# Patient Record
Sex: FEMALE | Race: WHITE | Employment: OTHER | URBAN - METROPOLITAN AREA
[De-identification: names, ages, dates, MRNs, and addresses within clinical notes are randomized per-mention and may not be internally consistent; named-entity substitution may affect disease eponyms.]

---

## 2017-02-20 RX ORDER — FLUOXETINE 10 MG/1
10 CAPSULE ORAL DAILY
Qty: 30 CAP | Refills: 1 | Status: SHIPPED | OUTPATIENT
Start: 2017-02-20 | End: 2017-05-15 | Stop reason: SDUPTHER

## 2017-03-01 ENCOUNTER — OFFICE VISIT (OUTPATIENT)
Dept: INTERNAL MEDICINE CLINIC | Age: 62
End: 2017-03-01

## 2017-03-01 VITALS
WEIGHT: 142.2 LBS | RESPIRATION RATE: 16 BRPM | DIASTOLIC BLOOD PRESSURE: 53 MMHG | OXYGEN SATURATION: 97 % | SYSTOLIC BLOOD PRESSURE: 105 MMHG | HEART RATE: 64 BPM | HEIGHT: 63 IN | BODY MASS INDEX: 25.2 KG/M2 | TEMPERATURE: 98.7 F

## 2017-03-01 DIAGNOSIS — E78.5 HYPERLIPIDEMIA LDL GOAL <130: ICD-10-CM

## 2017-03-01 DIAGNOSIS — K51.918 ULCERATIVE COLITIS WITH OTHER COMPLICATION: Chronic | ICD-10-CM

## 2017-03-01 DIAGNOSIS — F34.1 DYSTHYMIA: ICD-10-CM

## 2017-03-01 DIAGNOSIS — E03.4 HYPOTHYROIDISM DUE TO ACQUIRED ATROPHY OF THYROID: ICD-10-CM

## 2017-03-01 DIAGNOSIS — Z00.00 ROUTINE PHYSICAL EXAMINATION: Primary | ICD-10-CM

## 2017-03-01 NOTE — PROGRESS NOTES
HISTORY OF PRESENT ILLNESS  Emilia Gutierrez is a 59 y.o. female. HPI  SUBJECTIVE: Emilia Gutierrez is a 59 y.o. female here for follow up of hypothyroidism. Lab Results   Component Value Date/Time    TSH 0.511 10/26/2016 10:30 AM     Thyroid ROS: denies fatigue, weight changes, heat/cold intolerance, bowel/skin changes or CVS symptoms. Subjective:   Emilia Gutierrez is a 59 y.o. female with hyperlipidemia. Cardiovascular risk analysis - 59 y.o. female LDL goal is under 130. ROS: taking medications as instructed, no medication side effects noted, no TIA's, no chest pain on exertion, no dyspnea on exertion, no swelling of ankles. Tolerating meds, no myalgias or other side effects noted  New concerns: stable. Dysthymia- cont with prozac stable     UC- stable no current issues with her bowels    Review of Systems   Constitutional: Negative for chills, diaphoresis, fever, malaise/fatigue and weight loss. HENT: Negative for congestion, ear pain, hearing loss, sore throat and tinnitus. Eyes: Negative for blurred vision and double vision. Respiratory: Negative for cough, hemoptysis, sputum production, shortness of breath and wheezing. Cardiovascular: Negative for chest pain, palpitations, orthopnea, claudication, leg swelling and PND. Gastrointestinal: Negative for abdominal pain, blood in stool, constipation, diarrhea, heartburn, nausea and vomiting. Genitourinary: Negative for dysuria, flank pain, frequency, hematuria and urgency. Musculoskeletal: Negative for back pain, joint pain, myalgias and neck pain. Skin: Negative. Neurological: Negative for dizziness, tingling, sensory change, speech change, focal weakness, seizures, loss of consciousness, weakness and headaches. Endo/Heme/Allergies: Negative for environmental allergies and polydipsia. Does not bruise/bleed easily. Psychiatric/Behavioral: Negative for depression, memory loss, substance abuse and suicidal ideas.  The patient is not nervous/anxious and does not have insomnia. Physical Exam   Constitutional: She is oriented to person, place, and time. She appears well-developed and well-nourished. HENT:   Head: Normocephalic and atraumatic. Right Ear: External ear normal.   Left Ear: External ear normal.   Nose: Nose normal.   Mouth/Throat: Oropharynx is clear and moist.   Eyes: Conjunctivae and EOM are normal. Pupils are equal, round, and reactive to light. Neck: Normal range of motion. Neck supple. Carotid bruit is not present. No thyromegaly present. Cardiovascular: Normal rate, regular rhythm, S1 normal, S2 normal, normal heart sounds and intact distal pulses. Pulmonary/Chest: Effort normal and breath sounds normal.   Abdominal: Soft. Normal appearance and bowel sounds are normal. There is no hepatosplenomegaly. There is no tenderness. Musculoskeletal: Normal range of motion. Neurological: She is alert and oriented to person, place, and time. Skin: Skin is warm, dry and intact. Psychiatric: She has a normal mood and affect. Her behavior is normal. Judgment and thought content normal.   Nursing note and vitals reviewed.       ASSESSMENT and Jovany Malone was seen today for annual exam.    Diagnoses and all orders for this visit:    Ulcerative colitis with other complication ([facility] [address]  -     CBC W/O DIFF    Hypothyroidism due to acquired atrophy of thyroid  -     TSH 3RD GENERATION  -     T4, FREE    Hyperlipidemia LDL goal <004  -     METABOLIC PANEL, COMPREHENSIVE  -     LIPID PANEL    Dysthymia-cont current dose of medicine       lab results and schedule of future lab studies reviewed with patient  reviewed diet, exercise and weight control  cardiovascular risk and specific lipid/LDL goals reviewed  reviewed medications and side effects in detail

## 2017-03-02 LAB
ALBUMIN SERPL-MCNC: 4.7 G/DL (ref 3.6–4.8)
ALBUMIN/GLOB SERPL: 2.6 {RATIO} (ref 1.1–2.5)
ALP SERPL-CCNC: 73 IU/L (ref 39–117)
ALT SERPL-CCNC: 23 IU/L (ref 0–32)
AST SERPL-CCNC: 27 IU/L (ref 0–40)
BILIRUB SERPL-MCNC: 1 MG/DL (ref 0–1.2)
BUN SERPL-MCNC: 18 MG/DL (ref 8–27)
BUN/CREAT SERPL: 30 (ref 11–26)
CALCIUM SERPL-MCNC: 9.2 MG/DL (ref 8.7–10.3)
CHLORIDE SERPL-SCNC: 100 MMOL/L (ref 96–106)
CHOLEST SERPL-MCNC: 159 MG/DL (ref 100–199)
CO2 SERPL-SCNC: 28 MMOL/L (ref 18–29)
CREAT SERPL-MCNC: 0.6 MG/DL (ref 0.57–1)
ERYTHROCYTE [DISTWIDTH] IN BLOOD BY AUTOMATED COUNT: 13.9 % (ref 12.3–15.4)
GLOBULIN SER CALC-MCNC: 1.8 G/DL (ref 1.5–4.5)
GLUCOSE SERPL-MCNC: 72 MG/DL (ref 65–99)
HCT VFR BLD AUTO: 40.9 % (ref 34–46.6)
HDLC SERPL-MCNC: 73 MG/DL
HGB BLD-MCNC: 13.7 G/DL (ref 11.1–15.9)
INTERPRETATION, 910389: NORMAL
LDLC SERPL CALC-MCNC: 73 MG/DL (ref 0–99)
MCH RBC QN AUTO: 30.2 PG (ref 26.6–33)
MCHC RBC AUTO-ENTMCNC: 33.5 G/DL (ref 31.5–35.7)
MCV RBC AUTO: 90 FL (ref 79–97)
PLATELET # BLD AUTO: 214 X10E3/UL (ref 150–379)
POTASSIUM SERPL-SCNC: 4.3 MMOL/L (ref 3.5–5.2)
PROT SERPL-MCNC: 6.5 G/DL (ref 6–8.5)
RBC # BLD AUTO: 4.54 X10E6/UL (ref 3.77–5.28)
SODIUM SERPL-SCNC: 143 MMOL/L (ref 134–144)
T4 FREE SERPL-MCNC: 1.64 NG/DL (ref 0.82–1.77)
TRIGL SERPL-MCNC: 65 MG/DL (ref 0–149)
TSH SERPL DL<=0.005 MIU/L-ACNC: 0.51 UIU/ML (ref 0.45–4.5)
VLDLC SERPL CALC-MCNC: 13 MG/DL (ref 5–40)
WBC # BLD AUTO: 5.7 X10E3/UL (ref 3.4–10.8)

## 2017-05-15 RX ORDER — FLUOXETINE 10 MG/1
10 CAPSULE ORAL DAILY
Qty: 30 CAP | Refills: 3 | Status: SHIPPED | OUTPATIENT
Start: 2017-05-15 | End: 2017-08-30 | Stop reason: SDUPTHER

## 2017-05-17 RX ORDER — CALCITONIN SALMON 200 [IU]/.09ML
SPRAY, METERED NASAL
Qty: 11.1 ML | Refills: 2 | Status: SHIPPED | OUTPATIENT
Start: 2017-05-17 | End: 2018-03-15 | Stop reason: SDUPTHER

## 2017-06-13 ENCOUNTER — OFFICE VISIT (OUTPATIENT)
Dept: INTERNAL MEDICINE CLINIC | Age: 62
End: 2017-06-13

## 2017-06-13 VITALS
WEIGHT: 147 LBS | RESPIRATION RATE: 14 BRPM | DIASTOLIC BLOOD PRESSURE: 50 MMHG | HEART RATE: 66 BPM | OXYGEN SATURATION: 99 % | HEIGHT: 63 IN | SYSTOLIC BLOOD PRESSURE: 114 MMHG | TEMPERATURE: 98.5 F | BODY MASS INDEX: 26.05 KG/M2

## 2017-06-13 DIAGNOSIS — N89.8 VAGINAL ITCHING: Primary | ICD-10-CM

## 2017-06-13 DIAGNOSIS — F34.1 DYSTHYMIA: ICD-10-CM

## 2017-06-13 DIAGNOSIS — E78.5 HYPERLIPIDEMIA LDL GOAL <130: ICD-10-CM

## 2017-06-13 DIAGNOSIS — E03.4 HYPOTHYROIDISM DUE TO ACQUIRED ATROPHY OF THYROID: ICD-10-CM

## 2017-06-13 RX ORDER — ESTRADIOL 0.1 MG/G
2 CREAM VAGINAL DAILY
Qty: 42.5 G | Refills: 1 | Status: SHIPPED | OUTPATIENT
Start: 2017-06-13 | End: 2017-09-06

## 2017-06-13 NOTE — PROGRESS NOTES
HISTORY OF PRESENT ILLNESS  Denise Guo is a 59 y.o. female. HPI  Patient presents today for vaginal itching. Patient reports that her vaginal itching started a week ago. She states that it burns and itches. She reports that she has a little discharge. She states that she has burning and tingling when sitting. Patient reports that she used a Hydrocortisone medication on the outside of vagina. She has a history of yeast infections and does not think it is a yeast infection. Patient states that her mood is doing well and stable. Hypothyroidism. Lab Results   Component Value Date/Time    TSH 0.511 03/01/2017 10:12 AM     Thyroid ROS: denies fatigue, weight changes, heat/cold intolerance, bowel/skin changes or CVS symptoms. Patient reports that she is doing well on her thyroid medication. Hyperlipidemia:  Cardiovascular risk analysis - 59 y.o. female LDL goal is under 130. ROS: taking medications as instructed, no medication side effects noted, no TIA's, no chest pain on exertion, no dyspnea on exertion, no swelling of ankles. Tolerating meds, no myalgias or other side effects noted  New concerns: LDL was 73 on 3/1/2017. Review of Systems   All other systems reviewed and are negative. Physical Exam   Constitutional: She is oriented to person, place, and time. She appears well-developed and well-nourished. HENT:   Head: Normocephalic and atraumatic. Right Ear: External ear normal.   Left Ear: External ear normal.   Nose: Nose normal.   Mouth/Throat: Oropharynx is clear and moist.   Eyes: Conjunctivae and EOM are normal.   Neck: Normal range of motion. Neck supple. Carotid bruit is not present. No thyroid mass and no thyromegaly present. Cardiovascular: Normal rate, regular rhythm, S1 normal, S2 normal, normal heart sounds and intact distal pulses. Pulmonary/Chest: Effort normal and breath sounds normal.   Abdominal: Soft.  Normal appearance and bowel sounds are normal. There is no hepatosplenomegaly. There is no tenderness. Genitourinary:   Genitourinary Comments: Atrophic vaginal wall. No discharge. Musculoskeletal: Normal range of motion. Neurological: She is alert and oriented to person, place, and time. She has normal strength. No cranial nerve deficit or sensory deficit. Coordination normal.   Skin: Skin is warm, dry and intact. No abrasion and no rash noted. Psychiatric: She has a normal mood and affect. Her behavior is normal. Judgment and thought content normal.   Nursing note and vitals reviewed. ASSESSMENT and PLAN  Shobha Smith was seen today for vaginal itching. Diagnoses and all orders for this visit:    Vaginal itching  Not yeast infection. I think this is due to vaginal dryness with the burning and dryness in vaginal folds will try estrogen cream and see if that helps    Hypothyroidism due to acquired atrophy of thyroid  Continue on current meds. Hyperlipidemia LDL goal <130  Follow up in Fall. LDL was 73 on 3/1/2017    Dysthymia  Mood is stable. Cont current dose of medicine    Other orders  -     estradiol (ESTRACE) 0.01 % (0.1 mg/gram) vaginal cream; Insert 2 g into vagina daily. Daily for two weeks then 2-3 times a week    lab results and schedule of future lab studies reviewed with patient  reviewed diet, exercise and weight control    Written by Alejandro Tubbs, as dictated by Uriel Viera MD.     Current diagnosis and concerns discussed with pt at length. Understands risks and benefits or current treatment plan and medications and accepts the treatment and medication with any possible risks. Pt asks appropriate questions which were answered. Pt instructed to call with any concerns or problems.

## 2017-08-05 RX ORDER — ESTRADIOL 10 UG/1
10 INSERT VAGINAL DAILY
Qty: 30 TAB | Refills: 3 | Status: SHIPPED | OUTPATIENT
Start: 2017-08-05 | End: 2018-06-05 | Stop reason: ALTCHOICE

## 2017-08-30 RX ORDER — FLUOXETINE 10 MG/1
10 CAPSULE ORAL DAILY
Qty: 30 CAP | Refills: 5 | Status: SHIPPED | OUTPATIENT
Start: 2017-08-30 | End: 2018-02-11 | Stop reason: SDUPTHER

## 2017-09-06 ENCOUNTER — OFFICE VISIT (OUTPATIENT)
Dept: INTERNAL MEDICINE CLINIC | Age: 62
End: 2017-09-06

## 2017-09-06 VITALS
HEIGHT: 63 IN | DIASTOLIC BLOOD PRESSURE: 59 MMHG | SYSTOLIC BLOOD PRESSURE: 140 MMHG | BODY MASS INDEX: 25.41 KG/M2 | OXYGEN SATURATION: 98 % | WEIGHT: 143.4 LBS | RESPIRATION RATE: 18 BRPM | HEART RATE: 68 BPM | TEMPERATURE: 98.5 F

## 2017-09-06 DIAGNOSIS — E78.5 HYPERLIPIDEMIA LDL GOAL <130: ICD-10-CM

## 2017-09-06 DIAGNOSIS — F34.1 DYSTHYMIA: ICD-10-CM

## 2017-09-06 DIAGNOSIS — E03.4 HYPOTHYROIDISM DUE TO ACQUIRED ATROPHY OF THYROID: Primary | ICD-10-CM

## 2017-09-06 DIAGNOSIS — K51.918 ULCERATIVE COLITIS WITH OTHER COMPLICATION: Chronic | ICD-10-CM

## 2017-09-06 RX ORDER — LEVOTHYROXINE SODIUM 75 UG/1
75 TABLET ORAL
Qty: 90 TAB | Refills: 3 | Status: SHIPPED | COMMUNITY
Start: 2017-09-06 | End: 2018-11-06 | Stop reason: SDUPTHER

## 2017-09-06 NOTE — PROGRESS NOTES
Chief Complaint   Patient presents with    Cholesterol Problem     6 month f/u w/ labs     1. Have you been to the ER, urgent care clinic since your last visit? Hospitalized since your last visit? No    2. Have you seen or consulted any other health care providers outside of the [address] [place] [address] since your last visit? Include any pap smears or colon screening.  No

## 2017-09-06 NOTE — PROGRESS NOTES
HISTORY OF PRESENT ILLNESS  Anjali Taylor is a 59 y.o. female. HPI   Presents today for follow up. Patient reports she is exercising regularly. She states mood is doing great on Prozac. Mood is stable and she has good balance despite all the pull she has with her mom and trying to manage her health  Patient reports she is managing ulcerative colitis. Hypothyroidism. Lab Results   Component Value Date/Time    TSH 0.511 03/01/2017 10:12 AM     Thyroid ROS: denies fatigue, weight changes, heat/cold intolerance, bowel/skin changes or CVS symptoms. Hyperlipidemia:  Cardiovascular risk analysis - 59 y.o. female LDL goal is under 130. ROS: taking medications as instructed, no medication side effects noted, no TIA's, no chest pain on exertion, no dyspnea on exertion, no swelling of ankles. Tolerating meds, no myalgias or other side effects noted  New concerns: Last LDL was 73 on 3/1/2017. Review of Systems   All other systems reviewed and are negative. Physical Exam   Constitutional: She is oriented to person, place, and time. She appears well-developed and well-nourished. HENT:   Head: Normocephalic and atraumatic. Right Ear: External ear normal.   Left Ear: External ear normal.   Nose: Nose normal.   Mouth/Throat: Oropharynx is clear and moist.   Eyes: Conjunctivae and EOM are normal.   Neck: Normal range of motion. Neck supple. Carotid bruit is not present. No thyroid mass and no thyromegaly present. Cardiovascular: Normal rate, regular rhythm, S1 normal, S2 normal, normal heart sounds and intact distal pulses. Pulmonary/Chest: Effort normal and breath sounds normal.   Abdominal: Soft. Normal appearance and bowel sounds are normal. There is no hepatosplenomegaly. There is no tenderness. Musculoskeletal: Normal range of motion. Neurological: She is alert and oriented to person, place, and time. She has normal strength. No cranial nerve deficit or sensory deficit.  Coordination normal. Skin: Skin is warm, dry and intact. No abrasion and no rash noted. Psychiatric: She has a normal mood and affect. Her behavior is normal. Judgment and thought content normal.   Nursing note and vitals reviewed. ASSESSMENT and PLAN  Diagnoses and all orders for this visit:    1. Hypothyroidism due to acquired atrophy of thyroid   Patient tolerating meds. I do not recommend a change in medications.  -     TSH 3RD GENERATION  -     T4, FREE  -     levothyroxine (SYNTHROID) 75 mcg tablet; Take 1 Tab by mouth Daily (before breakfast). 2. Hyperlipidemia LDL goal <130  Cholesterol stable. I do not recommend a change in medication.  -     LIPID PANEL  -     METABOLIC PANEL, COMPREHENSIVE    3. Ulcerative colitis with other complication ([facility] [address]  Stable. I do not recommend any change in medication. 4. Dysthymia  Stable. I do not recommend any change in medication. lab results and schedule of future lab studies reviewed with patient  reviewed diet, exercise and weight control    Written by Desmond Joseph, as dictated by Derrick Koo MD.     Current diagnosis and concerns discussed with pt at length. Understands risks and benefits or current treatment plan and medications and accepts the treatment and medication with any possible risks. Pt asks appropriate questions which were answered. Pt instructed to call with any concerns or problems.

## 2017-09-07 LAB
ALBUMIN SERPL-MCNC: 4.7 G/DL (ref 3.6–4.8)
ALBUMIN/GLOB SERPL: 2 {RATIO} (ref 1.2–2.2)
ALP SERPL-CCNC: 71 IU/L (ref 39–117)
ALT SERPL-CCNC: 29 IU/L (ref 0–32)
AST SERPL-CCNC: 35 IU/L (ref 0–40)
BILIRUB SERPL-MCNC: 1.2 MG/DL (ref 0–1.2)
BUN SERPL-MCNC: 14 MG/DL (ref 8–27)
BUN/CREAT SERPL: 22 (ref 12–28)
CALCIUM SERPL-MCNC: 9.5 MG/DL (ref 8.7–10.3)
CHLORIDE SERPL-SCNC: 99 MMOL/L (ref 96–106)
CHOLEST SERPL-MCNC: 163 MG/DL (ref 100–199)
CO2 SERPL-SCNC: 28 MMOL/L (ref 18–29)
CREAT SERPL-MCNC: 0.63 MG/DL (ref 0.57–1)
GLOBULIN SER CALC-MCNC: 2.3 G/DL (ref 1.5–4.5)
GLUCOSE SERPL-MCNC: 73 MG/DL (ref 65–99)
HDLC SERPL-MCNC: 77 MG/DL
INTERPRETATION, 910389: NORMAL
LDLC SERPL CALC-MCNC: 74 MG/DL (ref 0–99)
POTASSIUM SERPL-SCNC: 4.7 MMOL/L (ref 3.5–5.2)
PROT SERPL-MCNC: 7 G/DL (ref 6–8.5)
SODIUM SERPL-SCNC: 141 MMOL/L (ref 134–144)
T4 FREE SERPL-MCNC: 1.75 NG/DL (ref 0.82–1.77)
TRIGL SERPL-MCNC: 61 MG/DL (ref 0–149)
TSH SERPL DL<=0.005 MIU/L-ACNC: 0.58 UIU/ML (ref 0.45–4.5)
VLDLC SERPL CALC-MCNC: 12 MG/DL (ref 5–40)

## 2017-09-25 ENCOUNTER — HOSPITAL ENCOUNTER (OUTPATIENT)
Dept: MAMMOGRAPHY | Age: 62
Discharge: HOME | End: 2017-09-25
Attending: INTERNAL MEDICINE
Payer: COMMERCIAL

## 2017-09-25 DIAGNOSIS — Z12.31 VISIT FOR SCREENING MAMMOGRAM: ICD-10-CM

## 2017-09-25 PROCEDURE — 77067 SCR MAMMO BI INCL CAD: CPT

## 2017-12-03 RX ORDER — ROSUVASTATIN CALCIUM 10 MG/1
TABLET, COATED ORAL
Qty: 90 TAB | Refills: 1 | Status: SHIPPED | OUTPATIENT
Start: 2017-12-03 | End: 2018-05-16 | Stop reason: SDUPTHER

## 2018-01-26 ENCOUNTER — OFFICE VISIT (OUTPATIENT)
Dept: OBGYN CLINIC | Age: 63
End: 2018-01-26

## 2018-01-26 VITALS
DIASTOLIC BLOOD PRESSURE: 60 MMHG | BODY MASS INDEX: 25.52 KG/M2 | SYSTOLIC BLOOD PRESSURE: 114 MMHG | HEIGHT: 63 IN | WEIGHT: 144 LBS

## 2018-01-26 DIAGNOSIS — N90.4 LICHEN SCLEROSUS ET ATROPHICUS OF THE VULVA: Primary | ICD-10-CM

## 2018-01-26 DIAGNOSIS — N76.0 VAGINITIS AND VULVOVAGINITIS: ICD-10-CM

## 2018-01-26 RX ORDER — BETAMETHASONE VALERATE 1.2 MG/G
CREAM TOPICAL 2 TIMES DAILY
Qty: 45 G | Refills: 3 | Status: SHIPPED | OUTPATIENT
Start: 2018-01-26 | End: 2019-05-22 | Stop reason: SDUPTHER

## 2018-01-26 NOTE — PROGRESS NOTES
Vaginitis evaluation    Chief Complaint   Vaginitis      HPI  57 y.o. female complains of vulvar itching and irritation with some vaginal discharge for the past few months  Patient's last menstrual period was 10/01/2009. She denies additional symptoms at this time. Saw PCP her referred her to us. She has not been tested for yeast or bacteria. Was tried on estrogen cream, which gave her tender breasts and vaginal burning. Vagifem next, which just gave her discharge and didn't help. The patient denies aggravating factors. She is not concerned about possible STI exposure at this time. She denies exposure to new chemicals ot hygenic agents  Previous treatment included: Vagifem and another cream.     Past Medical History:   Diagnosis Date    Hypercholesteremia     [facility]) [facility]     Parkinson disease ([facility]     Seasonal allergies     Skin benign neoplasm 2005    lipoma removed from upper right thigh    Thyroid disease     Ulcerative colitis ([facility] [facility] 1973     Past Surgical History:   Procedure Laterality Date    ENDOSCOPY, COLON, DIAGNOSTIC      1/13  normal- h/o UC     HX APPENDECTOMY      HX HEENT  02/2014    periodontal graft    HX OTHER SURGICAL Right 2005    lipoma    HX PACEMAKER PLACEMENT Left      Social History     Occupational History    Not on file.      Social History Main Topics    Smoking status: Former Smoker     Quit date: 12/12/1980    Smokeless tobacco: Never Used      Comment: in her twenties   Nydia Jones Alcohol use 0.5 oz/week     1 Glasses of wine per week      Comment: social, maybe once a week    Drug use: No    Sexual activity: Not Currently     Partners: Male     Birth control/ protection: None     Family History   Problem Relation Age of Onset    Elevated Lipids Mother     Lung Disease Father      pulmonary fibrosis    Elevated Lipids Maternal Aunt     Elevated Lipids Maternal Uncle     Diabetes Maternal Grandmother     Heart Disease Maternal Grandfather Allergies   Allergen Reactions    Keflex [Cephalexin] Other (comments)     Severe pain all over    Penicillins Hives    Sulfa (Sulfonamide Antibiotics) Rash     Prior to Admission medications    Medication Sig Start Date End Date Taking? Authorizing Provider   rosuvastatin (CRESTOR) 10 mg tablet TAKE 1 TABLET DAILY 12/3/17  Yes Kayleigh Burnham MD   levothyroxine (SYNTHROID) 75 mcg tablet Take 1 Tab by mouth Daily (before breakfast). 9/6/17  Yes Kayleigh Burnham MD   mesalamine ER (APRISO) 0.375 gram capsule Take 1.5 g by mouth daily. Yes Historical Provider   FLUoxetine (PROZAC) 10 mg capsule Take 1 Cap by mouth daily for 30 days. 8/30/17 9/29/17  Kayleigh Burnham MD   estradiol (VAGIFEM) 10 mcg tab vaginal tablet Insert 1 Tab into vagina daily. Daily for two weeks then 2-3 times a week 8/5/17   Kayleigh Burnham MD   calcitonin, salmon, (MIACALCIN) nasal USE ONE SPRAY INTRANASALLY DAILY 5/17/17   Kayleigh Burnham MD   ERGOCALCIFEROL (VITAMIN D PO) Take 1,000 Int'l Units by mouth daily.  7/1/10   Historical Provider                      Review of Systems - History obtained from the patient  Constitutional: negative for weight loss, fever, night sweats  Breast: negative for breast lumps, nipple discharge, galactorrhea  GI: negative for change in bowel habits, abdominal pain, black or bloody stools  : negative for frequency, dysuria, hematuria  MSK: negative for back pain, joint pain, muscle pain  Skin: negative for itching, rash, hives  Neuro: negative for dizziness, headache, confusion, weakness  Psych: negative for anxiety, depression, change in mood  Heme/lymph: negative for bleeding, bruising, pallor       Objective:    Visit Vitals    /60    Ht 5' 3\" (1.6 m)    Wt 144 lb (65.3 kg)    LMP 10/01/2009    BMI 25.51 kg/m2       Physical Exam:   PHYSICAL EXAMINATION    Constitutional  · Appearance: well-nourished, well developed, alert, in no acute distress    HENT  · Head and Face: appears normal    Genitourinary  · External Genitalia: atrophy and LSA changes with white area anterior and resorption of labia minora--especially posterior, no discharge present, no tenderness present, no inflammatory lesions present, no masses present  · Vagina:  normal discharge present, otherwise atrophic vaginal vault without central or paravaginal defects, no inflammatory lesions present, no masses present  · Bladder: non-tender to palpation  · Urethra: appears normal  · Cervix: normal   · Uterus: normal size, shape and consistency  · Adnexa: no adnexal tenderness present, no adnexal masses present  · Perineum: perineum within normal limits, no evidence of trauma, no rashes or skin lesions present  · Anus: anus within normal limits, no hemorrhoids present  · Inguinal Lymph Nodes: no lymphadenopathy present    Skin  · General Inspection: no rash, no lesions identified    Neurologic/Psychiatric  · Mental Status:  · Orientation: grossly oriented to person, place and time  · Mood and Affect: mood normal, affect appropriate      No results found for any visits on 01/26/18. Assessment:   LSA is primary cause of symptoms  Doubt monilia vaginitis    Plan:   Treatment: Valisone for LSA. NS sent--call pt if positive for monilia rx prn.    ROV prn if symptoms persist or worsen.

## 2018-01-31 LAB
A VAGINAE DNA VAG QL NAA+PROBE: NORMAL SCORE
BVAB2 DNA VAG QL NAA+PROBE: NORMAL SCORE
C ALBICANS DNA VAG QL NAA+PROBE: NEGATIVE
C GLABRATA DNA VAG QL NAA+PROBE: NEGATIVE
MEGA1 DNA VAG QL NAA+PROBE: NORMAL SCORE

## 2018-02-11 RX ORDER — FLUOXETINE 10 MG/1
CAPSULE ORAL
Qty: 30 CAP | Refills: 5 | Status: SHIPPED | OUTPATIENT
Start: 2018-02-11 | End: 2018-07-29 | Stop reason: SDUPTHER

## 2018-03-15 RX ORDER — CALCITONIN SALMON 200 [IU]/.09ML
SPRAY, METERED NASAL
Qty: 11.1 ML | Refills: 2 | Status: SHIPPED | OUTPATIENT
Start: 2018-03-15 | End: 2018-12-10 | Stop reason: SDUPTHER

## 2018-04-05 ENCOUNTER — HOSPITAL ENCOUNTER (OUTPATIENT)
Dept: LAB | Age: 63
Discharge: HOME | End: 2018-04-05
Payer: COMMERCIAL

## 2018-04-05 ENCOUNTER — OFFICE VISIT (OUTPATIENT)
Dept: INTERNAL MEDICINE CLINIC | Age: 63
End: 2018-04-05

## 2018-04-05 VITALS
TEMPERATURE: 98.6 F | WEIGHT: 147.4 LBS | HEIGHT: 63 IN | BODY MASS INDEX: 26.12 KG/M2 | HEART RATE: 67 BPM | RESPIRATION RATE: 14 BRPM | SYSTOLIC BLOOD PRESSURE: 120 MMHG | DIASTOLIC BLOOD PRESSURE: 56 MMHG | OXYGEN SATURATION: 97 %

## 2018-04-05 DIAGNOSIS — E78.5 HYPERLIPIDEMIA LDL GOAL <130: ICD-10-CM

## 2018-04-05 DIAGNOSIS — F32.A MILD DEPRESSION: ICD-10-CM

## 2018-04-05 DIAGNOSIS — Z11.59 ENCOUNTER FOR HEPATITIS C SCREENING TEST FOR LOW RISK PATIENT: ICD-10-CM

## 2018-04-05 DIAGNOSIS — Z12.4 ROUTINE PAPANICOLAOU SMEAR: Primary | ICD-10-CM

## 2018-04-05 DIAGNOSIS — E03.4 HYPOTHYROIDISM DUE TO ACQUIRED ATROPHY OF THYROID: ICD-10-CM

## 2018-04-05 PROCEDURE — 87624 HPV HI-RISK TYP POOLED RSLT: CPT | Performed by: INTERNAL MEDICINE

## 2018-04-05 PROCEDURE — 88175 CYTOPATH C/V AUTO FLUID REDO: CPT | Performed by: INTERNAL MEDICINE

## 2018-04-05 NOTE — PROGRESS NOTES
HISTORY OF PRESENT ILLNESS  Sean Bright is a 57 y.o. female. HPI  SUBJECTIVE: Sean Bright is a 57 y.o. female here for follow up of hypothyroidism. Lab Results   Component Value Date/Time    TSH 0.577 09/06/2017 11:16 AM     Thyroid ROS: denies fatigue, weight changes, heat/cold intolerance, bowel/skin changes or CVS symptoms. Subjective:   Sean Bright is a 57 y.o. female with hyperlipidemia. Cardiovascular risk analysis - 57 y.o. female LDL goal is under 130. ROS: taking medications as instructed, no medication side effects noted, no TIA's, no chest pain on exertion, no dyspnea on exertion, no swelling of ankles. Tolerating meds, no myalgias or other side effects noted  New concerns: stable on medicine    Dysthymia- cont with prozac and sleeping well- she was walking when in [place] as was at spring [employer] games for [employer]  . Review of Systems   Constitutional: Negative for chills, diaphoresis, fever, malaise/fatigue and weight loss. HENT: Negative for congestion, ear pain, hearing loss, sore throat and tinnitus. Eyes: Negative for blurred vision and double vision. Respiratory: Negative for cough, hemoptysis, sputum production, shortness of breath and wheezing. Cardiovascular: Negative for chest pain, palpitations, orthopnea, claudication, leg swelling and PND. Gastrointestinal: Negative for abdominal pain, blood in stool, constipation, diarrhea, heartburn, nausea and vomiting. Genitourinary: Negative for dysuria, flank pain, frequency, hematuria and urgency. Musculoskeletal: Negative for back pain, joint pain, myalgias and neck pain. Skin: Negative. Neurological: Negative for dizziness, tingling, sensory change, speech change, focal weakness, seizures, loss of consciousness, weakness and headaches. Endo/Heme/Allergies: Negative for environmental allergies and polydipsia. Does not bruise/bleed easily.    Psychiatric/Behavioral: Negative for depression, memory loss, substance abuse and suicidal ideas. The patient is not nervous/anxious and does not have insomnia. Physical Exam   Constitutional: She is oriented to person, place, and time. She appears well-developed and well-nourished. HENT:   Head: Normocephalic and atraumatic. Right Ear: External ear normal.   Left Ear: External ear normal.   Nose: Nose normal.   Mouth/Throat: Oropharynx is clear and moist.   Eyes: Conjunctivae and EOM are normal. Pupils are equal, round, and reactive to light. Neck: Normal range of motion. Neck supple. Carotid bruit is not present. No thyromegaly present. Cardiovascular: Normal rate, regular rhythm, S1 normal, S2 normal, normal heart sounds and intact distal pulses. Pulmonary/Chest: Effort normal and breath sounds normal.   Abdominal: Soft. Normal appearance and bowel sounds are normal. There is no hepatosplenomegaly. There is no tenderness. Musculoskeletal: Normal range of motion. Neurological: She is alert and oriented to person, place, and time. Skin: Skin is warm, dry and intact. Psychiatric: She has a normal mood and affect. Her behavior is normal. Judgment and thought content normal.   Nursing note and vitals reviewed. ASSESSMENT and PLAN  Diagnoses and all orders for this visit:    1. Routine Papanicolaou smear    2. Hypothyroidism due to acquired atrophy of thyroid-cont current dose of medicine   -     CBC W/O DIFF  -     T4, FREE  -     TSH 3RD GENERATION    3. Hyperlipidemia LDL goal <130- tolerating medicine no muscle aches  -     METABOLIC PANEL, COMPREHENSIVE  -     LIPID PANEL    4. Mild depression (HCC)- cont current dose of medicine and doing well     5.  Encounter for hepatitis C screening test for low risk patient  -     HEPATITIS C AB      lab results and schedule of future lab studies reviewed with patient  reviewed diet, exercise and weight control  cardiovascular risk and specific lipid/LDL goals reviewed  reviewed medications and side effects in detail

## 2018-04-06 LAB
ALBUMIN SERPL-MCNC: 4.2 G/DL (ref 3.6–4.8)
ALBUMIN/GLOB SERPL: 2.1 {RATIO} (ref 1.2–2.2)
ALP SERPL-CCNC: 64 IU/L (ref 39–117)
ALT SERPL-CCNC: 27 IU/L (ref 0–32)
AST SERPL-CCNC: 33 IU/L (ref 0–40)
BILIRUB SERPL-MCNC: 0.8 MG/DL (ref 0–1.2)
BUN SERPL-MCNC: 16 MG/DL (ref 8–27)
BUN/CREAT SERPL: 26 (ref 12–28)
CALCIUM SERPL-MCNC: 9.2 MG/DL (ref 8.7–10.3)
CHLORIDE SERPL-SCNC: 101 MMOL/L (ref 96–106)
CHOLEST SERPL-MCNC: 178 MG/DL (ref 100–199)
CO2 SERPL-SCNC: 26 MMOL/L (ref 18–29)
CREAT SERPL-MCNC: 0.62 MG/DL (ref 0.57–1)
ERYTHROCYTE [DISTWIDTH] IN BLOOD BY AUTOMATED COUNT: 14.4 % (ref 12.3–15.4)
GFR SERPLBLD CREATININE-BSD FMLA CKD-EPI: 110 ML/MIN/1.73
GFR SERPLBLD CREATININE-BSD FMLA CKD-EPI: 96 ML/MIN/1.73
GLOBULIN SER CALC-MCNC: 2 G/DL (ref 1.5–4.5)
GLUCOSE SERPL-MCNC: 70 MG/DL (ref 65–99)
HCT VFR BLD AUTO: 38.7 % (ref 34–46.6)
HCV AB S/CO SERPL IA: <0.1 S/CO RATIO (ref 0–0.9)
HDLC SERPL-MCNC: 85 MG/DL
HGB BLD-MCNC: 12.8 G/DL (ref 11.1–15.9)
INTERPRETATION, 910389: NORMAL
LDLC SERPL CALC-MCNC: 82 MG/DL (ref 0–99)
MCH RBC QN AUTO: 30.8 PG (ref 26.6–33)
MCHC RBC AUTO-ENTMCNC: 33.1 G/DL (ref 31.5–35.7)
MCV RBC AUTO: 93 FL (ref 79–97)
PLATELET # BLD AUTO: 228 X10E3/UL (ref 150–379)
POTASSIUM SERPL-SCNC: 4.1 MMOL/L (ref 3.5–5.2)
PROT SERPL-MCNC: 6.2 G/DL (ref 6–8.5)
RBC # BLD AUTO: 4.15 X10E6/UL (ref 3.77–5.28)
SODIUM SERPL-SCNC: 142 MMOL/L (ref 134–144)
T4 FREE SERPL-MCNC: 1.6 NG/DL (ref 0.82–1.77)
TRIGL SERPL-MCNC: 53 MG/DL (ref 0–149)
TSH SERPL DL<=0.005 MIU/L-ACNC: 0.7 UIU/ML (ref 0.45–4.5)
VLDLC SERPL CALC-MCNC: 11 MG/DL (ref 5–40)
WBC # BLD AUTO: 5.5 X10E3/UL (ref 3.4–10.8)

## 2018-05-16 RX ORDER — ROSUVASTATIN CALCIUM 10 MG/1
TABLET, COATED ORAL
Qty: 90 TAB | Refills: 1 | Status: SHIPPED | OUTPATIENT
Start: 2018-05-16 | End: 2019-01-23 | Stop reason: SDUPTHER

## 2018-06-05 ENCOUNTER — OFFICE VISIT (OUTPATIENT)
Dept: INTERNAL MEDICINE CLINIC | Age: 63
End: 2018-06-05

## 2018-06-05 VITALS
DIASTOLIC BLOOD PRESSURE: 80 MMHG | BODY MASS INDEX: 25.8 KG/M2 | RESPIRATION RATE: 12 BRPM | OXYGEN SATURATION: 97 % | WEIGHT: 145.6 LBS | SYSTOLIC BLOOD PRESSURE: 110 MMHG | HEIGHT: 63 IN | HEART RATE: 71 BPM | TEMPERATURE: 98.7 F

## 2018-06-05 DIAGNOSIS — J01.41 ACUTE RECURRENT PANSINUSITIS: Primary | ICD-10-CM

## 2018-06-05 DIAGNOSIS — R05.9 COUGH: ICD-10-CM

## 2018-06-05 RX ORDER — AMOXICILLIN AND CLAVULANATE POTASSIUM 875; 125 MG/1; MG/1
1 TABLET, FILM COATED ORAL 2 TIMES DAILY
Qty: 20 TAB | Refills: 0 | Status: SHIPPED | OUTPATIENT
Start: 2018-06-05 | End: 2018-10-11 | Stop reason: ALTCHOICE

## 2018-06-05 RX ORDER — CODEINE PHOSPHATE AND GUAIFENESIN 10; 100 MG/5ML; MG/5ML
5 SOLUTION ORAL
Qty: 118 ML | Refills: 0 | Status: SHIPPED | OUTPATIENT
Start: 2018-06-05 | End: 2018-10-11 | Stop reason: ALTCHOICE

## 2018-06-05 NOTE — PATIENT INSTRUCTIONS
Cough: Care Instructions  Your Care Instructions    A cough is your body's response to something that bothers your throat or airways. Many things can cause a cough. You might cough because of a cold or the flu, bronchitis, or asthma. Smoking, postnasal drip, allergies, and stomach acid that backs up into your throat also can cause coughs. A cough is a symptom, not a disease. Most coughs stop when the cause, such as a cold, goes away. You can take a few steps at home to cough less and feel better. Follow-up care is a key part of your treatment and safety. Be sure to make and go to all appointments, and call your doctor if you are having problems. It's also a good idea to know your test results and keep a list of the medicines you take. How can you care for yourself at home? · Drink lots of water and other fluids. This helps thin the mucus and soothes a dry or sore throat. Honey or lemon juice in hot water or tea may ease a dry cough. · Take cough medicine as directed by your doctor. · Prop up your head on pillows to help you breathe and ease a dry cough. · Try cough drops to soothe a dry or sore throat. Cough drops don't stop a cough. Medicine-flavored cough drops are no better than candy-flavored drops or hard candy. · Do not smoke. Avoid secondhand smoke. If you need help quitting, talk to your doctor about stop-smoking programs and medicines. These can increase your chances of quitting for good. When should you call for help? Call 911 anytime you think you may need emergency care. For example, call if:  ? · You have severe trouble breathing. ?Call your doctor now or seek immediate medical care if:  ? · You cough up blood. ? · You have new or worse trouble breathing. ? · You have a new or higher fever. ? · You have a new rash. ? Watch closely for changes in your health, and be sure to contact your doctor if:  ? · You cough more deeply or more often, especially if you notice more mucus or a change in the color of your mucus. ? · You have new symptoms, such as a sore throat, an earache, or sinus pain. ? · You do not get better as expected. Where can you learn more? Go to http://bro-kiana.info/. Enter D279 in the search box to learn more about \"Cough: Care Instructions. \"  Current as of: May 12, 2017  Content Version: 11.4  © 6089-0613 [employer]. Care instructions adapted under license by [employer] (which disclaims liability or warranty for this information). If you have questions about a medical condition or this instruction, always ask your healthcare professional. [address] any warranty or liability for your use of this information. Sinusitis: Care Instructions  Your Care Instructions    Sinusitis is an infection of the lining of the sinus cavities in your head. Sinusitis often follows a cold. It causes pain and pressure in your head and face. In most cases, sinusitis gets better on its own in 1 to 2 weeks. But some mild symptoms may last for several weeks. Sometimes antibiotics are needed. Follow-up care is a key part of your treatment and safety. Be sure to make and go to all appointments, and call your doctor if you are having problems. It's also a good idea to know your test results and keep a list of the medicines you take. How can you care for yourself at home? · Take an over-the-counter pain medicine, such as acetaminophen (Tylenol), ibuprofen (Advil, Motrin), or naproxen (Aleve). Read and follow all instructions on the label. · If the doctor prescribed antibiotics, take them as directed. Do not stop taking them just because you feel better. You need to take the full course of antibiotics. · Be careful when taking over-the-counter cold or flu medicines and Tylenol at the same time. Many of these medicines have acetaminophen, which is Tylenol.  Read the labels to make sure that you are not taking more than the recommended dose. Too much acetaminophen (Tylenol) can be harmful. · Breathe warm, moist air from a steamy shower, a hot bath, or a sink filled with hot water. Avoid cold, dry air. Using a humidifier in your home may help. Follow the directions for cleaning the machine. · Use saline (saltwater) nasal washes to help keep your nasal passages open and wash out mucus and bacteria. You can buy saline nose drops at a grocery store or drugstore. Or you can make your own at home by adding 1 teaspoon of salt and 1 teaspoon of baking soda to 2 cups of distilled water. If you make your own, fill a bulb syringe with the solution, insert the tip into your nostril, and squeeze gently. Ambar Polebridge your nose. · Put a hot, wet towel or a warm gel pack on your face 3 or 4 times a day for 5 to 10 minutes each time. · Try a decongestant nasal spray like oxymetazoline (Afrin). Do not use it for more than 3 days in a row. Using it for more than 3 days can make your congestion worse. When should you call for help? Call your doctor now or seek immediate medical care if:  ? · You have new or worse swelling or redness in your face or around your eyes. ? · You have a new or higher fever. ? Watch closely for changes in your health, and be sure to contact your doctor if:  ? · You have new or worse facial pain. ? · The mucus from your nose becomes thicker (like pus) or has new blood in it. ? · You are not getting better as expected. Where can you learn more? Go to http://bro-kiana.info/. Enter C242 in the search box to learn more about \"Sinusitis: Care Instructions. \"  Current as of: May 12, 2017  Content Version: 11.4  © 3974-7035 [employer]. Care instructions adapted under license by [employer] (which disclaims liability or warranty for this information).  If you have questions about a medical condition or this instruction, always ask your healthcare professional. Laura [employer]gs[employer] disclaims any warranty or liability for your use of this information.

## 2018-06-05 NOTE — MR AVS SNAPSHOT
[facility] 
 
 
 [address] [facility] Enter [address] [place] [address] 
[phone] Patient: Reema Mike MRN:  [id number] Visit Information Date & Time Provider Department Dept. Phone Encounter #  
 2018 11:00 AM Adama Venegas NP [facility] [id number] Your Appointments 10/11/2018 10:00 AM  
ROUTINE CARE with Radha Edwards MD  
[facility] [address]) Appt Note: 6 mo  
 [address] [id number]  
[phone]  
  
   
 [address] [facility] [address] Upcoming Health Maintenance Date Due Influenza Age 5 to Adult 2018 BREAST CANCER SCRN MAMMOGRAM 2019 PAP AKA CERVICAL CYTOLOGY 2021 DTaP/Tdap/Td series (2 - Td) 2025 COLONOSCOPY 3/15/2026 Allergies as of 2018  Review Complete On: 2018 By: Adama Venegas NP Severity Noted Reaction Type Reactions Sulfa (Sulfonamide Antibiotics)  2016    Rash Current Immunizations  Never Reviewed Name Date Influenza Vaccine 10/17/2017, 10/16/2014 Influenza Vaccine Split 10/12/2010, 10/20/2009 Tdap 2015 Zoster Vaccine, Live 2014 Not reviewed this visit You Were Diagnosed With   
  
 Codes Comments Acute recurrent pansinusitis    -  Primary ICD-10-CM: J01.41 
ICD-9-CM: 461.8 Cough     ICD-10-CM: R05 ICD-9-CM: 098. 2 Vitals BP Pulse Temp Resp Height(growth percentile) Weight(growth percentile) 110/80 (BP 1 Location: Left arm, BP Patient Position: Sitting) 71 98.7 °F (37.1 °C) (Oral) 12 5' 3\" (1.6 m) 145 lb 9.6 oz (66 kg) LMP SpO2 BMI OB Status Smoking Status 10/01/2009 97% 25.79 kg/m2 Postmenopausal Former Smoker BMI and BSA Data Body Mass Index Body Surface Area 25.79 kg/m 2 1.71 m 2 Preferred Pharmacy Pharmacy Name Phone [id number] [address] ([address]) [phone] Your Updated Medication List  
  
   
This list is accurate as of 6/5/18 11:26 AM.  Always use your most recent med list.  
  
  
  
  
 amoxicillin-clavulanate 875-125 mg per tablet Commonly known as:  AUGMENTIN Take 1 Tab by mouth two (2) times a day. APRISO 0.375 gram 24 hour capsule Generic drug:  mesalamine ER Take 1.5 g by mouth daily. betamethasone valerate 0.1 % topical cream  
Commonly known as:  Shabnam Swenson Apply  to affected area two (2) times a day. calcitonin (salmon) nasal  
Commonly known as:  MIACALCIN  
USE ONE SPRAY INTRANASALLY DAILY FLUoxetine 10 mg capsule Commonly known as:  PROzac TAKE 1 CAP BY MOUTH DAILY FOR 30 DAYS. guaiFENesin-codeine 100-10 mg/5 mL solution Commonly known as:  ROBITUSSIN AC Take 5 mL by mouth three (3) times daily as needed for Cough. Max Daily Amount: 15 mL.  
  
 guaiFENesin-dextromethorphan -30 mg per tablet Commonly known as:  Yonny & Yonny DM Take 1 Tab by mouth every twelve (12) hours as needed for Cough. levothyroxine 75 mcg tablet Commonly known as:  SYNTHROID Take 1 Tab by mouth Daily (before breakfast). rosuvastatin 10 mg tablet Commonly known as:  CRESTOR  
TAKE 1 TABLET DAILY  
  
 VITAMIN D2 PO Take 1,000 Int'l Units by mouth daily. Prescriptions Printed Refills  
 guaiFENesin-codeine (ROBITUSSIN AC) 100-10 mg/5 mL solution 0 Sig: Take 5 mL by mouth three (3) times daily as needed for Cough. Max Daily Amount: 15 mL. Class: Print Route: Oral  
  
Prescriptions Sent to Pharmacy Refills  
 amoxicillin-clavulanate (AUGMENTIN) 875-125 mg per tablet 0 Sig: Take 1 Tab by mouth two (2) times a day.   
 Class: Normal  
 Pharmacy: [facility]/pharmacy #[id number]- [address], [place] AT [address] #: [phone] Route: Oral  
  
Patient Instructions Cough: Care Instructions Your Care Instructions A cough is your body's response to something that bothers your throat or airways. Many things can cause a cough. You might cough because of a cold or the flu, bronchitis, or asthma. Smoking, postnasal drip, allergies, and stomach acid that backs up into your throat also can cause coughs. A cough is a symptom, not a disease. Most coughs stop when the cause, such as a cold, goes away. You can take a few steps at home to cough less and feel better. Follow-up care is a key part of your treatment and safety. Be sure to make and go to all appointments, and call your doctor if you are having problems. It's also a good idea to know your test results and keep a list of the medicines you take. How can you care for yourself at home? · Drink lots of water and other fluids. This helps thin the mucus and soothes a dry or sore throat. Honey or lemon juice in hot water or tea may ease a dry cough. · Take cough medicine as directed by your doctor. · Prop up your head on pillows to help you breathe and ease a dry cough. · Try cough drops to soothe a dry or sore throat. Cough drops don't stop a cough. Medicine-flavored cough drops are no better than candy-flavored drops or hard candy. · Do not smoke. Avoid secondhand smoke. If you need help quitting, talk to your doctor about stop-smoking programs and medicines. These can increase your chances of quitting for good. When should you call for help? Call 911 anytime you think you may need emergency care. For example, call if: 
? · You have severe trouble breathing. ?Call your doctor now or seek immediate medical care if: 
? · You cough up blood. ? · You have new or worse trouble breathing. ? · You have a new or higher fever. ? · You have a new rash. ?Watch closely for changes in your health, and be sure to contact your doctor if: 
? · You cough more deeply or more often, especially if you notice more mucus or a change in the color of your mucus. ? · You have new symptoms, such as a sore throat, an earache, or sinus pain. ? · You do not get better as expected. Where can you learn more? Go to http://bro-kiana.info/. Enter D279 in the search box to learn more about \"Cough: Care Instructions. \" Current as of: May 12, 2017 Content Version: 11.4 © 6879-5386 [employer]. Care instructions adapted under license by [employer] (which disclaims liability or warranty for this information). If you have questions about a medical condition or this instruction, always ask your healthcare professional. [address] any warranty or liability for your use of this information. Sinusitis: Care Instructions Your Care Instructions Sinusitis is an infection of the lining of the sinus cavities in your head. Sinusitis often follows a cold. It causes pain and pressure in your head and face. In most cases, sinusitis gets better on its own in 1 to 2 weeks. But some mild symptoms may last for several weeks. Sometimes antibiotics are needed. Follow-up care is a key part of your treatment and safety. Be sure to make and go to all appointments, and call your doctor if you are having problems. It's also a good idea to know your test results and keep a list of the medicines you take. How can you care for yourself at home? · Take an over-the-counter pain medicine, such as acetaminophen (Tylenol), ibuprofen (Advil, Motrin), or naproxen (Aleve). Read and follow all instructions on the label. · If the doctor prescribed antibiotics, take them as directed. Do not stop taking them just because you feel better. You need to take the full course of antibiotics. · Be careful when taking over-the-counter cold or flu medicines and Tylenol at the same time. Many of these medicines have acetaminophen, which is Tylenol. Read the labels to make sure that you are not taking more than the recommended dose. Too much acetaminophen (Tylenol) can be harmful. · Breathe warm, moist air from a steamy shower, a hot bath, or a sink filled with hot water. Avoid cold, dry air. Using a humidifier in your home may help. Follow the directions for cleaning the machine. · Use saline (saltwater) nasal washes to help keep your nasal passages open and wash out mucus and bacteria. You can buy saline nose drops at a grocery store or drugstore. Or you can make your own at home by adding 1 teaspoon of salt and 1 teaspoon of baking soda to 2 cups of distilled water. If you make your own, fill a bulb syringe with the solution, insert the tip into your nostril, and squeeze gently. Theresa Cramp your nose. · Put a hot, wet towel or a warm gel pack on your face 3 or 4 times a day for 5 to 10 minutes each time. · Try a decongestant nasal spray like oxymetazoline (Afrin). Do not use it for more than 3 days in a row. Using it for more than 3 days can make your congestion worse. When should you call for help? Call your doctor now or seek immediate medical care if: 
? · You have new or worse swelling or redness in your face or around your eyes. ? · You have a new or higher fever. ? Watch closely for changes in your health, and be sure to contact your doctor if: 
? · You have new or worse facial pain. ? · The mucus from your nose becomes thicker (like pus) or has new blood in it. ? · You are not getting better as expected. Where can you learn more? Go to http://bro-kiana.info/. Enter V149 in the search box to learn more about \"Sinusitis: Care Instructions. \" Current as of: May 12, 2017 Content Version: 11.4 © 2810-6347 [employer]wise, [employer].  Care instructions adapted under license by [address] (which disclaims liability or warranty for this information). If you have questions about a medical condition or this instruction, always ask your healthcare professional. [address] any warranty or liability for your use of this information. Introducing [facility]! Dear [facility] [place]: Thank you for requesting a Rootdown account. Our records indicate that you already have an active Rootdown account. You can access your account anytime at [email or web address] Did you know that you can access your hospital and ER discharge instructions at any time in Rootdown? You can also review all of your test results from your hospital stay or ER visit. Additional Information If you have questions, please visit the Frequently Asked Questions section of the Rootdown website at [email or web address] Remember, Rootdown is NOT to be used for urgent needs. For medical emergencies, dial 911. Now available from your iPhone and Android! Please provide this summary of care documentation to your next provider. Your primary care clinician is listed as Gerhardt Phenes. If you have any questions after today's visit, please call [phone].

## 2018-06-05 NOTE — PROGRESS NOTES
HISTORY OF PRESENT ILLNESS  Dennis Washington is a 57 y.o. female. HPI  Upper respiratory illness:  Dennis Washington presents with complaints of congestion, sore throat, post nasal drip, cough described as harsh, loose but minimally productive, headache, bilateral, lower sinus pain, hot and cold spells and yellow nasal discharge for 5 days. no nausea and no vomiting . she has not had  myalgias. Symptoms are moderate. Over-the-counter remedies including cough drops   has been used with poor relief of symptoms. Cough has been disturbing her sleep at night  Drinking plenty of fluids: yes  Asthma?:  no  non-smoker  Contacts with similar infections: yes         Review of Systems   Constitutional: Positive for chills and malaise/fatigue. Negative for fever. HENT: Positive for congestion, sinus pain and sore throat. Respiratory: Positive for cough and sputum production. Negative for shortness of breath and wheezing. Cardiovascular: Negative for chest pain and palpitations. Gastrointestinal: Negative for abdominal pain, constipation, diarrhea, nausea and vomiting. Genitourinary: Negative for dysuria and frequency. Musculoskeletal: Negative for myalgias. Neurological: Positive for headaches. Negative for dizziness. /80 (BP 1 Location: Left arm, BP Patient Position: Sitting)  Pulse 71  Temp 98.7 °F (37.1 °C) (Oral)   Resp 12  Ht 5' 3\" (1.6 m)  Wt 145 lb 9.6 oz (66 kg)  LMP 10/01/2009  SpO2 97%  BMI 25.79 kg/m2  Physical Exam   Constitutional: She is oriented to person, place, and time. She appears well-developed and well-nourished. HENT:   Head: Normocephalic and atraumatic. Right Ear: Tympanic membrane and external ear normal.   Left Ear: Tympanic membrane and external ear normal.   Nose: Mucosal edema present. Right sinus exhibits maxillary sinus tenderness and frontal sinus tenderness. Left sinus exhibits maxillary sinus tenderness.    Mouth/Throat: Posterior oropharyngeal erythema present. No posterior oropharyngeal edema. Neck: Normal range of motion. Neck supple. No thyromegaly present. Cardiovascular: Normal rate and regular rhythm. Pulmonary/Chest: Effort normal. She has no wheezes. She has no rales. Harsh cough   Lymphadenopathy:     She has no cervical adenopathy. Neurological: She is alert and oriented to person, place, and time. Skin: Skin is warm and dry. Psychiatric: She has a normal mood and affect. Her behavior is normal.   Nursing note and vitals reviewed. ASSESSMENT and PLAN  Diagnoses and all orders for this visit:    1. Acute recurrent pansinusitis  -     guaiFENesin-dextromethorphan SR (MUCINEX DM) 600-30 mg per tablet; Take 1 Tab by mouth every twelve (12) hours as needed for Cough. -     amoxicillin-clavulanate (AUGMENTIN) 875-125 mg per tablet; Take 1 Tab by mouth two (2) times a day. 2. Cough  -     guaiFENesin-codeine (ROBITUSSIN AC) 100-10 mg/5 mL solution; Take 5 mL by mouth three (3) times daily as needed for Cough. Max Daily Amount: 15 mL.       reviewed diet, exercise and weight control  reviewed medications and side effects in detail

## 2018-07-29 RX ORDER — FLUOXETINE 10 MG/1
CAPSULE ORAL
Qty: 30 CAP | Refills: 5 | Status: SHIPPED | OUTPATIENT
Start: 2018-07-29 | End: 2018-12-06 | Stop reason: SDUPTHER

## 2018-10-11 ENCOUNTER — OFFICE VISIT (OUTPATIENT)
Dept: INTERNAL MEDICINE CLINIC | Age: 63
End: 2018-10-11

## 2018-10-11 VITALS
WEIGHT: 146 LBS | TEMPERATURE: 98.8 F | SYSTOLIC BLOOD PRESSURE: 121 MMHG | BODY MASS INDEX: 25.87 KG/M2 | HEIGHT: 63 IN | RESPIRATION RATE: 18 BRPM | DIASTOLIC BLOOD PRESSURE: 65 MMHG | HEART RATE: 62 BPM | OXYGEN SATURATION: 96 %

## 2018-10-11 DIAGNOSIS — E03.4 HYPOTHYROIDISM DUE TO ACQUIRED ATROPHY OF THYROID: Primary | ICD-10-CM

## 2018-10-11 DIAGNOSIS — K51.818 OTHER ULCERATIVE COLITIS WITH OTHER COMPLICATION (HCC): Chronic | ICD-10-CM

## 2018-10-11 DIAGNOSIS — Z23 ENCOUNTER FOR IMMUNIZATION: ICD-10-CM

## 2018-10-11 DIAGNOSIS — E78.5 HYPERLIPIDEMIA LDL GOAL <130: ICD-10-CM

## 2018-10-11 DIAGNOSIS — M25.512 ACUTE PAIN OF LEFT SHOULDER: ICD-10-CM

## 2018-10-11 DIAGNOSIS — F32.A MILD DEPRESSION: ICD-10-CM

## 2018-10-11 NOTE — PROGRESS NOTES
HISTORY OF PRESENT ILLNESS Melissa Guillermo is a 57 y.o. female. HPI Left Shoulder Pain: Pt reports muscle pain near left shoulder for past several months. hypothyroidism. Lab Results Component Value Date/Time TSH 0.704 2018 11:27 AM  
 
Thyroid ROS: denies fatigue, weight changes, heat/cold intolerance, bowel/skin changes or CVS symptoms. Pt continues on Synthroid. Hyperlipidemia: 
Cardiovascular risk analysis - 57 y.o. female LDL goal is under 130. ROS: taking medications as instructed, no medication side effects noted, no TIA's, no chest pain on exertion, no dyspnea on exertion, no swelling of ankles. Tolerating meds, no myalgias or other side effects noted New concerns: Her last LDL was 82 on 18. Pt continues on Crestor. Mood: Stable, and well-managed with Prozac. Pt reports that aunt recently  and that she needs to sell aunt's estate. Ulcerative Colitis: Stable, and well-managed with Apriso. Review of Systems All other systems reviewed and are negative. Physical Exam  
Constitutional: She is oriented to person, place, and time. She appears well-developed and well-nourished. HENT:  
Head: Normocephalic and atraumatic. Right Ear: External ear normal.  
Left Ear: External ear normal.  
Nose: Nose normal.  
Mouth/Throat: Oropharynx is clear and moist.  
Eyes: Conjunctivae and EOM are normal.  
Neck: Normal range of motion. Neck supple. Carotid bruit is not present. No thyroid mass and no thyromegaly present. Cardiovascular: Normal rate, regular rhythm, S1 normal, S2 normal, normal heart sounds and intact distal pulses. Pulmonary/Chest: Effort normal and breath sounds normal.  
Abdominal: Soft. Normal appearance and bowel sounds are normal. There is no hepatosplenomegaly. There is no tenderness. Musculoskeletal: Normal range of motion. Neurological: She is alert and oriented to person, place, and time.  She has normal strength. No cranial nerve deficit or sensory deficit. Coordination normal.  
Skin: Skin is warm, dry and intact. No abrasion and no rash noted. Psychiatric: She has a normal mood and affect. Her behavior is normal. Judgment and thought content normal.  
Nursing note and vitals reviewed. ASSESSMENT and PLAN Diagnoses and all orders for this visit: 
 
1. Hypothyroidism due to acquired atrophy of thyroid Thyroid stable. I do not recommend a change in medications. 
-     TSH 3RD GENERATION 
-     T4, FREE 2. Hyperlipidemia LDL goal <130 Stable, patient tolerating meds, no myalgias. I do not recommend any change in medications. 
-     LIPID PANEL 
-     METABOLIC PANEL, COMPREHENSIVE 3. Other ulcerative colitis with other complication ([facility] [address] Stable, and well-managed. No change in medications. -     CBC WITH AUTOMATED DIFF 4. Mild depression ([facility] [address] Stable, and doing well. Continue current medications. 5. Encounter for immunization Administered flu shot today in office.  
-     Influenza virus vaccine (QUADRIVALENT PRES FREE SYRINGE) IM ([id number]) 6. Acute pain of left shoulder Stable condition. Pt will f/u for PT. Will monitor for updates. This note will not be viewable in [address] Lab results and schedule of future lab studies reviewed with patient. Reviewed diet, exercise and weight control. Written by Larry Polo, as dictated by Alie Boyle MD.  
 
Current diagnosis and concerns discussed with pt at length. Understands risks and benefits or current treatment plan and medications and accepts the treatment and medication with any possible risks. Pt asks appropriate questions which were answered. Pt instructed to call with any concerns or problems.

## 2018-10-12 LAB
ALBUMIN SERPL-MCNC: 4.4 G/DL (ref 3.6–4.8)
ALBUMIN/GLOB SERPL: 2 {RATIO} (ref 1.2–2.2)
ALP SERPL-CCNC: 72 IU/L (ref 39–117)
ALT SERPL-CCNC: 20 IU/L (ref 0–32)
AST SERPL-CCNC: 29 IU/L (ref 0–40)
BASOPHILS # BLD AUTO: 0 X10E3/UL (ref 0–0.2)
BASOPHILS NFR BLD AUTO: 0 %
BILIRUB SERPL-MCNC: 1.1 MG/DL (ref 0–1.2)
BUN SERPL-MCNC: 13 MG/DL (ref 8–27)
BUN/CREAT SERPL: 19 (ref 12–28)
CALCIUM SERPL-MCNC: 9.3 MG/DL (ref 8.7–10.3)
CHLORIDE SERPL-SCNC: 101 MMOL/L (ref 96–106)
CHOLEST SERPL-MCNC: 163 MG/DL (ref 100–199)
CO2 SERPL-SCNC: 24 MMOL/L (ref 20–29)
CREAT SERPL-MCNC: 0.67 MG/DL (ref 0.57–1)
EOSINOPHIL # BLD AUTO: 0.1 X10E3/UL (ref 0–0.4)
EOSINOPHIL NFR BLD AUTO: 1 %
ERYTHROCYTE [DISTWIDTH] IN BLOOD BY AUTOMATED COUNT: 13.9 % (ref 12.3–15.4)
GLOBULIN SER CALC-MCNC: 2.2 G/DL (ref 1.5–4.5)
GLUCOSE SERPL-MCNC: 79 MG/DL (ref 65–99)
HCT VFR BLD AUTO: 40.4 % (ref 34–46.6)
HDLC SERPL-MCNC: 83 MG/DL
HGB BLD-MCNC: 13.4 G/DL (ref 11.1–15.9)
IMM GRANULOCYTES # BLD: 0 X10E3/UL (ref 0–0.1)
IMM GRANULOCYTES NFR BLD: 0 %
INTERPRETATION, 910389: NORMAL
LDLC SERPL CALC-MCNC: 69 MG/DL (ref 0–99)
LYMPHOCYTES # BLD AUTO: 1.7 X10E3/UL (ref 0.7–3.1)
LYMPHOCYTES NFR BLD AUTO: 34 %
MCH RBC QN AUTO: 30.5 PG (ref 26.6–33)
MCHC RBC AUTO-ENTMCNC: 33.2 G/DL (ref 31.5–35.7)
MCV RBC AUTO: 92 FL (ref 79–97)
MONOCYTES # BLD AUTO: 0.3 X10E3/UL (ref 0.1–0.9)
MONOCYTES NFR BLD AUTO: 7 %
NEUTROPHILS # BLD AUTO: 2.9 X10E3/UL (ref 1.4–7)
NEUTROPHILS NFR BLD AUTO: 58 %
PLATELET # BLD AUTO: 247 X10E3/UL (ref 150–379)
POTASSIUM SERPL-SCNC: 4.1 MMOL/L (ref 3.5–5.2)
PROT SERPL-MCNC: 6.6 G/DL (ref 6–8.5)
RBC # BLD AUTO: 4.39 X10E6/UL (ref 3.77–5.28)
SODIUM SERPL-SCNC: 140 MMOL/L (ref 134–144)
T4 FREE SERPL-MCNC: 1.8 NG/DL (ref 0.82–1.77)
TRIGL SERPL-MCNC: 55 MG/DL (ref 0–149)
TSH SERPL DL<=0.005 MIU/L-ACNC: 0.9 UIU/ML (ref 0.45–4.5)
VLDLC SERPL CALC-MCNC: 11 MG/DL (ref 5–40)
WBC # BLD AUTO: 5 X10E3/UL (ref 3.4–10.8)

## 2018-10-26 ENCOUNTER — HOSPITAL ENCOUNTER (OUTPATIENT)
Dept: PHYSICAL THERAPY | Age: 63
Discharge: HOME | End: 2018-10-26
Payer: COMMERCIAL

## 2018-10-26 PROCEDURE — 97110 THERAPEUTIC EXERCISES: CPT | Performed by: PHYSICAL THERAPIST

## 2018-10-26 PROCEDURE — 97161 PT EVAL LOW COMPLEX 20 MIN: CPT | Performed by: PHYSICAL THERAPIST

## 2018-10-26 NOTE — PROGRESS NOTES
PT INITIAL EVALUATION NOTE 2-15 Patient Name: Roderick Owusu Date:10/26/2018 : 1955 [x]  Patient  Verified Payor: ALICIA Biba / Plan: [address] [place] [address] / Product Type: PPO / In time:11:00 AM  Out time:12:00 PM 
Total Treatment Time (min): 60 Visit #: 1 Treatment Area: Left shoulder pain [M25.512] SUBJECTIVE Pain Level (0-10 scale): 3/10 Any medication changes, allergies to medications, adverse drug reactions, diagnosis change, or new procedure performed?: [] No    [x] Yes (see summary sheet for update) Subjective:    
Left shoulder pain PLOF: No limitations with overhead reaching, lifting Mechanism of Injury: Insidious, began 6 months ago. The pain radiates from the posterior shoulder down into the triceps. Previous Treatment/Compliance: On a recent f/u with her PCP, she was referred to PT. No imaging has been performed. She has had PT for the same problem several years ago and the therapy focused on RTC exercises. PMHx/Surgical Hx: Depression Work Hx: n/a Living Situation: lives at home with family Pt Goals: to reduce pain and improve mobility Barriers: chronicity Motivation: very motivated Substance use: non FABQ Score: n/a Cognition: A & O x 4 OBJECTIVE/EXAMINATION Posture: Forward head and rounded shoulders in seated Palpation: TTP along the posterior deltoid of the right shoulder Joint Mobility: NT Scapulohumoral Control / Rhythm:  
Able to eccentrically lower with good control? Left: [] Yes  [x] No         Right: [x] Yes  [] No 
 
 
Shoulder AROM, PROM:    
    R   L Shoulder Flexion  170   170 Shoulder Abduction   150   100 Shoulder Extension  45   45 Horizontal Abduction  +20   +10 Shoulder ER   80   80 Shoulder IR   45   55 MMT:  
Shoulder flexion  5/5   4/5 Shoulder ER   5/5   4/5 Shoulder IR   5/5   5/5 Neurological: Sensations: Intact Special Tests: 
 Painful Arc: Positive Kruse:Positive Neer's:Negative Empty/Full Can Test: Negative Modality rationale: Patient declined Min Type Additional Details  
 [] Estim: []Att   []Unatt        []TENS instruct []IFC  []Premod   []NMES []Other:  []w/US   []w/ice   []w/heat Position: Location:  
 []  Traction: [] Cervical       []Lumbar 
                     [] Prone          []Supine []Intermittent   []Continuous Lbs: 
[] before manual 
[] after manual 
[]w/heat  
 []  Ultrasound: []Continuous   [] Pulsed at:  
                         []1MHz   []3MHz Location: 
W/cm2:  
 []  Paraffin Location: 
[]w/heat  
 []  Ice     []  Heat 
[]  Ice massage Position: Location:  
 []  Laser 
[]  Other: Position: Location:  
 []  Vasopneumatic Device Pressure:       [] lo [] med [] hi  
Temperature:   
[x] Skin assessment post-treatment:  [x]intact []redness- no adverse reaction 
  []redness  adverse reaction:  
 
40 min Therapeutic Exercise:  [x] See flow sheet :  
Rationale: increase ROM, increase strength and improve coordination to improve the patients ability to reach overhead With 
 [] TE 
 [] TA 
 [] neuro 
 [] other: Patient Education: [x] Review HEP [] Progressed/Changed HEP based on:  
[] positioning   [] body mechanics   [] transfers   [] heat/ice application   
[] other:   
 
 
Other Objective/Functional Measures: 
 
Pain Level (0-10 scale) post treatment: 0 
 
 
ASSESSMENT:  
  
[x]  See Plan of Care Tere Valenzuela PT , DPT, OCS, Cert.  DN 
 10/26/2018  10:59 AM

## 2018-10-26 NOTE — PROGRESS NOTES
[employer] [facility] [address] [facility], [address] Jaiden Jaquez Phone: [phone]  Fax: [phone] Plan of Care/ Statement of Necessity for Physical Therapy Services 2-15 Patient name: Kaelyn Mckeon  : 1955  Provider#: [id number] Referral source: Belen Pizarro MD     
Medical/Treatment Diagnosis: Left shoulder pain [M25.512] Prior Hospitalization: see medical history Comorbidities: None Prior Level of Function: see initial eval 
Medications: Verified on Patient Summary List 
 
Start of Care: 10/26/18      Onset Date: 2018 The Plan of Care and following information is based on the information from the initial evaluation. Assessment/ key information: Patient presents with left shoulder impingement syndrome with an insidious onset 6 months ago. Today she presented with decreased ROM, impaired RTC strength, and poor tolerance to reaching forward. Evaluation Complexity History LOW Complexity : Zero comorbidities / personal factors that will impact the outcome / POC; Examination LOW Complexity : 1-2 Standardized tests and measures addressing body structure, function, activity limitation and / or participation in recreation  ;Presentation MEDIUM Complexity : Evolving with changing characteristics  ; Clinical Decision Making MEDIUM Complexity : FOTO score of 26-74 Overall Complexity Rating: LOW Problem List: pain affecting function, decrease ROM, decrease strength, decrease ADL/ functional abilitiies, decrease activity tolerance, decrease flexibility/ joint mobility and decrease transfer abilities Treatment Plan may include any combination of the following: Therapeutic exercise, Therapeutic activities, Neuromuscular re-education, Physical agent/modality, Gait/balance training, Manual therapy and Patient education Patient / Family readiness to learn indicated by: asking questions, trying to perform skills and interest 
 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  Patient Self Reported Health Status: excellent Rehabilitation Potential: excellent Short Term Goals: To be accomplished in 8 treatments: 1. Patient will be able to demonstrate WNL AROM abduction to allow for improved ability to take off a shirt. 2. Patient will be able to ring a 1# bell for 10 repetitions without pain or limitation. 3. Patient will be able to fasten her seat belt with the L UE with no pain or limitations. Long Term Goals: To be accomplished in 16 treatments: 1. Patient will be able to lift 1# over her head with no pain or limitation. 2. Patient will be able to ring a 3# for 10 repetitions without pain or limitation. 3. Patient will be able to carry a 10# bag with her L UE with no pain or limitation. Frequency / Duration: Patient to be seen 2 times per week for 8 weeks. Patient/ Caregiver education and instruction: self care, activity modification and exercises 
 
[x]  Plan of care has been reviewed with EULALIA Garrett, PT , DPT, OCS, Cert. DN 
 10/26/2018 12:45 PM 
 
________________________________________________________________________ I certify that the above Therapy Services are being furnished while the patient is under my care. I agree with the treatment plan and certify that this therapy is necessary. [phone] Signature:____________________  Date:____________Time: _________

## 2018-10-29 ENCOUNTER — HOSPITAL ENCOUNTER (OUTPATIENT)
Dept: PHYSICAL THERAPY | Age: 63
Discharge: HOME | End: 2018-10-29
Payer: COMMERCIAL

## 2018-10-29 PROCEDURE — 97110 THERAPEUTIC EXERCISES: CPT

## 2018-10-29 NOTE — PROGRESS NOTES
PT DAILY TREATMENT NOTE - [facility] -15 Patient Name: Vincenzo Chacon Date:10/29/2018 : 1955 [x]  Patient  Verified Payor: ALICIA XIMENA / Plan: [address] [place] [address] / Product Type: PPO / In time:3:00p  Out time:3:55p Total Treatment Time (min): 55 Total Timed Codes (min): 55 
1:1 Treatment Time ( only): 55 Visit #: 2 Treatment Area: Pain in left shoulder [M25.512] SUBJECTIVE Pain Level (0-10 scale): 3 Any medication changes, allergies to medications, adverse drug reactions, diagnosis change, or new procedure performed?: [x] No    [] Yes (see summary sheet for update) Subjective functional status/changes:   [] No changes reported Patient reports compliance with HEP. Patient states she continues to be sore and has occasional sharp pain with some movements. OBJECTIVE 55 min Therapeutic Exercise:  [x] See flow sheet :  
Rationale: increase ROM and increase strength to improve the patients ability to lift, carry, reach, and complete ADL's With 
 [] TE 
 [] TA 
 [] neuro 
 [] other: Patient Education: [x] Review HEP [] Progressed/Changed HEP based on:  
[] positioning   [] body mechanics   [] transfers   [] heat/ice application   
[] other:   
 
Other Objective/Functional Measures: min soreness after interventions which decreased after stretching, pt reports catching with cane abduction, no pain Pain Level (0-10 scale) post treatment: 0 
 
ASSESSMENT/Changes in Function:  
 
Patient will continue to benefit from skilled PT services to modify and progress therapeutic interventions, address functional mobility deficits, address ROM deficits, address strength deficits, analyze and address soft tissue restrictions, analyze and cue movement patterns, analyze and modify body mechanics/ergonomics and assess and modify postural abnormalities to attain remaining goals. []  See Plan of Care 
[]  See progress note/recertification 
[]  See Discharge Summary Progress towards goals / Updated goals: 
Patient demonstrates good tolerance for interventions with few verbal cues required for proper mechanics and postural awareness. Patient will do well with continued progression to improve shoulder stabilization and strength. PLAN [x]  Upgrade activities as tolerated     [x]  Continue plan of care 
[]  Update interventions per flow sheet      
[]  Discharge due to:_ 
[]  Other:_ Mckenzie Chacon 10/29/2018  3:00 PM

## 2018-11-01 ENCOUNTER — HOSPITAL ENCOUNTER (OUTPATIENT)
Dept: PHYSICAL THERAPY | Age: 63
Discharge: HOME | End: 2018-11-01
Payer: COMMERCIAL

## 2018-11-01 PROCEDURE — 97110 THERAPEUTIC EXERCISES: CPT

## 2018-11-06 ENCOUNTER — HOSPITAL ENCOUNTER (OUTPATIENT)
Dept: PHYSICAL THERAPY | Age: 63
End: 2018-11-06
Payer: COMMERCIAL

## 2018-11-06 DIAGNOSIS — E03.4 HYPOTHYROIDISM DUE TO ACQUIRED ATROPHY OF THYROID: ICD-10-CM

## 2018-11-06 RX ORDER — LEVOTHYROXINE SODIUM 75 UG/1
TABLET ORAL
Qty: 90 TAB | Refills: 3 | Status: SHIPPED | OUTPATIENT
Start: 2018-11-06 | End: 2018-12-06 | Stop reason: SDUPTHER

## 2018-11-08 ENCOUNTER — HOSPITAL ENCOUNTER (OUTPATIENT)
Dept: PHYSICAL THERAPY | Age: 63
Discharge: HOME | End: 2018-11-08
Payer: COMMERCIAL

## 2018-11-08 PROCEDURE — 97110 THERAPEUTIC EXERCISES: CPT

## 2018-11-08 PROCEDURE — 97140 MANUAL THERAPY 1/> REGIONS: CPT

## 2018-11-08 NOTE — PROGRESS NOTES
PT DAILY TREATMENT NOTE - [facility] 2-15 Patient Name: Teodoro Miller Date:2018 : 1955 [x]  Patient  Verified Payor: BLUE [employer]OSS / Plan: [address] [place] [address] / Product Type: PPO / In time:2:00p  Out time: 3:00p Total Treatment Time (min): 60 Total Timed Codes (min): 60 
1:1 Treatment Time ( [address] only): 60 Visit #: 4 Treatment Area: Pain in left shoulder [M25.512] SUBJECTIVE Pain Level (0-10 scale): 3 Any medication changes, allergies to medications, adverse drug reactions, diagnosis change, or new procedure performed?: [x] No    [] Yes (see summary sheet for update) Subjective functional status/changes:   [x] No changes reported Patient reports she has not noticed any changes and continues to have difficulty with activities. OBJECTIVE 45 min Therapeutic Exercise:  [x] See flow sheet :  
Rationale: increase ROM and increase strength to improve the patients ability to lift, carry, reach, and complete ADL's 
   
10 min Manual Therapy: MFR shoulder stabilizers, posterior deltoid, triceps, pecs; shoulder posterior, inferior mobs grade III Rationale: increase ROM and increase strength to improve the patients ability to lift, carry, reach, and complete ADL's With 
 [] TE 
 [] TA 
 [] neuro 
 [] other: Patient Education: [x] Review HEP [] Progressed/Changed HEP based on:  
[] positioning   [] body mechanics   [] transfers   [] heat/ice application   
[] other:   
 
Other Objective/Functional Measures: Mod fatigue with scaption and flexion noted Pain Level (0-10 scale) post treatment: 0 
 
ASSESSMENT/Changes in Function:  
 
Patient will continue to benefit from skilled PT services to modify and progress therapeutic interventions, address functional mobility deficits, address ROM deficits, address strength deficits, analyze and address soft tissue restrictions, analyze and cue movement patterns, analyze and modify body mechanics/ergonomics and assess and modify postural abnormalities to attain remaining goals. []  See Plan of Care 
[]  See progress note/recertification 
[]  See Discharge Summary Progress towards goals / Updated goals: 
Patient demonstrates good tolerance for interventions with few verbal cues required for proper mechanics and postural awareness. Patient will do well with continued progression to improve shoulder stabilization and strength. PLAN [x]  Upgrade activities as tolerated     [x]  Continue plan of care 
[]  Update interventions per flow sheet      
[]  Discharge due to:_ 
[]  Other:_ Dixon Jules 11/8/2018  2:00 PM

## 2018-11-13 ENCOUNTER — HOSPITAL ENCOUNTER (OUTPATIENT)
Dept: PHYSICAL THERAPY | Age: 63
Discharge: HOME | End: 2018-11-13
Payer: COMMERCIAL

## 2018-11-13 ENCOUNTER — HOSPITAL ENCOUNTER (OUTPATIENT)
Dept: MAMMOGRAPHY | Age: 63
Discharge: HOME | End: 2018-11-13
Attending: INTERNAL MEDICINE
Payer: COMMERCIAL

## 2018-11-13 ENCOUNTER — TELEPHONE (OUTPATIENT)
Dept: INTERNAL MEDICINE CLINIC | Age: 63
End: 2018-11-13

## 2018-11-13 DIAGNOSIS — Z12.39 BREAST SCREENING: ICD-10-CM

## 2018-11-13 PROCEDURE — 97140 MANUAL THERAPY 1/> REGIONS: CPT | Performed by: PHYSICAL MEDICINE & REHABILITATION

## 2018-11-13 PROCEDURE — 77067 SCR MAMMO BI INCL CAD: CPT

## 2018-11-13 PROCEDURE — 97110 THERAPEUTIC EXERCISES: CPT | Performed by: PHYSICAL MEDICINE & REHABILITATION

## 2018-11-13 NOTE — PROGRESS NOTES
PT DAILY TREATMENT NOTE - [facility] 2-15 Patient Name: Martha Osorio Date:2018 : 1955 [x]  Patient  Verified Payor: ALICIA XIMENA / Plan: [address] [place] [address] / Product Type: PPO / In time:305 pm  Out time: 345 pm 
Total Treatment Time (min): 40 Total Timed Codes (min): 40 
1:1 Treatment Time ( only): 40 Visit #: 4 Treatment Area: Pain in left shoulder [M25.512] SUBJECTIVE Pain Level (0-10 scale): 1 Any medication changes, allergies to medications, adverse drug reactions, diagnosis change, or new procedure performed?: [x] No    [] Yes (see summary sheet for update) Subjective functional status/changes:   [x] No changes reported Patient reports she is doing much better since last visit. The soft tissue helped a lot. OBJECTIVE 30 min Therapeutic Exercise:  [x] See flow sheet :  
Rationale: increase ROM and increase strength to improve the patients ability to lift, carry, reach, and complete ADL's 
   
10 min Manual Therapy: MFR shoulder stabilizers, posterior deltoid, triceps, pecs; shoulder posterior, inferior mobs grade III Rationale: increase ROM and increase strength to improve the patients ability to lift, carry, reach, and complete ADL's With 
 [] TE 
 [] TA 
 [] neuro 
 [] other: Patient Education: [x] Review HEP [] Progressed/Changed HEP based on:  
[] positioning   [] body mechanics   [] transfers   [] heat/ice application   
[] other:   
 
Other Objective/Functional Measures:  
Great P/AROM noted. Patient with great tolerance with today's exercises. Instructed patient in using tennis ball for STM/active release.  
Pain Level (0-10 scale) post treatment: 0 
 
ASSESSMENT/Changes in Function:  
 
Patient will continue to benefit from skilled PT services to modify and progress therapeutic interventions, address functional mobility deficits, address ROM deficits, address strength deficits, analyze and address soft tissue restrictions, analyze and cue movement patterns, analyze and modify body mechanics/ergonomics and assess and modify postural abnormalities to attain remaining goals. []  See Plan of Care 
[]  See progress note/recertification 
[]  See Discharge Summary Progress towards goals / Updated goals: 
Patient demonstrates good tolerance for interventions with few verbal cues required for proper mechanics and postural awareness. Patient will do well with continued progression to improve shoulder stabilization and strength. PLAN [x]  Upgrade activities as tolerated     [x]  Continue plan of care [x]  Update interventions per flow sheet      
[]  Discharge due to:_ 
[]  Other:_ Momo Wallace PTA, CPT 11/13/2018  2:00 PM

## 2018-11-15 ENCOUNTER — HOSPITAL ENCOUNTER (OUTPATIENT)
Dept: PHYSICAL THERAPY | Age: 63
Discharge: HOME | End: 2018-11-15
Payer: COMMERCIAL

## 2018-11-15 PROCEDURE — 97140 MANUAL THERAPY 1/> REGIONS: CPT

## 2018-11-15 PROCEDURE — 97110 THERAPEUTIC EXERCISES: CPT

## 2018-11-15 NOTE — PROGRESS NOTES
PT DAILY TREATMENT NOTE - [facility] 2-15 Patient Name: Donna Reason Date:11/15/2018 : 1955 [x]  Patient  Verified Payor: ALICIA XIMENA / Plan: [address] [place] [address] / Product Type: PPO / In time:12:00p  Out time: 12:40p Total Treatment Time (min): 40 Total Timed Codes (min): 40 
1:1 Treatment Time ( only): 25 Visit #: 4 Treatment Area: Pain in left shoulder [M25.512] SUBJECTIVE Pain Level (0-10 scale): 1 Any medication changes, allergies to medications, adverse drug reactions, diagnosis change, or new procedure performed?: [x] No    [] Yes (see summary sheet for update) Subjective functional status/changes:   [x] No changes reported Patient reports she is doing much better since last visit. The soft tissue helped a lot. OBJECTIVE 30 min Therapeutic Exercise:  [x] See flow sheet :  
Rationale: increase ROM and increase strength to improve the patients ability to lift, carry, reach, and complete ADL's 
   
10 min Manual Therapy: MFR shoulder stabilizers, posterior deltoid, triceps, pecs; shoulder posterior, inferior mobs grade III Rationale: increase ROM and increase strength to improve the patients ability to lift, carry, reach, and complete ADL's With 
 [] TE 
 [] TA 
 [] neuro 
 [] other: Patient Education: [x] Review HEP [] Progressed/Changed HEP based on:  
[] positioning   [] body mechanics   [] transfers   [] heat/ice application   
[] other:   
 
Other Objective/Functional Measures:  
Great P/AROM noted. Patient with great tolerance with today's exercises.  
 
 
Pain Level (0-10 scale) post treatment: 0 
 
ASSESSMENT/Changes in Function:  
 
Patient will continue to benefit from skilled PT services to modify and progress therapeutic interventions, address functional mobility deficits, address ROM deficits, address strength deficits, analyze and address soft tissue restrictions, analyze and cue movement patterns, analyze and modify body mechanics/ergonomics and assess and modify postural abnormalities to attain remaining goals. []  See Plan of Care 
[]  See progress note/recertification 
[]  See Discharge Summary Progress towards goals / Updated goals: 
Patient demonstrates good tolerance for interventions with few verbal cues required for proper mechanics and postural awareness. Patient will do well with continued progression to improve shoulder stabilization and strength. PLAN [x]  Upgrade activities as tolerated     [x]  Continue plan of care [x]  Update interventions per flow sheet      
[]  Discharge due to:_ 
[]  Other:_ Randolph Rocha, PTA 11/15/2018  2:00 PM

## 2018-11-19 ENCOUNTER — HOSPITAL ENCOUNTER (OUTPATIENT)
Dept: PHYSICAL THERAPY | Age: 63
Discharge: HOME | End: 2018-11-19
Payer: COMMERCIAL

## 2018-11-19 PROCEDURE — 97110 THERAPEUTIC EXERCISES: CPT

## 2018-11-19 PROCEDURE — 97140 MANUAL THERAPY 1/> REGIONS: CPT

## 2018-11-19 NOTE — PROGRESS NOTES
PT DAILY TREATMENT NOTE - [facility] 2-15 Patient Name: Linda Griggs Date:2018 : 1955 [x]  Patient  Verified Payor: BLUE CROSS / Plan: [address] [place] [address] / Product Type: PPO / In time: 2:45p  Out time: 3:25p Total Treatment Time (min): 40 Total Timed Codes (min): 40 
1:1 Treatment Time ( only): 40 Visit #: 3 Treatment Area: Pain in left shoulder [M25.512] SUBJECTIVE Pain Level (0-10 scale): 0 Any medication changes, allergies to medications, adverse drug reactions, diagnosis change, or new procedure performed?: [x] No    [] Yes (see summary sheet for update) Subjective functional status/changes:   [x] No changes reported Patient reports she still gets fatigued, but noticed some improvements. OBJECTIVE 30 min Therapeutic Exercise:  [x] See flow sheet :  
Rationale: increase ROM and increase strength to improve the patients ability to lift, carry, reach, and complete ADL's 
   
10 min Manual Therapy: MFR shoulder stabilizers, posterior deltoid, pecs; shoulder posterior, inferior mobs grade III Rationale: increase ROM and increase strength to improve the patients ability to lift, carry, reach, and complete ADL's With 
 [] TE 
 [] TA 
 [] neuro 
 [] other: Patient Education: [x] Review HEP [] Progressed/Changed HEP based on:  
[] positioning   [] body mechanics   [] transfers   [] heat/ice application   
[] other:   
 
Other Objective/Functional Measures: Patient with great tolerance with today's exercises.  
 
 
Pain Level (0-10 scale) post treatment: 0 
 
ASSESSMENT/Changes in Function:  
 
Patient will continue to benefit from skilled PT services to modify and progress therapeutic interventions, address functional mobility deficits, address ROM deficits, address strength deficits, analyze and address soft tissue restrictions, analyze and cue movement patterns, analyze and modify body mechanics/ergonomics and assess and modify postural abnormalities to attain remaining goals. []  See Plan of Care 
[]  See progress note/recertification 
[]  See Discharge Summary Progress towards goals / Updated goals: 
Patient demonstrates good tolerance for interventions with few verbal cues required for proper mechanics and postural awareness. Patient will do well with continued progression to improve shoulder stabilization and strength. PLAN [x]  Upgrade activities as tolerated     [x]  Continue plan of care [x]  Update interventions per flow sheet      
[]  Discharge due to:_ 
[]  Other:_ Dalton Viveros, PTA 11/19/2018  2:00 PM

## 2018-11-20 ENCOUNTER — TELEPHONE (OUTPATIENT)
Dept: INTERNAL MEDICINE CLINIC | Age: 63
End: 2018-11-20

## 2018-11-20 NOTE — TELEPHONE ENCOUNTER
Pt left her advanced medical directive and will pick them up Wednesday morning. Any questions please call her mobile. I gave it to the nurse.     Thanks

## 2018-11-21 ENCOUNTER — HOSPITAL ENCOUNTER (OUTPATIENT)
Dept: PHYSICAL THERAPY | Age: 63
Discharge: HOME | End: 2018-11-21
Payer: COMMERCIAL

## 2018-11-21 PROCEDURE — 97110 THERAPEUTIC EXERCISES: CPT | Performed by: PHYSICAL THERAPIST

## 2018-11-21 NOTE — PROGRESS NOTES
[facility] Physical Therapy [address] [facility], [address] Jaiden Jaquez Phone: [phone]  Fax: [phone] Discharge Summary  2-15 Patient name: Annika Jackson  : 1955  Provider#: [id number] Referral source: Jayesh Fierro MD     
Medical/Treatment Diagnosis: Pain in left shoulder [M25.512] Prior Hospitalization: see medical history Comorbidities: See Plan of Care Prior Level of Function:See Plan of Care Medications: Verified on Patient Summary List 
 
Start of Care:10/26/18      Onset Date:2018 Visits from Start of Care: 8     Missed Visits: 0 Reporting Period : 10/26/18 to 18 ASSESSMENT/SUMMARY OF CARE: Mrs. Martinez Lucero did very well with PT with a focus on a progression of therapeutic exercises that led to an achievement of WNL ROM and strength. She has achieved all long term goals and no longer requires PT services. Short Term Goals: To be accomplished in 8 treatments: 1. Patient will be able to demonstrate WNL AROM abduction to allow for improved ability to take off a shirt. Met. 
2. Patient will be able to ring a 1# bell for 10 repetitions without pain or limitation. Met. 
3. Patient will be able to fasten her seat belt with the L UE with no pain or limitations. Met. 
Long Term Goals: To be accomplished in 16 treatments: 1. Patient will be able to lift 1# over her head with no pain or limitation. Met. 
2. Patient will be able to ring a 3# for 10 repetitions without pain or limitation. Met. 
3. Patient will be able to carry a 10# bag with her L UE with no pain or limitation. Met. 
 
RECOMMENDATIONS: 
[x]Discontinue therapy: [x]Patient has reached or is progressing toward set goals []Patient is non-compliant or has abdicated 
    []Due to lack of appreciable progress towards set goals []Other Tere Valenzuela, PT , DPT, OCS, Cert.  DN 
 2018 9:48 AM

## 2018-11-21 NOTE — PROGRESS NOTES
PT DAILY TREATMENT NOTE - [facility] 2-15 Patient Name: Dania Dempsey Date:2018 : 1955 [x]  Patient  Verified Payor: BLUE CROSS / Plan: [address] [place] [address] / Product Type: PPO / In time: 9:00 AM  Out time: 9:40 AM 
Total Treatment Time (min): 40 Total Timed Codes (min): 40 
1:1 Treatment Time ( only): 30 Visit #: 0 Treatment Area: Pain in left shoulder [M25.512] SUBJECTIVE Pain Level (0-10 scale): 0 Any medication changes, allergies to medications, adverse drug reactions, diagnosis change, or new procedure performed?: [x] No    [] Yes (see summary sheet for update) Subjective functional status/changes:   [x] No changes reported Patient reports no significant pain since her last visit and feels comfortable moving forward with her HEP. OBJECTIVE 30 min Therapeutic Exercise:  [x] See flow sheet :  
Rationale: increase ROM and increase strength to improve the patients ability to lift, carry, reach, and complete ADL's 
   
10 min Manual Therapy: MFR shoulder stabilizers, posterior deltoid, pecs; shoulder posterior, inferior mobs grade III Rationale: increase ROM and increase strength to improve the patients ability to lift, carry, reach, and complete ADL's With 
 [] TE 
 [] TA 
 [] neuro 
 [] other: Patient Education: [x] Review HEP [] Progressed/Changed HEP based on:  
[] positioning   [] body mechanics   [] transfers   [] heat/ice application   
[] other:   
 
Other Objective/Functional Measures:  
ROM : WNL in all directions without pain Pain Level (0-10 scale) post treatment: 0 
 
ASSESSMENT/Changes in Function:  
 
Patient will continue to benefit from skilled PT services to modify and progress therapeutic interventions, address functional mobility deficits, address ROM deficits, address strength deficits, analyze and address soft tissue restrictions, analyze and cue movement patterns, analyze and modify body mechanics/ergonomics and assess and modify postural abnormalities to attain remaining goals. []  See Plan of Care 
[]  See progress note/recertification 
[x]  See Discharge Summary PLAN [x]  Upgrade activities as tolerated     [x]  Continue plan of care [x]  Update interventions per flow sheet      
[]  Discharge due to:_ 
[]  Other:_   
 
Jose Dewitt, PT  , DPT, OCS, Cert.  DN 
 11/21/2018  2:00 PM

## 2018-12-06 DIAGNOSIS — E03.4 HYPOTHYROIDISM DUE TO ACQUIRED ATROPHY OF THYROID: ICD-10-CM

## 2018-12-06 RX ORDER — LEVOTHYROXINE SODIUM 75 UG/1
TABLET ORAL
Qty: 90 TAB | Refills: 1 | Status: SHIPPED | OUTPATIENT
Start: 2018-12-06 | End: 2019-04-22 | Stop reason: SDUPTHER

## 2018-12-06 RX ORDER — FLUOXETINE 10 MG/1
CAPSULE ORAL
Qty: 90 CAP | Refills: 1 | Status: SHIPPED | OUTPATIENT
Start: 2018-12-06 | End: 2019-04-22 | Stop reason: SDUPTHER

## 2018-12-10 RX ORDER — CALCITONIN SALMON 200 [IU]/.09ML
SPRAY, METERED NASAL
Qty: 11.1 ML | Refills: 2 | Status: SHIPPED | OUTPATIENT
Start: 2018-12-10 | End: 2018-12-12 | Stop reason: SDUPTHER

## 2018-12-11 RX ORDER — ROSUVASTATIN CALCIUM 10 MG/1
TABLET, COATED ORAL
Qty: 90 TAB | Refills: 1 | Status: SHIPPED | OUTPATIENT
Start: 2018-12-11 | End: 2019-05-02 | Stop reason: SDUPTHER

## 2018-12-12 ENCOUNTER — TELEPHONE (OUTPATIENT)
Dept: INTERNAL MEDICINE CLINIC | Age: 63
End: 2018-12-12

## 2018-12-12 RX ORDER — CALCITONIN SALMON 200 [IU]/.09ML
SPRAY, METERED NASAL
Qty: 11.1 ML | Refills: 2 | Status: SHIPPED | OUTPATIENT
Start: 2018-12-12 | End: 2019-04-08

## 2018-12-12 NOTE — TELEPHONE ENCOUNTER
----- Message from Farrah De Guzman sent at 12/12/2018  9:40 AM EST -----  Regarding: Dr. Nate Andino ([address]) is requesting a response from fax order for Calcitonin.  Best contact: [phone]

## 2019-01-23 ENCOUNTER — OFFICE VISIT (OUTPATIENT)
Dept: INTERNAL MEDICINE CLINIC | Age: 64
End: 2019-01-23

## 2019-01-23 VITALS
OXYGEN SATURATION: 96 % | DIASTOLIC BLOOD PRESSURE: 80 MMHG | RESPIRATION RATE: 14 BRPM | BODY MASS INDEX: 26.93 KG/M2 | TEMPERATURE: 98 F | SYSTOLIC BLOOD PRESSURE: 120 MMHG | HEART RATE: 63 BPM | HEIGHT: 63 IN | WEIGHT: 152 LBS

## 2019-01-23 DIAGNOSIS — J01.41 ACUTE RECURRENT PANSINUSITIS: Primary | ICD-10-CM

## 2019-01-23 DIAGNOSIS — R05.9 COUGH: ICD-10-CM

## 2019-01-23 RX ORDER — AMOXICILLIN AND CLAVULANATE POTASSIUM 875; 125 MG/1; MG/1
1 TABLET, FILM COATED ORAL EVERY 12 HOURS
Qty: 20 TAB | Refills: 0 | Status: SHIPPED | OUTPATIENT
Start: 2019-01-23 | End: 2019-04-08 | Stop reason: ALTCHOICE

## 2019-01-23 RX ORDER — CODEINE PHOSPHATE AND GUAIFENESIN 10; 100 MG/5ML; MG/5ML
5 SOLUTION ORAL
Qty: 105 ML | Refills: 0 | Status: SHIPPED | OUTPATIENT
Start: 2019-01-23 | End: 2019-04-08

## 2019-01-23 NOTE — PATIENT INSTRUCTIONS
Cough: Care Instructions  Your Care Instructions    A cough is your body's response to something that bothers your throat or airways. Many things can cause a cough. You might cough because of a cold or the flu, bronchitis, or asthma. Smoking, postnasal drip, allergies, and stomach acid that backs up into your throat also can cause coughs. A cough is a symptom, not a disease. Most coughs stop when the cause, such as a cold, goes away. You can take a few steps at home to cough less and feel better. Follow-up care is a key part of your treatment and safety. Be sure to make and go to all appointments, and call your doctor if you are having problems. It's also a good idea to know your test results and keep a list of the medicines you take. How can you care for yourself at home? · Drink lots of water and other fluids. This helps thin the mucus and soothes a dry or sore throat. Honey or lemon juice in hot water or tea may ease a dry cough. · Take cough medicine as directed by your doctor. · Prop up your head on pillows to help you breathe and ease a dry cough. · Try cough drops to soothe a dry or sore throat. Cough drops don't stop a cough. Medicine-flavored cough drops are no better than candy-flavored drops or hard candy. · Do not smoke. Avoid secondhand smoke. If you need help quitting, talk to your doctor about stop-smoking programs and medicines. These can increase your chances of quitting for good. When should you call for help? Call 911 anytime you think you may need emergency care.  For example, call if:    · You have severe trouble breathing.    Call your doctor now or seek immediate medical care if:    · You cough up blood.     · You have new or worse trouble breathing.     · You have a new or higher fever.     · You have a new rash.    Watch closely for changes in your health, and be sure to contact your doctor if:    · You cough more deeply or more often, especially if you notice more mucus or a change in the color of your mucus.     · You have new symptoms, such as a sore throat, an earache, or sinus pain.     · You do not get better as expected. Where can you learn more? Go to http://bro-kiana.info/. Enter D279 in the search box to learn more about \"Cough: Care Instructions. \"  Current as of: September 5, 2018  Content Version: 11.9  © 9688-7229 "[employer]. Care instructions adapted under license by [employer] (which disclaims liability or warranty for this information). If you have questions about a medical condition or this instruction, always ask your healthcare professional. [address] any warranty or liability for your use of this information. Sinusitis: Care Instructions  Your Care Instructions    Sinusitis is an infection of the lining of the sinus cavities in your head. Sinusitis often follows a cold. It causes pain and pressure in your head and face. In most cases, sinusitis gets better on its own in 1 to 2 weeks. But some mild symptoms may last for several weeks. Sometimes antibiotics are needed. Follow-up care is a key part of your treatment and safety. Be sure to make and go to all appointments, and call your doctor if you are having problems. It's also a good idea to know your test results and keep a list of the medicines you take. How can you care for yourself at home? · Take an over-the-counter pain medicine, such as acetaminophen (Tylenol), ibuprofen (Advil, Motrin), or naproxen (Aleve). Read and follow all instructions on the label. · If the doctor prescribed antibiotics, take them as directed. Do not stop taking them just because you feel better. You need to take the full course of antibiotics. · Be careful when taking over-the-counter cold or flu medicines and Tylenol at the same time. Many of these medicines have acetaminophen, which is Tylenol.  Read the labels to make sure that you are not taking more than the recommended dose. Too much acetaminophen (Tylenol) can be harmful. · Breathe warm, moist air from a steamy shower, a hot bath, or a sink filled with hot water. Avoid cold, dry air. Using a humidifier in your home may help. Follow the directions for cleaning the machine. · Use saline (saltwater) nasal washes to help keep your nasal passages open and wash out mucus and bacteria. You can buy saline nose drops at a grocery store or drugstore. Or you can make your own at home by adding 1 teaspoon of salt and 1 teaspoon of baking soda to 2 cups of distilled water. If you make your own, fill a bulb syringe with the solution, insert the tip into your nostril, and squeeze gently. Ronald Shy your nose. · Put a hot, wet towel or a warm gel pack on your face 3 or 4 times a day for 5 to 10 minutes each time. · Try a decongestant nasal spray like oxymetazoline (Afrin). Do not use it for more than 3 days in a row. Using it for more than 3 days can make your congestion worse. When should you call for help? Call your doctor now or seek immediate medical care if:    · You have new or worse swelling or redness in your face or around your eyes.     · You have a new or higher fever.    Watch closely for changes in your health, and be sure to contact your doctor if:    · You have new or worse facial pain.     · The mucus from your nose becomes thicker (like pus) or has new blood in it.     · You are not getting better as expected. Where can you learn more? Go to http://bro-kiana.info/. Enter Q796 in the search box to learn more about \"Sinusitis: Care Instructions. \"  Current as of: March 27, 2018  Content Version: 11.9  © 2881-9099 [employer]. Care instructions adapted under license by [employer] (which disclaims liability or warranty for this information).  If you have questions about a medical condition or this instruction, always ask your healthcare professional. [employer] disclaims any warranty or liability for your use of this information.

## 2019-01-23 NOTE — PROGRESS NOTES
HISTORY OF PRESENT ILLNESS  Sean Bright is a 70 y.o. female. HPI  Upper respiratory illness:  Sean Bright presents with complaints of sore throat, post nasal drip, dry cough, headache, bilateral sinus pain and yellow nasal discharge for 8 days. no nausea and no vomiting . she has not had  fever and chills. Symptoms are moderate. Over-the-counter remedies including Mucinex DM, Flonase nasal spray   has been used with good relief of symptoms. Drinking plenty of fluids: yes  Asthma?:  no  non-smoker  Contacts with similar infections: yes         Review of Systems   Constitutional: Positive for malaise/fatigue. Negative for chills and fever. HENT: Positive for congestion, sinus pain and sore throat. Respiratory: Positive for cough. Negative for sputum production and shortness of breath. Cardiovascular: Negative for chest pain and palpitations. Gastrointestinal: Negative for nausea and vomiting. Musculoskeletal: Negative for myalgias. Skin: Negative for rash. Neurological: Positive for headaches. Negative for dizziness. /80 (BP 1 Location: Left arm, BP Patient Position: Sitting)   Pulse 63   Temp 98 °F (36.7 °C) (Oral)   Resp 14   Ht 5' 3\" (1.6 m)   Wt 152 lb (68.9 kg)   LMP 10/01/2009   SpO2 96%   BMI 26.93 kg/m²   Physical Exam   Constitutional: She is oriented to person, place, and time. She appears well-developed and well-nourished. HENT:   Head: Normocephalic and atraumatic. Right Ear: External ear normal.   Left Ear: External ear normal.   Nose: Mucosal edema present. Right sinus exhibits maxillary sinus tenderness. Left sinus exhibits maxillary sinus tenderness. Mouth/Throat: Posterior oropharyngeal erythema present. No posterior oropharyngeal edema. Neck: Normal range of motion. Neck supple. No thyromegaly present. Cardiovascular: Normal rate and regular rhythm. Pulmonary/Chest: Effort normal and breath sounds normal. She has no wheezes.  She has no rales.   Lymphadenopathy:     She has no cervical adenopathy. Neurological: She is alert and oriented to person, place, and time. Skin: Skin is warm and dry. Psychiatric: She has a normal mood and affect. Her behavior is normal.   Nursing note and vitals reviewed. ASSESSMENT and PLAN  Diagnoses and all orders for this visit:    1. Acute recurrent pansinusitis  -     amoxicillin-clavulanate (AUGMENTIN) 875-125 mg per tablet; Take 1 Tab by mouth every twelve (12) hours. 2. Cough  -     guaiFENesin-codeine (ROBITUSSIN AC) 100-10 mg/5 mL solution; Take 5 mL by mouth three (3) times daily as needed for Cough. Max Daily Amount: 15 mL.       reviewed diet, exercise and weight control  reviewed medications and side effects in detail

## 2019-02-25 RX ORDER — MESALAMINE 0.38 G/1
1.5 CAPSULE, EXTENDED RELEASE ORAL DAILY
Qty: 360 CAP | Refills: 3 | Status: SHIPPED | OUTPATIENT
Start: 2019-02-25 | End: 2020-04-16 | Stop reason: ALTCHOICE

## 2019-04-08 ENCOUNTER — OFFICE VISIT (OUTPATIENT)
Dept: INTERNAL MEDICINE CLINIC | Age: 64
End: 2019-04-08

## 2019-04-08 VITALS
BODY MASS INDEX: 26.51 KG/M2 | OXYGEN SATURATION: 98 % | DIASTOLIC BLOOD PRESSURE: 68 MMHG | RESPIRATION RATE: 18 BRPM | HEART RATE: 64 BPM | SYSTOLIC BLOOD PRESSURE: 122 MMHG | WEIGHT: 149.6 LBS | TEMPERATURE: 98 F | HEIGHT: 63 IN

## 2019-04-08 DIAGNOSIS — F32.A MILD DEPRESSION: ICD-10-CM

## 2019-04-08 DIAGNOSIS — E03.4 HYPOTHYROIDISM DUE TO ACQUIRED ATROPHY OF THYROID: ICD-10-CM

## 2019-04-08 DIAGNOSIS — K51.818 OTHER ULCERATIVE COLITIS WITH OTHER COMPLICATION (HCC): ICD-10-CM

## 2019-04-08 DIAGNOSIS — Z00.00 WELCOME TO MEDICARE PREVENTIVE VISIT: Primary | ICD-10-CM

## 2019-04-08 DIAGNOSIS — E78.5 HYPERLIPIDEMIA LDL GOAL <130: ICD-10-CM

## 2019-04-08 DIAGNOSIS — Z78.0 POST-MENOPAUSAL: ICD-10-CM

## 2019-04-08 NOTE — PROGRESS NOTES
HISTORY OF PRESENT ILLNESS Serita Dubin is a 70 y.o. female. HPI Going to gym with [employer] -and working on bones Depression- doing well on prozac and tolerating medicine and feeling well Subjective:  
Serita Dubin is a 70 y.o. female with hyperlipidemia. Cardiovascular risk analysis - 70 y.o. female diabetic. ROS: taking medications as instructed, no medication side effects noted, no TIA's, no chest pain on exertion, no dyspnea on exertion, no swelling of ankles. Tolerating meds, no myalgias or other side effects noted New concerns: stable on medicine . SUBJECTIVE: Serita Dubin is a 70 y.o. female here for follow up of hypothyroidism. Lab Results Component Value Date/Time TSH 0.896 10/11/2018 10:19 AM  
 
Thyroid ROS: denies fatigue, weight changes, heat/cold intolerance, bowel/skin changes or CVS symptoms. Review of Systems Constitutional: Negative for chills, diaphoresis, fever, malaise/fatigue and weight loss. HENT: Negative for congestion, ear pain, hearing loss, sore throat and tinnitus. Eyes: Negative for blurred vision and double vision. Respiratory: Negative for cough, hemoptysis, sputum production, shortness of breath and wheezing. Cardiovascular: Negative for chest pain, palpitations, orthopnea, claudication, leg swelling and PND. Gastrointestinal: Negative for abdominal pain, blood in stool, constipation, diarrhea, heartburn, nausea and vomiting. Genitourinary: Negative for dysuria, flank pain, frequency, hematuria and urgency. Musculoskeletal: Negative for back pain, joint pain, myalgias and neck pain. Skin: Negative. Neurological: Negative for dizziness, tingling, sensory change, speech change, focal weakness, seizures, loss of consciousness, weakness and headaches. Endo/Heme/Allergies: Negative for environmental allergies and polydipsia. Does not bruise/bleed easily. Psychiatric/Behavioral: Negative for depression, memory loss, substance abuse and suicidal ideas. The patient is not nervous/anxious and does not have insomnia. Physical Exam  
Constitutional: She is oriented to person, place, and time. She appears well-developed and well-nourished. HENT:  
Head: Normocephalic and atraumatic. Right Ear: External ear normal.  
Left Ear: External ear normal.  
Nose: Nose normal.  
Mouth/Throat: Oropharynx is clear and moist.  
Eyes: Pupils are equal, round, and reactive to light. Conjunctivae and EOM are normal.  
Neck: Normal range of motion. Neck supple. Cardiovascular: Normal rate, regular rhythm, normal heart sounds and intact distal pulses. Pulmonary/Chest: Effort normal and breath sounds normal. Right breast exhibits no inverted nipple, no mass, no nipple discharge, no skin change and no tenderness. Left breast exhibits no inverted nipple, no mass, no nipple discharge, no skin change and no tenderness. No breast swelling, tenderness, discharge or bleeding. Breasts are symmetrical.  
Abdominal: Soft. Bowel sounds are normal.  
Genitourinary: Rectum normal. Rectal exam shows anal tone normal. No breast swelling, tenderness, discharge or bleeding. Musculoskeletal: Normal range of motion. Neurological: She is alert and oriented to person, place, and time. Skin: Skin is warm and dry. Psychiatric: She has a normal mood and affect. Her behavior is normal. Judgment and thought content normal.  
Nursing note and vitals reviewed. ASSESSMENT and PLAN Diagnoses and all orders for this visit: 
 
1. Welcome to Medicare preventive visit -     AMB POC EKG ROUTINE W/ 12 LEADS, INTER & REP 2. Mild depression (HCC)-cont with current dose of prozac and tolerating medicine 3. Hyperlipidemia LDL goal <130- tolerating medicine 4. Hypothyroidism due to acquired atrophy of thyroid-stable at current dose and doing well This note will not be viewable in [address] lab results and schedule of future lab studies reviewed with patient 
reviewed diet, exercise and weight control 
cardiovascular risk and specific lipid/LDL goals reviewed 
reviewed medications and side effects in detail This is a \"Welcome to [employer]"  Initial Preventive Physical Examination (IPPE) providing Personalized Prevention Plan Services (Performed in the first 12 months of enrollment) I have reviewed the patient's medical history in detail and updated the computerized patient record. History Past Medical History:  
Diagnosis Date  Hypercholesteremia  MD (muscular dystrophy) ([facility] [address]  Parkinson disease ([facility] [address]  Seasonal allergies  Skin benign neoplasm 2005  
 lipoma removed from upper right thigh  Thyroid disease  Ulcerative colitis ([facility] [address] 1973 Past Surgical History:  
Procedure Laterality Date  ENDOSCOPY, COLON, DIAGNOSTIC    
 1/13  normal- h/o UC   
 HX APPENDECTOMY  HX HEENT  02/2014  
 periodontal graft  HX OTHER SURGICAL Right 2005  
 lipoma  HX PACEMAKER PLACEMENT Left Current Outpatient Medications Medication Sig Dispense Refill  mesalamine ER (APRISO) 0.375 gram 24 hour capsule Take 4 Caps by mouth daily. 360 Cap 3  
 rosuvastatin (CRESTOR) 10 mg tablet TAKE 1 TABLET DAILY 90 Tab 1  
 levothyroxine (SYNTHROID) 75 mcg tablet TAKE 1 TABLET DAILY BEFORE BREAKFAST 90 Tab 1  
 FLUoxetine (PROZAC) 10 mg capsule TAKE 1 CAP BY MOUTH DAILY FOR 30 DAYS. 90 Cap 1  
 betamethasone valerate (VALISONE) 0.1 % topical cream Apply  to affected area two (2) times a day. 45 g 3  
 mesalamine ER (APRISO) 0.375 gram capsule Take 1.5 g by mouth daily.  ERGOCALCIFEROL (VITAMIN D PO) Take 1,000 Int'l Units by mouth daily. Allergies Allergen Reactions  Sulfa (Sulfonamide Antibiotics) Rash Family History Problem Relation Age of Onset  Elevated Lipids Mother  Lung Disease Father   
     pulmonary fibrosis  Elevated Lipids Maternal Aunt  Elevated Lipids Maternal Uncle  Diabetes Maternal Grandmother  Heart Disease Maternal Grandfather Social History Tobacco Use  Smoking status: Former Smoker Last attempt to quit: 1980 Years since quittin.3  Smokeless tobacco: Never Used  Tobacco comment: in her twenties Substance Use Topics  Alcohol use: Yes Alcohol/week: 0.5 oz Types: 1 Glasses of wine per week Comment: social, maybe once a week Diet, Lifestyle: No special diet Exercise level: moderately active Depression Risk Screen 3 most recent PHQ Screens 2018 Little interest or pleasure in doing things Not at all Feeling down, depressed, irritable, or hopeless Not at all Total Score PHQ 2 0 Trouble falling or staying asleep, or sleeping too much - Feeling tired or having little energy - Poor appetite, weight loss, or overeating - Feeling bad about yourself - or that you are a failure or have let yourself or your family down - Moving or speaking so slowly that other people could have noticed; or the opposite being so fidgety that others notice - Thoughts of being better off dead, or hurting yourself in some way - How difficult have these problems made it for you to do your work, take care of your home and get along with others - Alcohol Risk Screen You do not drink alcohol or very rarely. Functional Ability and Level of Safety Hearing Loss Hearing is good. Vision Screening Vision is good. No exam data present Activities of Daily Living The home contains: no safety equipment. Patient does total self care Fall Risk Screen Fall Risk Assessment, last 12 mths 2019 Able to walk? Yes Fall in past 12 months? No  
 
 
Abuse Screen Patient is not abused Screening EKG EKG order placed: Yes Patient Care Team  
Patient Care Team: 
Jf Lovelace MD as PCP - General 
 Pablo Chang MD (Obstetrics & Gynecology) End of Life Planning Advanced care planning directives were discussed with the patient and /or family/caregiver. Assessment/Plan Education and counseling provided: 
Are appropriate based on today's review and evaluation Pneumococcal Vaccine Diagnoses and all orders for this visit: 
 
1. Welcome to Medicare preventive visit -     AMB POC EKG ROUTINE W/ 12 LEADS, INTER & REP 2. Mild depression ([address] 3. Hyperlipidemia LDL goal <130 
 
4. Hypothyroidism due to acquired atrophy of thyroid Health Maintenance Due Topic Date Due  GLAUCOMA SCREENING Q2Y  12/19/2018  Bone Densitometry (Dexa) Screening  12/19/2018  Pneumococcal 65+ years (1 of 2 - PCV13) 12/19/2018  MEDICARE YEARLY EXAM  01/23/2019

## 2019-04-08 NOTE — PATIENT INSTRUCTIONS
Medicare Wellness Visit, Female The best way to live healthy is to have a lifestyle where you eat a well-balanced diet, exercise regularly, limit alcohol use, and quit all forms of tobacco/nicotine, if applicable. Regular preventive services are another way to keep healthy. Preventive services (vaccines, screening tests, monitoring & exams) can help personalize your care plan, which helps you manage your own care. Screening tests can find health problems at the earliest stages, when they are easiest to treat. [facility] follows the current, evidence-based guidelines published by the [facility] ([facility]) when recommending preventive services for our patients. Because we follow these guidelines, sometimes recommendations change over time as research supports it. (For example, mammograms used to be recommended annually. Even though Medicare will still pay for an annual mammogram, the newer guidelines recommend a mammogram every two years for women of average risk.) Of course, you and your doctor may decide to screen more often for some diseases, based on your risk and your health status. Preventive services for you include: - Medicare offers their members a free annual wellness visit, which is time for you and your primary care provider to discuss and plan for your preventive service needs. Take advantage of this benefit every year! 
-All adults over the age of 72 should receive the recommended pneumonia vaccines. Current USPSTF guidelines recommend a series of two vaccines for the best pneumonia protection.  
-All adults should have a flu vaccine yearly and a tetanus vaccine every 10 years. All adults age 61 and older should receive a shingles vaccine once in their lifetime.   
-A bone mass density test is recommended when a woman turns 65 to screen for osteoporosis. This test is only recommended one time, as a screening. Some providers will use this same test as a disease monitoring tool if you already have osteoporosis. -All adults age 38-68 who are overweight should have a diabetes screening test once every three years.  
-Other screening tests and preventive services for persons with diabetes include: an eye exam to screen for diabetic retinopathy, a kidney function test, a foot exam, and stricter control over your cholesterol.  
-Cardiovascular screening for adults with routine risk involves an electrocardiogram (ECG) at intervals determined by your doctor.  
-Colorectal cancer screenings should be done for adults age 54-65 with no increased risk factors for colorectal cancer. There are a number of acceptable methods of screening for this type of cancer. Each test has its own benefits and drawbacks. Discuss with your doctor what is most appropriate for you during your annual wellness visit. The different tests include: colonoscopy (considered the best screening method), a fecal occult blood test, a fecal DNA test, and sigmoidoscopy. -Breast cancer screenings are recommended every other year for women of normal risk, age 54-69. 
-Cervical cancer screenings for women over age 72 are only recommended with certain risk factors.  
-All adults born between [facility] should be screened once for Hepatitis C. Here is a list of your current Health Maintenance items (your personalized list of preventive services) with a due date: 
Health Maintenance Due Topic Date Due  Glaucoma Screening   12/19/2018  Bone Mineral Density   12/19/2018  Pneumococcal Vaccine (1 of 2 - PCV13) 12/19/2018 [facility] Annual Well Visit  01/23/2019

## 2019-04-09 LAB
ALBUMIN SERPL-MCNC: 4.7 G/DL (ref 3.6–4.8)
ALBUMIN/GLOB SERPL: 2.4 {RATIO} (ref 1.2–2.2)
ALP SERPL-CCNC: 75 IU/L (ref 39–117)
ALT SERPL-CCNC: 28 IU/L (ref 0–32)
AST SERPL-CCNC: 35 IU/L (ref 0–40)
BILIRUB SERPL-MCNC: 0.6 MG/DL (ref 0–1.2)
BUN SERPL-MCNC: 20 MG/DL (ref 8–27)
BUN/CREAT SERPL: 27 (ref 12–28)
CALCIUM SERPL-MCNC: 9.1 MG/DL (ref 8.7–10.3)
CHLORIDE SERPL-SCNC: 104 MMOL/L (ref 96–106)
CHOLEST SERPL-MCNC: 174 MG/DL (ref 100–199)
CO2 SERPL-SCNC: 24 MMOL/L (ref 20–29)
CREAT SERPL-MCNC: 0.74 MG/DL (ref 0.57–1)
ERYTHROCYTE [DISTWIDTH] IN BLOOD BY AUTOMATED COUNT: 13.9 % (ref 12.3–15.4)
GLOBULIN SER CALC-MCNC: 2 G/DL (ref 1.5–4.5)
GLUCOSE SERPL-MCNC: 79 MG/DL (ref 65–99)
HCT VFR BLD AUTO: 40.6 % (ref 34–46.6)
HDLC SERPL-MCNC: 74 MG/DL
HGB BLD-MCNC: 13.5 G/DL (ref 11.1–15.9)
INTERPRETATION, 910389: NORMAL
LDLC SERPL CALC-MCNC: 90 MG/DL (ref 0–99)
MCH RBC QN AUTO: 30.8 PG (ref 26.6–33)
MCHC RBC AUTO-ENTMCNC: 33.3 G/DL (ref 31.5–35.7)
MCV RBC AUTO: 93 FL (ref 79–97)
PLATELET # BLD AUTO: 243 X10E3/UL (ref 150–379)
POTASSIUM SERPL-SCNC: 4.5 MMOL/L (ref 3.5–5.2)
PROT SERPL-MCNC: 6.7 G/DL (ref 6–8.5)
RBC # BLD AUTO: 4.38 X10E6/UL (ref 3.77–5.28)
SODIUM SERPL-SCNC: 145 MMOL/L (ref 134–144)
T4 FREE SERPL-MCNC: 1.53 NG/DL (ref 0.82–1.77)
TRIGL SERPL-MCNC: 48 MG/DL (ref 0–149)
TSH SERPL DL<=0.005 MIU/L-ACNC: 0.81 UIU/ML (ref 0.45–4.5)
VLDLC SERPL CALC-MCNC: 10 MG/DL (ref 5–40)
WBC # BLD AUTO: 5.5 X10E3/UL (ref 3.4–10.8)

## 2019-04-18 ENCOUNTER — HOSPITAL ENCOUNTER (OUTPATIENT)
Dept: MAMMOGRAPHY | Age: 64
Discharge: HOME | End: 2019-04-18
Attending: INTERNAL MEDICINE
Payer: MEDICARE

## 2019-04-18 DIAGNOSIS — Z78.0 POST-MENOPAUSAL: ICD-10-CM

## 2019-04-18 PROCEDURE — 77080 DXA BONE DENSITY AXIAL: CPT

## 2019-04-22 DIAGNOSIS — E03.4 HYPOTHYROIDISM DUE TO ACQUIRED ATROPHY OF THYROID: ICD-10-CM

## 2019-04-23 RX ORDER — FLUOXETINE 10 MG/1
CAPSULE ORAL
Qty: 90 CAP | Refills: 1 | Status: SHIPPED | OUTPATIENT
Start: 2019-04-23 | End: 2019-09-25 | Stop reason: SDUPTHER

## 2019-04-23 RX ORDER — LEVOTHYROXINE SODIUM 75 UG/1
TABLET ORAL
Qty: 90 TAB | Refills: 1 | Status: SHIPPED | OUTPATIENT
Start: 2019-04-23 | End: 2019-09-26 | Stop reason: SDUPTHER

## 2019-05-03 RX ORDER — ROSUVASTATIN CALCIUM 10 MG/1
TABLET, COATED ORAL
Qty: 90 TAB | Refills: 1 | Status: SHIPPED | OUTPATIENT
Start: 2019-05-03 | End: 2019-09-18 | Stop reason: SDUPTHER

## 2019-05-06 DIAGNOSIS — Z78.9 HISTORY OF MEASLES, MUMPS, RUBELLA (MMR) VACCINATION UNKNOWN: Primary | ICD-10-CM

## 2019-05-12 LAB
MEV IGG SER IA-ACNC: >300 AU/ML
MUV IGG SER IA-ACNC: 133 AU/ML
RUBV IGG SERPL IA-ACNC: 26.2 INDEX

## 2019-05-22 DIAGNOSIS — N90.4 LICHEN SCLEROSUS ET ATROPHICUS OF THE VULVA: ICD-10-CM

## 2019-05-22 RX ORDER — BETAMETHASONE VALERATE 1.2 MG/G
CREAM TOPICAL 2 TIMES DAILY
Qty: 45 G | Refills: 3 | Status: SHIPPED | OUTPATIENT
Start: 2019-05-22 | End: 2022-05-09 | Stop reason: ALTCHOICE

## 2019-09-19 RX ORDER — ROSUVASTATIN CALCIUM 10 MG/1
TABLET, COATED ORAL
Qty: 90 TAB | Refills: 1 | Status: SHIPPED | OUTPATIENT
Start: 2019-09-19 | End: 2020-05-14

## 2019-09-25 RX ORDER — FLUOXETINE 10 MG/1
CAPSULE ORAL
Qty: 90 CAP | Refills: 1 | Status: SHIPPED | OUTPATIENT
Start: 2019-09-25 | End: 2020-05-24

## 2019-09-26 DIAGNOSIS — E03.4 HYPOTHYROIDISM DUE TO ACQUIRED ATROPHY OF THYROID: ICD-10-CM

## 2019-09-27 RX ORDER — LEVOTHYROXINE SODIUM 75 UG/1
TABLET ORAL
Qty: 90 TAB | Refills: 1 | Status: SHIPPED | OUTPATIENT
Start: 2019-09-27 | End: 2020-10-31

## 2019-10-08 ENCOUNTER — OFFICE VISIT (OUTPATIENT)
Dept: INTERNAL MEDICINE CLINIC | Age: 64
End: 2019-10-08

## 2019-10-08 VITALS
TEMPERATURE: 97.6 F | DIASTOLIC BLOOD PRESSURE: 63 MMHG | WEIGHT: 150.6 LBS | HEIGHT: 63 IN | OXYGEN SATURATION: 94 % | BODY MASS INDEX: 26.68 KG/M2 | RESPIRATION RATE: 14 BRPM | SYSTOLIC BLOOD PRESSURE: 94 MMHG | HEART RATE: 69 BPM

## 2019-10-08 DIAGNOSIS — Z23 ENCOUNTER FOR IMMUNIZATION: ICD-10-CM

## 2019-10-08 DIAGNOSIS — E78.5 HYPERLIPIDEMIA LDL GOAL <130: Primary | ICD-10-CM

## 2019-10-08 DIAGNOSIS — F32.A MILD DEPRESSION: ICD-10-CM

## 2019-10-08 DIAGNOSIS — K51.818 OTHER ULCERATIVE COLITIS WITH OTHER COMPLICATION (HCC): ICD-10-CM

## 2019-10-08 DIAGNOSIS — E03.4 HYPOTHYROIDISM DUE TO ACQUIRED ATROPHY OF THYROID: ICD-10-CM

## 2019-10-08 NOTE — PROGRESS NOTES
HISTORY OF PRESENT ILLNESS  Harpreet Hernandez is a 70 y.o. female. HPI  Hyperlipidemia:  Cardiovascular risk analysis - 70 y.o. female LDL goal is under 130. ROS: taking medications as instructed, no medication side effects noted, no TIA's, no chest pain on exertion, no dyspnea on exertion, no swelling of ankles. Tolerating meds, no myalgias or other side effects noted  New concerns: Pt's last LDL was 90 on 4/09/19. hypothyroidism. Lab Results   Component Value Date/Time    TSH 0.812 04/08/2019 10:11 AM     Thyroid ROS: denies fatigue, weight changes, heat/cold intolerance, bowel/skin changes or CVS symptoms. Mood: Stable, pt continues to comply with Prozac. She went ot [place] to sing with her [employer] choir and went on a cruise with her  afterwards to [address] [place] [address]. Confirms great mood and notes she is very happy. Her life is going \"very well\". Eye exam : Pt is UTD per exam with Dr. Elaine Mike. Ulcerative colitis: She had a colonoscopy in Jan of 2018. She f/u every 2 years. Pt endorses stable exercises. Review of Systems   All other systems reviewed and are negative. Physical Exam   Constitutional: She is oriented to person, place, and time. She appears well-developed and well-nourished. HENT:   Head: Normocephalic and atraumatic. Right Ear: External ear normal.   Left Ear: External ear normal.   Nose: Nose normal.   Mouth/Throat: Oropharynx is clear and moist.   Eyes: Pupils are equal, round, and reactive to light. Conjunctivae and EOM are normal.   Neck: Normal range of motion. Neck supple. Cardiovascular: Normal rate, regular rhythm, normal heart sounds and intact distal pulses. Pulmonary/Chest: Effort normal and breath sounds normal. Right breast exhibits no inverted nipple, no mass, no nipple discharge, no skin change and no tenderness. Left breast exhibits no inverted nipple, no mass, no nipple discharge, no skin change and no tenderness.  No breast swelling, tenderness, discharge or bleeding. Breasts are symmetrical.   Abdominal: Soft. Bowel sounds are normal.   Genitourinary: Rectum normal and vagina normal. Rectal exam shows anal tone normal and guaiac negative stool. No breast swelling, tenderness, discharge or bleeding. Musculoskeletal: Normal range of motion. Neurological: She is alert and oriented to person, place, and time. Skin: Skin is warm and dry. Psychiatric: She has a normal mood and affect. Her behavior is normal. Judgment and thought content normal.   Nursing note and vitals reviewed. ASSESSMENT and PLAN  Diagnoses and all orders for this visit:    1. Hyperlipidemia LDL goal <130  Stable, patient is tolerating medications, no myalgias. I do not recommend any change in medications. Encouraged pt to continue stable exercise and healthy diet choices. -     LIPID PANEL  -     METABOLIC PANEL, COMPREHENSIVE    2. Hypothyroidism due to acquired atrophy of thyroid  Thyroid stable. I do not recommend a change in medications.  -     TSH 3RD GENERATION  -     T4, FREE    3. Mild depression ([facility] [address]  Stable and well-managed with Prozac. No change in medications. 4. Other ulcerative colitis with other complication ([facility] [address]  Stable and well-managed with colonoscopies every 2 years. Pt will continue to f/u with specialist.     5. Encounter for immunization  Administered flu injection today in office.   -     INFLUENZA VACCINE INACTIVATED (IIV), SUBUNIT, ADJUVANTED, IM      Lab results and schedule of future lab studies reviewed with patient. Reviewed diet, exercise and weight control. Written by Nelida Shepherd, as dictated by Rosalind Jimenez MD.     Current diagnosis and concerns discussed with pt at length. Understands risks and benefits or current treatment plan and medications and accepts the treatment and medication with any possible risks. Pt asks appropriate questions which were answered.  Pt instructed to call with any concerns or problems. This note will not be viewable in [address]

## 2019-10-09 LAB
ALBUMIN SERPL-MCNC: 4.4 G/DL (ref 3.6–4.8)
ALBUMIN/GLOB SERPL: 2 {RATIO} (ref 1.2–2.2)
ALP SERPL-CCNC: 66 IU/L (ref 39–117)
ALT SERPL-CCNC: 22 IU/L (ref 0–32)
AST SERPL-CCNC: 26 IU/L (ref 0–40)
BILIRUB SERPL-MCNC: 0.9 MG/DL (ref 0–1.2)
BUN SERPL-MCNC: 21 MG/DL (ref 8–27)
BUN/CREAT SERPL: 27 (ref 12–28)
CALCIUM SERPL-MCNC: 9.7 MG/DL (ref 8.7–10.3)
CHLORIDE SERPL-SCNC: 104 MMOL/L (ref 96–106)
CHOLEST SERPL-MCNC: 158 MG/DL (ref 100–199)
CO2 SERPL-SCNC: 21 MMOL/L (ref 20–29)
CREAT SERPL-MCNC: 0.77 MG/DL (ref 0.57–1)
GLOBULIN SER CALC-MCNC: 2.2 G/DL (ref 1.5–4.5)
GLUCOSE SERPL-MCNC: 81 MG/DL (ref 65–99)
HDLC SERPL-MCNC: 72 MG/DL
INTERPRETATION, 910389: NORMAL
LDLC SERPL CALC-MCNC: 72 MG/DL (ref 0–99)
POTASSIUM SERPL-SCNC: 4.6 MMOL/L (ref 3.5–5.2)
PROT SERPL-MCNC: 6.6 G/DL (ref 6–8.5)
SODIUM SERPL-SCNC: 144 MMOL/L (ref 134–144)
T4 FREE SERPL-MCNC: 1.84 NG/DL (ref 0.82–1.77)
TRIGL SERPL-MCNC: 69 MG/DL (ref 0–149)
TSH SERPL DL<=0.005 MIU/L-ACNC: 0.76 UIU/ML (ref 0.45–4.5)
VLDLC SERPL CALC-MCNC: 14 MG/DL (ref 5–40)

## 2019-11-21 ENCOUNTER — HOSPITAL ENCOUNTER (OUTPATIENT)
Dept: MAMMOGRAPHY | Age: 64
Discharge: HOME | End: 2019-11-21
Attending: INTERNAL MEDICINE
Payer: MEDICARE

## 2019-11-21 DIAGNOSIS — Z12.31 SCREENING MAMMOGRAM FOR HIGH-RISK PATIENT: ICD-10-CM

## 2019-11-21 PROCEDURE — 77067 SCR MAMMO BI INCL CAD: CPT

## 2020-01-24 ENCOUNTER — TELEPHONE (OUTPATIENT)
Dept: INTERNAL MEDICINE CLINIC | Age: 65
End: 2020-01-24

## 2020-01-24 NOTE — TELEPHONE ENCOUNTER
----- Message from Justice Buitrago sent at 1/24/2020  3:13 PM EST -----  Regarding: Dr. Dann Elizondo first and last name:  Luis M Escalante    Reason for call:  Prior authorization for RX Apriso 0.375 gram capsule    Callback required yes/no and why:  Yes    Best contact number(s):  [phone] Ref #[id number]    Details to clarify the request:      Justice Buitrago

## 2020-01-27 NOTE — TELEPHONE ENCOUNTER
Forms received via fax. However, per Dr. Manish Temple we cannot complete PA. This needs to be done by patient's GI doctor.

## 2020-04-16 ENCOUNTER — VIRTUAL VISIT (OUTPATIENT)
Dept: INTERNAL MEDICINE CLINIC | Age: 65
End: 2020-04-16

## 2020-04-16 VITALS — HEART RATE: 79 BPM | TEMPERATURE: 98 F | DIASTOLIC BLOOD PRESSURE: 59 MMHG | SYSTOLIC BLOOD PRESSURE: 120 MMHG

## 2020-04-16 DIAGNOSIS — E03.4 HYPOTHYROIDISM DUE TO ACQUIRED ATROPHY OF THYROID: ICD-10-CM

## 2020-04-16 DIAGNOSIS — F32.A MILD DEPRESSION: ICD-10-CM

## 2020-04-16 DIAGNOSIS — K51.818 OTHER ULCERATIVE COLITIS WITH OTHER COMPLICATION (HCC): ICD-10-CM

## 2020-04-16 DIAGNOSIS — E78.5 HYPERLIPIDEMIA LDL GOAL <130: ICD-10-CM

## 2020-04-16 DIAGNOSIS — Z00.00 MEDICARE ANNUAL WELLNESS VISIT, SUBSEQUENT: Primary | ICD-10-CM

## 2020-04-16 RX ORDER — BALSALAZIDE DISODIUM 750 MG/1
CAPSULE ORAL
COMMUNITY
Start: 2020-04-04

## 2020-04-16 NOTE — PROGRESS NOTES
HISTORY OF PRESENT ILLNESS  Amanda Stover is a 75 y.o. female who is present, aware, and consenting for real-time synchronous virtual video visit through [employer]. HPI  Hyperlipidemia:  Cardiovascular risk analysis - 75 y.o. female LDL goal is under 130. ROS: taking medications as instructed, no medication side effects noted, no TIA's, no chest pain on exertion, no dyspnea on exertion, no swelling of ankles. Tolerating meds, no myalgias or other side effects noted. New concerns: Pt's last LDL was 72 on 10/08/19. Continues on Crestor 10 mg. Pt reports that she has been doing [employer] 3x a week and walking around her neighborhood daily. hypothyroidism. Lab Results   Component Value Date/Time    TSH 0.762 10/08/2019 09:42 AM     Thyroid ROS: denies fatigue, weight changes, heat/cold intolerance, bowel/skin changes or CVS symptoms. Continues on Synthroid 75 mcg. Mood: Stable, pt continues to comply with Prozac 10 mg. Pt reports that she is doing well mentally and emotionally despite the pandemic. Ulcerative colitis: Per chart review, pt had a colonoscopy on 2/24/20 with Dr. Marya Miles at [facility]. All 8 biopsies were normal. Pt will f/u in 2 years. Pt denies HA, loss of appetite, fatigue, CP, SOB, abnormal BM's, or increased allergy sx. Review of Systems   Constitutional: Negative for malaise/fatigue. Respiratory: Negative for shortness of breath. Cardiovascular: Negative for chest pain. Gastrointestinal: Negative for abdominal pain, blood in stool, constipation, diarrhea, heartburn, melena, nausea and vomiting. Neurological: Negative for headaches. All other systems reviewed and are negative. Physical Exam  Vitals signs reviewed. Constitutional:       General: She is not in acute distress. Appearance: Normal appearance. She is not ill-appearing, toxic-appearing or diaphoretic.    HENT:      Right Ear: Hearing normal.      Left Ear: Hearing normal.      Nose: Nose normal.      Mouth/Throat:      Mouth: Mucous membranes are moist.      Pharynx: Oropharynx is clear. Eyes:      Conjunctiva/sclera: Conjunctivae normal.   Neck:      Musculoskeletal: Normal range of motion. Pulmonary:      Effort: No respiratory distress. Breath sounds: Normal air entry. Musculoskeletal: Normal range of motion. Skin:     General: Skin is warm and dry. Neurological:      General: No focal deficit present. Mental Status: She is alert and oriented to person, place, and time. Mental status is at baseline. Psychiatric:         Mood and Affect: Mood normal.         Behavior: Behavior normal.         Thought Content: Thought content normal.         Judgment: Judgment normal.         ASSESSMENT and PLAN  Diagnoses and all orders for this visit:    1. Medicare annual wellness visit, subsequent    2. Other ulcerative colitis with other complication ([address]  Stable and well-managed with recent colonoscopy at 2/24/20 with Dr. Madiha Childers and non-path biopsies. No change in medications. 3. Mild depression ([address]  Stable and well-managed with Prozac 10 mg. No change in medications. 4. Hypothyroidism due to acquired atrophy of thyroid  Thyroid presumed stable with Synthroid 75 mcg. Rechecking labs soon to confirm. Pt tolerating medication. I do not recommend a change in treatment plan. 5. Hyperlipidemia LDL goal <130  Presumed stable with Crestor 10 mg, will recheck labs soon. Patient is tolerating medications with no myalgias. I do not recommend any change in treatment plan. Additional comments: Pt will come in at some point over the next month to get labs done. Advised pt to go into her pharmacy and get the second pneumonia vaccine. Reminded pt that she is due for her mammogram in the fall. Advised pt to f/u in person in October. Lab results and schedule of future lab studies reviewed with patient. Reviewed diet, exercise and weight control.     Written by Patricia Tidwell Alejandro, as dictated by Deisy Levy MD.     Current diagnosis and concerns discussed with pt at length. Understands risks and benefits or current treatment plan and medications and accepts the treatment and medication with any possible risks. Pt asks appropriate questions which were answered. Pt instructed to call with any concerns or problems.

## 2020-04-16 NOTE — PATIENT INSTRUCTIONS
Medicare Wellness Visit, Female The best way to live healthy is to have a lifestyle where you eat a well-balanced diet, exercise regularly, limit alcohol use, and quit all forms of tobacco/nicotine, if applicable. Regular preventive services are another way to keep healthy. Preventive services (vaccines, screening tests, monitoring & exams) can help personalize your care plan, which helps you manage your own care. Screening tests can find health problems at the earliest stages, when they are easiest to treat. [facility] follows the current, evidence-based guidelines published by the [facility] ([facility]) when recommending preventive services for our patients. Because we follow these guidelines, sometimes recommendations change over time as research supports it. (For example, mammograms used to be recommended annually. Even though Medicare will still pay for an annual mammogram, the newer guidelines recommend a mammogram every two years for women of average risk). Of course, you and your doctor may decide to screen more often for some diseases, based on your risk and your co-morbidities (chronic disease you are already diagnosed with). Preventive services for you include: - Medicare offers their members a free annual wellness visit, which is time for you and your primary care provider to discuss and plan for your preventive service needs. Take advantage of this benefit every year! 
-All adults over the age of 72 should receive the recommended pneumonia vaccines. Current USPSTF guidelines recommend a series of two vaccines for the best pneumonia protection.  
-All adults should have a flu vaccine yearly and a tetanus vaccine every 10 years.  
-All adults age 48 and older should receive the shingles vaccines (series of two vaccines). -All adults age 38-68 who are overweight should have a diabetes screening test once every three years. -All adults born between [birth year] and 1965 should be screened once for Hepatitis C. 
-Other screening tests and preventive services for persons with diabetes include: an eye exam to screen for diabetic retinopathy, a kidney function test, a foot exam, and stricter control over your cholesterol.  
-Cardiovascular screening for adults with routine risk involves an electrocardiogram (ECG) at intervals determined by your doctor.  
-Colorectal cancer screenings should be done for adults age 54-65 with no increased risk factors for colorectal cancer. There are a number of acceptable methods of screening for this type of cancer. Each test has its own benefits and drawbacks. Discuss with your doctor what is most appropriate for you during your annual wellness visit. The different tests include: colonoscopy (considered the best screening method), a fecal occult blood test, a fecal DNA test, and sigmoidoscopy. 
 
-A bone mass density test is recommended when a woman turns 65 to screen for osteoporosis. This test is only recommended one time, as a screening. Some providers will use this same test as a disease monitoring tool if you already have osteoporosis. -Breast cancer screenings are recommended every other year for women of normal risk, age 54-69. 
-Cervical cancer screenings for women over age 72 are only recommended with certain risk factors. Here is a list of your current Health Maintenance items (your personalized list of preventive services) with a due date: 
Health Maintenance Due Topic Date Due  Pneumococcal Vaccine (2 of 2 - PPSV23) 04/09/2020 Erika Parra Annual Well Visit  04/08/2020

## 2020-04-16 NOTE — PROGRESS NOTES
This is the Subsequent Medicare Annual Wellness Exam, performed 12 months or more after the Initial AWV or the last Subsequent AWV    Consent: Tory Cooklove, who was seen by synchronous (real-time) audio-video technology, and/or her healthcare decision maker, is aware that this patient-initiated, Telehealth encounter on 4/16/2020 is a billable service. While AWVs are fully covered by Medicare, any services rendered on this date that are not included in an AWV are subject to additional billing, with coverage as determined by her insurance carrier. She is aware that she may receive a bill for any such additional services and has provided verbal consent to proceed: Yes. I have reviewed the patient's medical history in detail and updated the computerized patient record.      History     Patient Active Problem List   Diagnosis Code    Hypothyroidism E03.9    Hyperlipidemia LDL goal <130 E78.5    Allergic rhinitis, cause unspecified J30.9    Osteopenia M85.80    Ulcerative colitis ([address] Q91.70    Lichen sclerosus et atrophicus of the vulva N90.4    Mild depression ([address] F32.0     Past Medical History:   Diagnosis Date    Hypercholesteremia     MD (muscular dystrophy) ([address]     Parkinson disease ([address]     Seasonal allergies     Skin benign neoplasm 2005    lipoma removed from upper right thigh    Thyroid disease     Ulcerative colitis ([address] 1973      Past Surgical History:   Procedure Laterality Date    ENDOSCOPY, COLON, DIAGNOSTIC      1/13  normal- h/o UC     HX APPENDECTOMY      HX HEENT  02/2014    periodontal graft    HX OTHER SURGICAL Right 2005    lipoma    HX PACEMAKER PLACEMENT Left      Current Outpatient Medications   Medication Sig Dispense Refill    balsalazide (COLAZAL) 750 mg capsule TAKE 3 CAPSULES BY MOUTH 3 TIMES A DAY      levothyroxine (SYNTHROID) 75 mcg tablet TAKE 1 TABLET DAILY BEFORE BREAKFAST 90 Tab 1    FLUoxetine (PROZAC) 10 mg capsule TAKE 1 CAPSULE EVERY DAY 90 Cap 1    rosuvastatin (CRESTOR) 10 mg tablet TAKE 1 TABLET EVERY DAY 90 Tab 1    betamethasone valerate (VALISONE) 0.1 % topical cream Apply  to affected area two (2) times a day. 45 g 3    ERGOCALCIFEROL (VITAMIN D PO) Take 1,000 Int'l Units by mouth daily.  mesalamine ER (APRISO) 0.375 gram 24 hour capsule Take 4 Caps by mouth daily. 360 Cap 3    mesalamine ER (APRISO) 0.375 gram capsule Take 1.5 g by mouth daily. Allergies   Allergen Reactions    Sulfa (Sulfonamide Antibiotics) Rash       Family History   Problem Relation Age of Onset    Elevated Lipids Mother     Lung Disease Father         pulmonary fibrosis    Elevated Lipids Maternal Aunt     Elevated Lipids Maternal Uncle     Diabetes Maternal Grandmother     Heart Disease Maternal Grandfather      Social History     Tobacco Use    Smoking status: Former Smoker     Packs/day: 0.25     Years: 2.00     Pack years: 0.50     Last attempt to quit: 1980     Years since quittin.3    Smokeless tobacco: Never Used    Tobacco comment: in her twenties   Substance Use Topics    Alcohol use:  Yes     Alcohol/week: 0.8 standard drinks     Types: 1 Glasses of wine per week     Comment: social, maybe once a week       Depression Risk Factor Screening:     3 most recent PHQ Screens 2020   PHQ Not Done Active Diagnosis of Depression or Bipolar Disorder   Little interest or pleasure in doing things -   Feeling down, depressed, irritable, or hopeless -   Total Score PHQ 2 -   Trouble falling or staying asleep, or sleeping too much -   Feeling tired or having little energy -   Poor appetite, weight loss, or overeating -   Feeling bad about yourself - or that you are a failure or have let yourself or your family down -   Moving or speaking so slowly that other people could have noticed; or the opposite being so fidgety that others notice -   Thoughts of being better off dead, or hurting yourself in some way -   How difficult have these problems made it for you to do your work, take care of your home and get along with others -       Alcohol Risk Factor Screening:   Do you average 1 drink per night or more than 7 drinks a week:  No    On any one occasion in the past three months have you have had more than 3 drinks containing alcohol:  No      Functional Ability and Level of Safety:   Hearing: Hearing is good. Activities of Daily Living: The home contains: no safety equipment. Patient does total self care    Ambulation: with no difficulty    Fall Risk:  Fall Risk Assessment, last 12 mths 4/16/2020   Able to walk? Yes   Fall in past 12 months? No       Abuse Screen:  Patient is not abused    Cognitive Screening   Has your family/caregiver stated any concerns about your memory: no  Cognitive Screening: Normal - MMSE (Mini Mental Status Exam)    Patient Care Team   Patient Care Team:  Jason Davies MD as PCP - [facility]  Belen Larios MD as PCP - [facility] Adolfo Ledezma Provider  Smita Mojica MD (Obstetrics & Gynecology)    Assessment/Plan   Education and counseling provided:  Are appropriate based on today's review and evaluation    Diagnoses and all orders for this visit:    1. Medicare annual wellness visit, subsequent    2. Other ulcerative colitis with other complication ([facility] [address]    3. Mild depression ([facility] [address]    4. Hypothyroidism due to acquired atrophy of thyroid    5. Hyperlipidemia LDL goal <130        Health Maintenance Due   Topic Date Due    Pneumococcal 65+ years (2 of 2 - PPSV23) 04/09/2020    Medicare Yearly Exam  04/08/2020       Odalis Henderson is a 75 y.o. female being evaluated by a video visit encounter for concerns as above. A caregiver was present when appropriate. Due to this being a TeleHealth encounter (During ETNZE-18 public health emergency), evaluation of the following organ systems was limited: Vitals/Constitutional/EENT/Resp/CV/GI//MS/Neuro/Skin/Heme-Lymph-Imm.   Pursuant to the emergency declaration under the [address] [place] [address] [facility] authority and the [employer] and [employer] Act, this Virtual  Visit was conducted, with patient's (and/or legal guardian's) consent, to reduce the patient's risk of exposure to COVID-19 and provide necessary medical care. Services were provided through a video synchronous discussion virtually to substitute for in-person clinic visit. Patient and provider were located at their individual homes.     Elif Walker MD

## 2020-04-20 ENCOUNTER — HOSPITAL ENCOUNTER (OUTPATIENT)
Dept: LAB | Age: 65
Discharge: HOME | End: 2020-04-20

## 2020-04-20 DIAGNOSIS — K51.818 OTHER ULCERATIVE COLITIS WITH OTHER COMPLICATION (HCC): ICD-10-CM

## 2020-04-20 DIAGNOSIS — E78.5 HYPERLIPIDEMIA LDL GOAL <130: ICD-10-CM

## 2020-04-20 DIAGNOSIS — E03.4 HYPOTHYROIDISM DUE TO ACQUIRED ATROPHY OF THYROID: ICD-10-CM

## 2020-04-20 LAB
ALBUMIN SERPL-MCNC: 4.3 G/DL (ref 3.5–5)
ALBUMIN/GLOB SERPL: 1.4 {RATIO} (ref 1.1–2.2)
ALP SERPL-CCNC: 81 U/L (ref 45–117)
ALT SERPL-CCNC: 30 U/L (ref 12–78)
ANION GAP SERPL CALC-SCNC: 10 MMOL/L (ref 5–15)
AST SERPL-CCNC: 35 U/L (ref 15–37)
BILIRUB SERPL-MCNC: 0.9 MG/DL (ref 0.2–1)
BUN SERPL-MCNC: 19 MG/DL (ref 6–20)
BUN/CREAT SERPL: 26 (ref 12–20)
CALCIUM SERPL-MCNC: 9.6 MG/DL (ref 8.5–10.1)
CHLORIDE SERPL-SCNC: 102 MMOL/L (ref 97–108)
CHOLEST SERPL-MCNC: 202 MG/DL
CO2 SERPL-SCNC: 29 MMOL/L (ref 21–32)
CREAT SERPL-MCNC: 0.73 MG/DL (ref 0.55–1.02)
ERYTHROCYTE [DISTWIDTH] IN BLOOD BY AUTOMATED COUNT: 12.9 % (ref 11.5–14.5)
GLOBULIN SER CALC-MCNC: 3.1 G/DL (ref 2–4)
GLUCOSE SERPL-MCNC: 85 MG/DL (ref 65–100)
HCT VFR BLD AUTO: 43.4 % (ref 35–47)
HDLC SERPL-MCNC: 89 MG/DL
HDLC SERPL: 2.3 {RATIO} (ref 0–5)
HGB BLD-MCNC: 13.8 G/DL (ref 11.5–16)
LDLC SERPL CALC-MCNC: 95.8 MG/DL (ref 0–100)
LIPID PROFILE,FLP: ABNORMAL
MCH RBC QN AUTO: 30.4 PG (ref 26–34)
MCHC RBC AUTO-ENTMCNC: 31.8 G/DL (ref 30–36.5)
MCV RBC AUTO: 95.6 FL (ref 80–99)
NRBC # BLD: 0 K/UL (ref 0–0.01)
NRBC BLD-RTO: 0 PER 100 WBC
PLATELET # BLD AUTO: 258 K/UL (ref 150–400)
PMV BLD AUTO: 10.6 FL (ref 8.9–12.9)
POTASSIUM SERPL-SCNC: 4.3 MMOL/L (ref 3.5–5.1)
PROT SERPL-MCNC: 7.4 G/DL (ref 6.4–8.2)
RBC # BLD AUTO: 4.54 M/UL (ref 3.8–5.2)
SODIUM SERPL-SCNC: 141 MMOL/L (ref 136–145)
T4 FREE SERPL-MCNC: 1.3 NG/DL (ref 0.8–1.5)
TRIGL SERPL-MCNC: 86 MG/DL (ref ?–150)
TSH SERPL DL<=0.05 MIU/L-ACNC: 1.79 UIU/ML (ref 0.36–3.74)
VLDLC SERPL CALC-MCNC: 17.2 MG/DL
WBC # BLD AUTO: 6.6 K/UL (ref 3.6–11)

## 2020-05-14 RX ORDER — ROSUVASTATIN CALCIUM 10 MG/1
TABLET, COATED ORAL
Qty: 90 TAB | Refills: 1 | Status: SHIPPED | OUTPATIENT
Start: 2020-05-14 | End: 2020-06-08

## 2020-05-24 RX ORDER — FLUOXETINE 10 MG/1
CAPSULE ORAL
Qty: 90 CAP | Refills: 1 | Status: SHIPPED | OUTPATIENT
Start: 2020-05-24 | End: 2020-10-25

## 2020-06-03 NOTE — PERIOP NOTES
PAT: Call to surgeon office re: Kesha Easton is \"Gastrocnemius Resection\". T]  The Consent Order reads \"Gastricnemius Recession\". Unable to leave message/voicemail drops call. Call to [facility] OR Coordinator: Ousmane Marcano RN. Left Voicemail re: above.

## 2020-06-08 ENCOUNTER — HOSPITAL ENCOUNTER (OUTPATIENT)
Dept: PREADMISSION TESTING | Age: 65
Discharge: HOME | End: 2020-06-08

## 2020-06-08 ENCOUNTER — HOSPITAL ENCOUNTER (OUTPATIENT)
Dept: PREADMISSION TESTING | Age: 65
Discharge: HOME | End: 2020-06-08
Payer: MEDICARE

## 2020-06-08 VITALS
HEIGHT: 63 IN | RESPIRATION RATE: 16 BRPM | SYSTOLIC BLOOD PRESSURE: 116 MMHG | TEMPERATURE: 98.5 F | WEIGHT: 158 LBS | OXYGEN SATURATION: 95 % | HEART RATE: 69 BPM | DIASTOLIC BLOOD PRESSURE: 74 MMHG | BODY MASS INDEX: 28 KG/M2

## 2020-06-08 PROCEDURE — 87635 SARS-COV-2 COVID-19 AMP PRB: CPT

## 2020-06-08 RX ORDER — ROSUVASTATIN CALCIUM 10 MG/1
10 TABLET, COATED ORAL
COMMUNITY
End: 2020-10-15

## 2020-06-08 NOTE — PERIOP NOTES
[address] [address] [facility]   MAIN OR                                  [phone]   MAIN PRE OP                          [phone]                                                                                AMBULATORY PRE OP          [phone]  PRE-ADMISSION TESTING    [phone]     Surgery Date: Friday June 12th       Is surgery arrival time given by surgeon? YES  NO  If NO, [facility] staff will call you between 3 and 7pm the day before your surgery with your arrival time. (If your surgery is on a Monday, we will call you the Friday before.)    Call [phone] after 7pm Monday-Friday if you did not receive this call. INSTRUCTIONS BEFORE YOUR SURGERY   When You  Arrive Arrive at the [address] [facility] on the day of your surgery  Have your insurance card, photo ID, and any copayment (if needed)   Food   and   Drink NO food or drink after midnight the night before surgery    This means NO water, gum, mints, coffee, juice, etc.  No alcohol (beer, wine, liquor) 24 hours before and after surgery   Medications to   TAKE   Morning of Surgery MEDICATIONS TO TAKE THE MORNING OF SURGERY WITH A SIP OF WATER:    None   Medications  To  STOP      7 days before surgery  Non-Steroidal anti-inflammatory Drugs (NSAID's): for example, Ibuprofen (Advil, Motrin), Naproxen (Aleve)   Aspirin, if taking for pain    Herbal supplements, vitamins, and fish oil   Other:  (Pain medications not listed above, including Tylenol may be taken)        Bathing Clothing  Jewelry  Valuables      If you shower the morning of surgery, please do not apply anything to your skin (lotions, powders, deodorant, or makeup, especially mascara)   Follow Chlorhexidine [employer] body wash instructions provided to you during PAT appointment. Begin 3 days prior to surgery.    Do not shave or trim anywhere 24 hours before surgery   Wear your hair loose or down; no pony-tails, buns, or metal hair clips   Wear loose, comfortable, clean clothes   Wear glasses instead of contacts   Leave money, valuables, and jewelry, including body piercings, at home   Going Home - or Spending the Night  SAME-DAY SURGERY: You must have a responsible adult drive you home and stay with you 24 hours after surgery     Special Instructions *Covid Precautions     Follow all instructions so your surgery wont be cancelled. Please, be on time. If a situation occurs and you are delayed the day of surgery, call [phone] or [phone]. If your physical condition changes (like a fever, cold, flu, etc.) call your surgeon. Home medication(s) reviewed and verified via   LIST   VERBAL   during PAT appointment. The patient was contacted by      IN-PERSON  The patient verbalizes understanding of all instructions and    DOES NOT   need reinforcement.

## 2020-06-08 NOTE — H&P (VIEW-ONLY)
Preoperative Evaluation History and Physical with Surgical Risk Stratification 6/8/2020 CC: Right bunion Surgery: Bunionectomy HPI:  
Asiya Watts is a 75 y.o. female referred for pre-operative evaluation by Dr. Adryan Cobb for surgery on 6/12/20. Mrs. Marce James states she has had her bunion for many years. She notes she has pain with tight shoes. Her toes are being pushed together. The patient was evaluated in the surgeon's office and it was determined that the most appropriate plan of care is to proceed with surgical intervention. Patient's PCP Margarita Freeman MD 
 
Review of Systems Constitutional: Negative for chills and fever HENT: Negative for congestion and sore throat Eyes: negative for blurred vision and double vision Respiratory: Negative for cough, shortness of breath and wheezing Mouth: Negative for loose, broken or chipped teeth. Cardiovascular: Negative for chest pain and palpitations Gastrointestinal: Negative for abdominal pain, constipation, diarrhea and nausea Genitourinary: Negative for dysuria and hematuria Musculoskeletal: Negative for joint pain. Bunion Skin: Negative for rash, open wounds. Negative for bruises easily Neurological: Negative for dizziness, tremors and headaches Psychiatric: Negative for depression. The patient is not nervous/anxious. Inherent Risk of Surgery Surgical risk:  Low Low:  EIntermediate:   High:   
Patient Cardiac Risk Assessment Revised Cardiac Risk Index (RCRI) Rate if cardiac death, nonfatal MI, nonfatal cardiac arrest by number of risk factor- 0.4% KATE/AHA 2007 Guidelines:  
1) Surgery Emergency, Non-cardiac -> to surgery 2) If not, look at clinical predictors Major Intermediate Minor Blood Thinner: NA 
 
METS  
 
 EQUAL TO 4 Care for self Walk indoors around house Walk 2-3 blocks on level ground (2-3 mph) Light work around house (dust, dishes) Other Risk Factors:  
Screening for ETOH use:  Done and low risk Smoking status:  Former Personal or FH of bleeding problems:  No 
Personal or FH of blood clots:  No 
Personal or FH of anesthesia problems:   No 
 
Pulmonary Risk: 
Asthma or COPD:  No 
Body mass index is 27.99 kg/m². Known BRANDON:  No 
 
Past Medical, Surgical, Social History Allergies: Allergies Allergen Reactions  Sulfa (Sulfonamide Antibiotics) Rash and Nausea Only Medication Documentation Review Audit Reviewed by Tristen Bergman RN (Registered Nurse) on 20 at 8105 Medication Sig Documenting Provider Last Dose Status Taking?  
balsalazide (COLAZAL) 750 mg capsule TAKE 3 CAPSULES BY MOUTH 3 TIMES A DAY Provider, Historical  Active Yes  
betamethasone valerate (VALISONE) 0.1 % topical cream Apply  to affected area two (2) times a day. Manish Moore MD  Active Yes ERGOCALCIFEROL (VITAMIN D PO) Take 1,000 Int'l Units by mouth daily. Provider, Historical  Active Yes FLUoxetine (PROzac) 10 mg capsule TAKE 1 CAPSULE EVERY DAY Lisandro Larios MD  Active Yes  
levothyroxine (SYNTHROID) 75 mcg tablet TAKE 1 TABLET DAILY BEFORE BREAKFAST Lisandro Larios MD  Active Yes  
rosuvastatin (CRESTOR) 10 mg tablet Take 10 mg by mouth. Provider, Historical  Active Yes Past Medical History:  
Diagnosis Date  Actinic keratosis  Hypercholesteremia  Hypothyroidism  Mild depression ([facility] [address]  Seasonal allergies  Ulcerative colitis ([facility] [address]  Past Surgical History:  
Procedure Laterality Date  ENDOSCOPY, COLON, DIAGNOSTIC    
   normal- h/o UC   
 HX APPENDECTOMY  HX HEENT  2014  
 periodontal graft  HX LIPOMA RESECTION Right  Social History Tobacco Use  Smoking status: Former Smoker Packs/day: 0.25 Years: 2.00 Pack years: 0.50 Types: Cigarettes Last attempt to quit: 1980 Years since quittin.5  Smokeless tobacco: Never Used Substance Use Topics  Alcohol use: Yes Alcohol/week: 4.0 standard drinks Types: 4 Glasses of wine per week  Drug use: No  
 
Family History Problem Relation Age of Onset  Elevated Lipids Mother  Lung Disease Father   
     pulmonary fibrosis  Elevated Lipids Maternal Aunt  Elevated Lipids Maternal Uncle  Diabetes Maternal Grandmother  Heart Disease Maternal Grandfather  Deep Vein Thrombosis Neg Hx  Anesth Problems Neg Hx Objective Vitals:  
 06/08/20 1007 BP: 116/74 Pulse: 69 Resp: 16 Temp: 98.5 °F (36.9 °C) SpO2: 95% Weight: 71.7 kg (158 lb) Height: 5' 3\" (1.6 m) Constitutional:  Appears well,  No Acute Distress, Vitals noted Psychiatric:   Affect normal, Alert and Oriented to person/place/time Eyes:   Pupils equally round and reactive, EOMI, conjunctiva clear, eyelids normal 
ENT:   External ears and nose normal, teeth normal, gums normal, TMs and Orophyarynx normal 
Neck:   General inspection and Thyroid normal.  No abnormal cervical or supraclavicular nodes Lungs:   Clear to auscultation, good respiratory effort Heart: Ausculation normal.  Regular rhythm. No cardiac murmurs. No carotid bruits or palpable thrills Chest wall normal 
Musculoskeletal: Bunion to right foot Extremities:   Without edema, good peripheral pulses Skin:   Warm to palpation, without rashes, bruising, or suspicious lesions No results found for this or any previous visit (from the past 72 hour(s)). Assessment and Plan Assessment/Plan:  
1) Bunion 2) Pre-Operative Evaluation Preoperative Clearance Per RCRI, the patient has a 0.4% risk of cardiac death, nonfatal MI, nonfatal cardiac arrest based on no risk factors. Per ACC/AHA guidelines, patient is low risk for a(n) low risk surgery and may proceed to planned surgery with the above noted risk.  
 
Jhoan Stovall, EAMON

## 2020-06-08 NOTE — H&P
Preoperative Evaluation                     History and Physical with Surgical Risk Stratification     6/8/2020    CC: Right bunion  Surgery: Bunionectomy     HPI:   Blanca Ocampo is a 75 y.o. female referred for pre-operative evaluation by Dr. Ollie Barry for surgery on 6/12/20. Mrs. Luana Faust states she has had her bunion for many years. She notes she has pain with tight shoes. Her toes are being pushed together. The patient was evaluated in the surgeon's office and it was determined that the most appropriate plan of care is to proceed with surgical intervention. Patient's PCP Candi Larios MD    Review of Systems     Constitutional: Negative for chills and fever  HENT: Negative for congestion and sore throat  Eyes: negative for blurred vision and double vision  Respiratory: Negative for cough, shortness of breath and wheezing  Mouth: Negative for loose, broken or chipped teeth. Cardiovascular: Negative for chest pain and palpitations  Gastrointestinal: Negative for abdominal pain, constipation, diarrhea and nausea  Genitourinary: Negative for dysuria and hematuria  Musculoskeletal: Negative for joint pain. Bunion  Skin: Negative for rash, open wounds. Negative for bruises easily  Neurological: Negative for dizziness, tremors and headaches  Psychiatric: Negative for depression. The patient is not nervous/anxious.     Inherent Risk of Surgery     Surgical risk:  Low  Low:  EIntermediate:   High:    Patient Cardiac Risk Assessment     Revised Cardiac Risk Index (RCRI)    Rate if cardiac death, nonfatal MI, nonfatal cardiac arrest by number of risk factor- 0.4%    KATE/AHA 2007 Guidelines:   1) Surgery Emergency, Non-cardiac -> to surgery  2) If not, look at clinical predictors    Major Intermediate Minor       Blood Thinner: NA    METS      EQUAL TO 4 Care for self Walk indoors around house Walk 2-3 blocks on level ground (2-3 mph) Light work around house (dust, dishes)     Other Risk Factors:   Screening for ETOH use:  Done and low risk  Smoking status:  Former    Personal or FH of bleeding problems:  No  Personal or FH of blood clots:  No  Personal or FH of anesthesia problems:   No    Pulmonary Risk:  Asthma or COPD:  No  Body mass index is 27.99 kg/m². Known BRANDON:  No    Past Medical, Surgical, Social History     Allergies: Allergies   Allergen Reactions    Sulfa (Sulfonamide Antibiotics) Rash and Nausea Only       Medication Documentation Review Audit     Reviewed by Juan Castano RN (Registered Nurse) on 20 at 1012    Medication Sig Documenting Provider Last Dose Status Taking?   balsalazide (COLAZAL) 750 mg capsule TAKE 3 CAPSULES BY MOUTH 3 TIMES A DAY Provider, Historical  Active Yes   betamethasone valerate (VALISONE) 0.1 % topical cream Apply  to affected area two (2) times a day. Mendez Carl MD  Active Yes   ERGOCALCIFEROL (VITAMIN D PO) Take 1,000 Int'l Units by mouth daily. Provider, Historical  Active Yes   FLUoxetine (PROzac) 10 mg capsule TAKE 1 CAPSULE EVERY DAY Dangelo Larios MD  Active Yes   levothyroxine (SYNTHROID) 75 mcg tablet TAKE 1 TABLET DAILY BEFORE BREAKFAST Dangelo Larios MD  Active Yes   rosuvastatin (CRESTOR) 10 mg tablet Take 10 mg by mouth.  Provider, Historical  Active Yes                Past Medical History:   Diagnosis Date    Actinic keratosis     Hypercholesteremia     Hypothyroidism     Mild depression ([facility] [address]     Seasonal allergies     Ulcerative colitis ([facility] [address]      Past Surgical History:   Procedure Laterality Date    ENDOSCOPY, COLON, DIAGNOSTIC        normal- h/o UC     HX APPENDECTOMY      HX HEENT  2014    periodontal graft    HX LIPOMA RESECTION Right      Social History     Tobacco Use    Smoking status: Former Smoker     Packs/day: 0.25     Years: 2.00     Pack years: 0.50     Types: Cigarettes     Last attempt to quit: 1980     Years since quittin.5    Smokeless tobacco: Never Used   Substance Use Topics    Alcohol use: Yes     Alcohol/week: 4.0 standard drinks     Types: 4 Glasses of wine per week    Drug use: No     Family History   Problem Relation Age of Onset    Elevated Lipids Mother     Lung Disease Father         pulmonary fibrosis    Elevated Lipids Maternal Aunt     Elevated Lipids Maternal Uncle     Diabetes Maternal Grandmother     Heart Disease Maternal Grandfather     Deep Vein Thrombosis Neg Hx     Anesth Problems Neg Hx        Objective     Vitals:    06/08/20 1007   BP: 116/74   Pulse: 69   Resp: 16   Temp: 98.5 °F (36.9 °C)   SpO2: 95%   Weight: 71.7 kg (158 lb)   Height: 5' 3\" (1.6 m)       Constitutional:  Appears well,  No Acute Distress, Vitals noted  Psychiatric:   Affect normal, Alert and Oriented to person/place/time    Eyes:   Pupils equally round and reactive, EOMI, conjunctiva clear, eyelids normal  ENT:   External ears and nose normal, teeth normal, gums normal, TMs and Orophyarynx normal  Neck:   General inspection and Thyroid normal.  No abnormal cervical or supraclavicular nodes    Lungs:   Clear to auscultation, good respiratory effort  Heart: Ausculation normal.  Regular rhythm. No cardiac murmurs. No carotid bruits or palpable thrills  Chest wall normal  Musculoskeletal: Bunion to right foot  Extremities:   Without edema, good peripheral pulses  Skin:   Warm to palpation, without rashes, bruising, or suspicious lesions     No results found for this or any previous visit (from the past 72 hour(s)). Assessment and Plan     Assessment/Plan:   1) Bunion  2) Pre-Operative Evaluation    Preoperative Clearance  Per RCRI, the patient has a 0.4% risk of cardiac death, nonfatal MI, nonfatal cardiac arrest based on no risk factors. Per ACC/AHA guidelines, patient is low risk for a(n) low risk surgery and may proceed to planned surgery with the above noted risk.     Bob Davidson NP

## 2020-06-09 LAB — SARS-COV-2, COV2NT: NOT DETECTED

## 2020-06-11 ENCOUNTER — ANESTHESIA EVENT (OUTPATIENT)
Dept: SURGERY | Age: 65
End: 2020-06-11
Payer: MEDICARE

## 2020-06-12 ENCOUNTER — APPOINTMENT (OUTPATIENT)
Dept: GENERAL RADIOLOGY | Age: 65
End: 2020-06-12
Attending: PODIATRIST
Payer: MEDICARE

## 2020-06-12 ENCOUNTER — HOSPITAL ENCOUNTER (OUTPATIENT)
Age: 65
Setting detail: OUTPATIENT SURGERY
Discharge: HOME | End: 2020-06-12
Attending: PODIATRIST | Admitting: PODIATRIST
Payer: MEDICARE

## 2020-06-12 ENCOUNTER — ANESTHESIA (OUTPATIENT)
Dept: SURGERY | Age: 65
End: 2020-06-12
Payer: MEDICARE

## 2020-06-12 VITALS
DIASTOLIC BLOOD PRESSURE: 73 MMHG | TEMPERATURE: 97.4 F | WEIGHT: 158 LBS | BODY MASS INDEX: 28 KG/M2 | HEART RATE: 62 BPM | RESPIRATION RATE: 11 BRPM | SYSTOLIC BLOOD PRESSURE: 134 MMHG | OXYGEN SATURATION: 100 % | HEIGHT: 63 IN

## 2020-06-12 PROBLEM — M21.621 TAILOR'S BUNION OF RIGHT FOOT: Status: ACTIVE | Noted: 2020-06-12

## 2020-06-12 PROBLEM — M79.671 FOOT PAIN, RIGHT: Status: ACTIVE | Noted: 2020-06-12

## 2020-06-12 PROBLEM — Q66.211 METATARSUS PRIMUS VARUS OF RIGHT FOOT: Status: ACTIVE | Noted: 2020-06-12

## 2020-06-12 PROBLEM — M20.11 HALLUX VALGUS (ACQUIRED), RIGHT FOOT: Status: ACTIVE | Noted: 2020-06-12

## 2020-06-12 PROBLEM — M21.861 GASTROCNEMIUS EQUINUS OF RIGHT LOWER EXTREMITY: Status: ACTIVE | Noted: 2020-06-12

## 2020-06-12 PROCEDURE — 77030006773 HC BLD SAW OSC BRSM -A: Performed by: PODIATRIST

## 2020-06-12 PROCEDURE — 74011000250 HC RX REV CODE- 250: Performed by: ANESTHESIOLOGY

## 2020-06-12 PROCEDURE — 73630 X-RAY EXAM OF FOOT: CPT

## 2020-06-12 PROCEDURE — 76060000034 HC ANESTHESIA 1.5 TO 2 HR: Performed by: PODIATRIST

## 2020-06-12 PROCEDURE — 74011250636 HC RX REV CODE- 250/636: Performed by: NURSE ANESTHETIST, CERTIFIED REGISTERED

## 2020-06-12 PROCEDURE — C1713 ANCHOR/SCREW BN/BN,TIS/BN: HCPCS | Performed by: PODIATRIST

## 2020-06-12 PROCEDURE — 77030010507 HC ADH SKN DERMBND J&J -B

## 2020-06-12 PROCEDURE — 77030013079 HC BLNKT BAIR HGGR 3M -A: Performed by: NURSE ANESTHETIST, CERTIFIED REGISTERED

## 2020-06-12 PROCEDURE — 74011250636 HC RX REV CODE- 250/636: Performed by: PODIATRIST

## 2020-06-12 PROCEDURE — 74011000250 HC RX REV CODE- 250: Performed by: PODIATRIST

## 2020-06-12 PROCEDURE — 77030040361 HC SLV COMPR DVT MDII -B

## 2020-06-12 PROCEDURE — 77030000032 HC CUF TRNQT ZIMM -B: Performed by: PODIATRIST

## 2020-06-12 PROCEDURE — 76210000020 HC REC RM PH II FIRST 0.5 HR: Performed by: PODIATRIST

## 2020-06-12 PROCEDURE — 74011250636 HC RX REV CODE- 250/636: Performed by: ANESTHESIOLOGY

## 2020-06-12 PROCEDURE — 74011250636 HC RX REV CODE- 250/636

## 2020-06-12 PROCEDURE — 77030037938 HC BLD OSC SAW TREA -B: Performed by: PODIATRIST

## 2020-06-12 PROCEDURE — 77030003601 HC NDL NRV BLK BBMI -A

## 2020-06-12 PROCEDURE — 77030013837 HC NERV BLK KT BBMI -B

## 2020-06-12 PROCEDURE — 64450 NJX AA&/STRD OTHER PN/BRANCH: CPT

## 2020-06-12 PROCEDURE — 77030031139 HC SUT VCRL2 J&J -A: Performed by: PODIATRIST

## 2020-06-12 PROCEDURE — 76210000063 HC OR PH I REC FIRST 0.5 HR: Performed by: PODIATRIST

## 2020-06-12 PROCEDURE — 77030020268 HC MISC GENERAL SUPPLY: Performed by: PODIATRIST

## 2020-06-12 PROCEDURE — 77030002916 HC SUT ETHLN J&J -A: Performed by: PODIATRIST

## 2020-06-12 PROCEDURE — 76010000153 HC OR TIME 1.5 TO 2 HR: Performed by: PODIATRIST

## 2020-06-12 PROCEDURE — 77030011640 HC PAD GRND REM COVD -A: Performed by: PODIATRIST

## 2020-06-12 PROCEDURE — 77030018836 HC SOL IRR NACL ICUM -A: Performed by: PODIATRIST

## 2020-06-12 PROCEDURE — 77030036714 HC WRE K FIXOS STRY -B: Performed by: PODIATRIST

## 2020-06-12 PROCEDURE — 77030040922 HC BLNKT HYPOTHRM STRY -A

## 2020-06-12 DEVICE — ANATOMIC BIPLANAR IMPLANTS
Type: IMPLANTABLE DEVICE | Site: FOOT | Status: FUNCTIONAL
Brand: LAPIPLASTY SYSTEM 2

## 2020-06-12 RX ORDER — NALOXONE HYDROCHLORIDE 0.4 MG/ML
0.2 INJECTION, SOLUTION INTRAMUSCULAR; INTRAVENOUS; SUBCUTANEOUS
Status: DISCONTINUED | OUTPATIENT
Start: 2020-06-12 | End: 2020-06-12 | Stop reason: HOSPADM

## 2020-06-12 RX ORDER — LIDOCAINE HYDROCHLORIDE 10 MG/ML
0.1 INJECTION, SOLUTION EPIDURAL; INFILTRATION; INTRACAUDAL; PERINEURAL AS NEEDED
Status: DISCONTINUED | OUTPATIENT
Start: 2020-06-12 | End: 2020-06-12 | Stop reason: HOSPADM

## 2020-06-12 RX ORDER — SODIUM CHLORIDE, SODIUM LACTATE, POTASSIUM CHLORIDE, CALCIUM CHLORIDE 600; 310; 30; 20 MG/100ML; MG/100ML; MG/100ML; MG/100ML
125 INJECTION, SOLUTION INTRAVENOUS CONTINUOUS
Status: DISCONTINUED | OUTPATIENT
Start: 2020-06-12 | End: 2020-06-12 | Stop reason: HOSPADM

## 2020-06-12 RX ORDER — ROPIVACAINE HYDROCHLORIDE 5 MG/ML
INJECTION, SOLUTION EPIDURAL; INFILTRATION; PERINEURAL AS NEEDED
Status: DISCONTINUED | OUTPATIENT
Start: 2020-06-12 | End: 2020-06-12 | Stop reason: HOSPADM

## 2020-06-12 RX ORDER — ONDANSETRON 2 MG/ML
INJECTION INTRAMUSCULAR; INTRAVENOUS AS NEEDED
Status: DISCONTINUED | OUTPATIENT
Start: 2020-06-12 | End: 2020-06-12 | Stop reason: HOSPADM

## 2020-06-12 RX ORDER — FLUMAZENIL 0.1 MG/ML
0.2 INJECTION INTRAVENOUS
Status: DISCONTINUED | OUTPATIENT
Start: 2020-06-12 | End: 2020-06-12 | Stop reason: HOSPADM

## 2020-06-12 RX ORDER — DIPHENHYDRAMINE HYDROCHLORIDE 50 MG/ML
12.5 INJECTION, SOLUTION INTRAMUSCULAR; INTRAVENOUS AS NEEDED
Status: DISCONTINUED | OUTPATIENT
Start: 2020-06-12 | End: 2020-06-12 | Stop reason: HOSPADM

## 2020-06-12 RX ORDER — DEXAMETHASONE SODIUM PHOSPHATE 4 MG/ML
INJECTION, SOLUTION INTRA-ARTICULAR; INTRALESIONAL; INTRAMUSCULAR; INTRAVENOUS; SOFT TISSUE AS NEEDED
Status: DISCONTINUED | OUTPATIENT
Start: 2020-06-12 | End: 2020-06-12 | Stop reason: HOSPADM

## 2020-06-12 RX ORDER — PROPOFOL 10 MG/ML
INJECTION, EMULSION INTRAVENOUS
Status: DISCONTINUED | OUTPATIENT
Start: 2020-06-12 | End: 2020-06-12 | Stop reason: HOSPADM

## 2020-06-12 RX ORDER — HYDROMORPHONE HYDROCHLORIDE 1 MG/ML
.25-1 INJECTION, SOLUTION INTRAMUSCULAR; INTRAVENOUS; SUBCUTANEOUS
Status: DISCONTINUED | OUTPATIENT
Start: 2020-06-12 | End: 2020-06-12 | Stop reason: HOSPADM

## 2020-06-12 RX ORDER — FENTANYL CITRATE 50 UG/ML
INJECTION, SOLUTION INTRAMUSCULAR; INTRAVENOUS AS NEEDED
Status: DISCONTINUED | OUTPATIENT
Start: 2020-06-12 | End: 2020-06-12 | Stop reason: HOSPADM

## 2020-06-12 RX ORDER — FENTANYL CITRATE 50 UG/ML
INJECTION, SOLUTION INTRAMUSCULAR; INTRAVENOUS AS NEEDED
Status: DISCONTINUED | OUTPATIENT
Start: 2020-06-12 | End: 2020-06-12

## 2020-06-12 RX ORDER — MIDAZOLAM HYDROCHLORIDE 1 MG/ML
INJECTION, SOLUTION INTRAMUSCULAR; INTRAVENOUS AS NEEDED
Status: DISCONTINUED | OUTPATIENT
Start: 2020-06-12 | End: 2020-06-12 | Stop reason: HOSPADM

## 2020-06-12 RX ORDER — BUPIVACAINE HYDROCHLORIDE 5 MG/ML
INJECTION, SOLUTION EPIDURAL; INTRACAUDAL AS NEEDED
Status: DISCONTINUED | OUTPATIENT
Start: 2020-06-12 | End: 2020-06-12 | Stop reason: HOSPADM

## 2020-06-12 RX ADMIN — MIDAZOLAM HYDROCHLORIDE 2 MG: 2 INJECTION, SOLUTION INTRAMUSCULAR; INTRAVENOUS at 13:42

## 2020-06-12 RX ADMIN — CEFAZOLIN SODIUM 2 G: 1 POWDER, FOR SOLUTION INTRAMUSCULAR; INTRAVENOUS at 14:38

## 2020-06-12 RX ADMIN — MIDAZOLAM HYDROCHLORIDE 1 MG: 2 INJECTION, SOLUTION INTRAMUSCULAR; INTRAVENOUS at 14:52

## 2020-06-12 RX ADMIN — BUPIVACAINE HYDROCHLORIDE 10 ML/HR: 7.5 INJECTION, SOLUTION EPIDURAL; RETROBULBAR at 17:15

## 2020-06-12 RX ADMIN — SODIUM CHLORIDE, SODIUM LACTATE, POTASSIUM CHLORIDE, AND CALCIUM CHLORIDE: 600; 310; 30; 20 INJECTION, SOLUTION INTRAVENOUS at 14:29

## 2020-06-12 RX ADMIN — SODIUM CHLORIDE, SODIUM LACTATE, POTASSIUM CHLORIDE, AND CALCIUM CHLORIDE 125 ML/HR: 600; 310; 30; 20 INJECTION, SOLUTION INTRAVENOUS at 13:20

## 2020-06-12 RX ADMIN — ROPIVACAINE HYDROCHLORIDE 30 ML: 5 INJECTION, SOLUTION EPIDURAL; INFILTRATION; PERINEURAL at 13:46

## 2020-06-12 RX ADMIN — PROPOFOL 75 MCG/KG/MIN: 10 INJECTION, EMULSION INTRAVENOUS at 14:35

## 2020-06-12 RX ADMIN — FENTANYL CITRATE 50 MCG: 0.05 INJECTION, SOLUTION INTRAMUSCULAR; INTRAVENOUS at 13:49

## 2020-06-12 RX ADMIN — ROPIVACAINE HYDROCHLORIDE 10 ML: 5 INJECTION, SOLUTION EPIDURAL; INFILTRATION; PERINEURAL at 13:51

## 2020-06-12 RX ADMIN — DEXAMETHASONE SODIUM PHOSPHATE 4 MG: 4 INJECTION, SOLUTION INTRAMUSCULAR; INTRAVENOUS at 15:20

## 2020-06-12 RX ADMIN — FENTANYL CITRATE 50 MCG: 0.05 INJECTION, SOLUTION INTRAMUSCULAR; INTRAVENOUS at 13:42

## 2020-06-12 RX ADMIN — ONDANSETRON HYDROCHLORIDE 4 MG: 2 SOLUTION INTRAMUSCULAR; INTRAVENOUS at 16:06

## 2020-06-12 RX ADMIN — MIDAZOLAM HYDROCHLORIDE 1 MG: 2 INJECTION, SOLUTION INTRAMUSCULAR; INTRAVENOUS at 15:13

## 2020-06-12 NOTE — ANESTHESIA PROCEDURE NOTES
Peripheral Block    Start time: 6/12/2020 1:42 PM  End time: 6/12/2020 1:52 PM  Performed by: Any Galicia MD  Authorized by: Any Galicia MD       Pre-procedure: Indications: at surgeon's request and post-op pain management    Preanesthetic Checklist: patient identified, risks and benefits discussed, site marked, timeout performed, anesthesia consent given and patient being monitored    Timeout Time: 13:42          Block Type:   Block Type:  Popliteal and saphenous  Laterality:  Right  Monitoring:  Continuous pulse ox, frequent vital sign checks, heart rate, responsive to questions and oxygen  Injection Technique:  Continuous  Procedures: ultrasound guided and nerve stimulator    Patient Position: supine  Prep: chlorhexidine    Location:  Upper thigh  Needle Type:  Tuohy  Needle Gauge:  18 G  Needle Localization:  Nerve stimulator and ultrasound guidance    Assessment:  Number of attempts:  1  Injection Assessment:  Incremental injection every 5 mL, local visualized surrounding nerve on ultrasound, negative aspiration for blood, no paresthesia and no intravascular symptoms  Patient tolerance:  Patient tolerated the procedure well with no immediate complications  Tibial nerve block performed with ultrasound, nerve stimulation and landmark identification. Needle utilized was 4\"stimuplex 21G. 10cc 0.5% ropivacaine administered slowly with intermittent aspirations.

## 2020-06-12 NOTE — BRIEF OP NOTE
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 Item Serial No.  Lot No. LRB No. Used Action   Lapiplasty system 2    NA [place] [place] [id number] Right 1 Implanted   Styker 2.5mm x 22mm cannulated screw   NA [employer] NA Right 1 Implanted   Styker 2.5mm x 18mm cannulated screw   NA [employer] NA Right 1 Implanted       Drains: * No LDAs found *    Findings: contracted gastrocnemius aponeurosis, lateral eminence 5th metatarsal head, instability to first tarsometatarsal joint with varus deviation and pronation, instability intercuneiform joint. Hallux valgus noted.     Electronically Signed by Katiuska Miller DPM on 6/12/2020 at 4:33 PM

## 2020-06-12 NOTE — OP NOTES
Operative Report    Patient: Keri Rojas MRN: [id number]  SSN: [id number]    YOB: 1955  Age: 75 y.o. 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 Deformity included tight Achilles tendon right lower extremity exacerbating right foot deformity with overloading of the first and fifth metatarsal heads as well as bunion deformity and Tailors bunion deformity. Patient attempted non-surgical treatment with otc devices, nsaids, ice, rest, shoe changes without resolution of her symptoms. Radiographic imaging and physical exam showed a varus deviation of the first metatarsal at the unstable and rotated first tarsometatarsal joint with distal hallux abductovalgus deformity. I discussed surgical correction due to failed non-surgical management and radiographic findings. I discussed the procedures at length including the expected post operative course, risks, alternatives, and possible complications. No guarantees given and the patient elected to proceed. Procedure in Detail:     This 53-year-old female received a popliteal and saphenous block in preoperative holding. She was then brought back to the operating room and placed supine on the operating room table. After adequate IV sedation, the right lower extremity was prepped and draped in usual aseptic fashion. After exsanguination of the foot with an Esmarch bandage and placement of padding at the ankle, a pneumatic calf tourniquet was inflated to 250 mmHg. Fusion 1st tarsometatarsal joint Right foot: Attention was then directed to the right foot where there was a large medial eminence at the first metatarsal head due to instability at the first tarsometatarsal joint. A dorsal linear incision was made over the first tarsometatarsal joint approximately 5 cm in length. Incision was deepened using sharp and blunt dissection with care taken to identify and retract vital neurovascular structures. Next soft tissue attachments were dissected free from the base of the first metatarsal as well as the medial cuneiform. A saw was then the inserted into the joint to resected any plantar shelf. Next a joystick pin was inserted into the base of the first metatarsal. Fluoroscopy was used to confirm full reduction of the deformity including the rotational reduction of the sesamoids. Next a clamp was used to reduce the first intermetatarsal angle to zero with correction of the rotational deformity and sesamoid position. This was then held in position. The cut guide was then applied to the joint. The joint surfaces were then resected. The resected bone was then passed from the field. The adjacent sides of the joint were then fenestrated and drilled. The joint was then compressed with the compressor. Radiographs confirmed correction of first metatarsal deformity. Temporary fixation performed using 2 threaded k-wires. Next the joint was permanently fixated using lapiplasty 2 plates and screws at 90 degrees to eachother.  Deformity correction was then assessed and noted to be rectus. The wound was then irrigated with copious amounts of normal saline and closed in layers. Bunionectomy with Akin Right  Next attention was directed to the deformity of the right first metatarsophalangeal joint. A linear incision was made lateral to the first metatarsophalangeal joint approximately 2 cm in length. This incision was deepened using sharp and blunt dissection with care taken to identify and retract vital neurovascular structures. This expose the lateral aspect of the first metatarsophalangeal joint. A lateral capsulotomy was performed with release of the collateral ligaments around the fibular sesamod. Soft tissue attachments of the base of the lateral condyle of the proximal phalanx were also released. Next the lateral contracture of the hallux at the first metatarsophalangeal joint was checked and noted to be reduced. Next another incision was made to the medial aspect of the first metatarsophalangeal joint approximately 3 cm in length. Incision was deepened using sharp and blunt dissection with care taken to identify and retract the neurovascular structures in the area. These were retracted dorsally and plantarly. Next a lenticular capsulotomy was performed to the dorsal medial aspect first metatarsophalangeal joint periosteal and capsular dissection was then performed and reflected plantarly and dorsally. The medial eminence of the first metatarsal was resected and passed from the field using a saw. Next a transverse wedge type osteotomy was performed to the metaphysis of the proximal phalanx of the hallux with the base medial and apex lateral maintaining the lateral cortical bone. The interposing bone was then removed and passed from the field. The osteotomy was then closed using manual reduction and held in temporary fixation with a k-wire. Next a 2.5 mm Lewis screw was inserted over the wire for permanent fixation.  This was confirmed using fluoroscopy showing anatomic alignment of the first metatarsophalangeal joint. The wire was then removed and passed from the field. The wounds were then irrigated with copious amounts of normal saline and closed in layers. Tailors Bunionectomy right foot  Next attention was directed to the lateral eminence at the fifth metatarsophalangeal joint. A lateral incision approximately 3 cm in length was performed. The incision was deepened using sharp and blunt dissection with care taken to identify and retract vital neurovascular structures. Next a lateral capsulotomy was performed exposing the lateral aspect of the fifth metatarsophalangeal joint. Soft tissue attachments were dissected free from the head of the fifth metatarsal. The lateral prominence was then resected using power saw. The lateral prominent bone was then passed from the field. Next the plantar fifth metatarsal head was resected to creating normal plantar contour. The wound was then irrigated with copious amounts of normal saline. Next the joint capsule was then closed. Skin incision was then closed in layers. Gastrocnemius Recession  Next attention was directed to the distal one third of the right lower extremity at the level of the gastrocnemius aponeurosis. A medial incision was made approximately 1 cm in length. The soft tissue attachments were then freed from posterior aspect of the gastroc aponeurosis. Next the gastrocnemius aponeurosis was transected with dorsiflexion pressure applied to the right foot. Upon release there was noted improvement of ankle range of motion. The wound was then irrigated with copious amounts of normal saline and closed in layers. Patient tolerated the procedures and anesthesia well. The pneumatic tourniquet was deflated with prompt hyperemic response to the right foot. Postoperative dressing was applied including a posterior splint. Patient was then transferred to the PACU for postoperative monitoring.  From there she will be discharged home and follow up in my office next week. Estimated Blood Loss:  10    Tourniquet Time:   Total Tourniquet Time Documented:  Calf (Right) - 96 minutes  Total: Calf (Right) - 96 minutes        Implants:   Implant Name Type Inv. Item Serial No.  Lot No. LRB No. Used Action   Lapiplasty system 2    NA [employer] [place] [id number] Right 1 Implanted   Styker 2.5mm x 22mm cannulated screw   [employer] NA Right 1 Implanted   Styker 2.5mm x 18mm cannulated screw   [employer] NA Right 1 Implanted               Specimens: * No specimens in log *        Drains: None                Complications: None    Counts: Sponge and needle counts were correct times two.     Signed By:  aKtty Gordillo DPM     June 12, 2020

## 2020-06-12 NOTE — DISCHARGE INSTRUCTIONS
Dr. Natali Whitmore. Tatianna, DPM FACFAS FACFAOM FACCWS    The [facility]  Post Operative Instructions: You have had a surgical procedure on your right foot. Fluids and Diet:  Begin with clear liquids, broth, dry toast, and crackers. If not nauseated then resume your regular pre-operative diet when you are ready    Medications: Take your prescriptions as directed  If your pain is not severe then you may take the non-prescription medication that you normally take for aches and pains  You may resume your regularly scheduled medications (unless otherwise directed)  If any side effects or adverse reactions occur, discontinue the medication and contact your doctor. Review the patient drug information that is provided before you take any medication    Ambulation and Activity:  You are advised to go directly home from the hospital  Use knee scooter, walker, crutches as needed  You may not put weight on the operated foot. Avoid stairs if possible. Do not lift or move heavy objects  Do not drive until cleared by Dr. Donato Garland and Wound Care Instructions:  Keep bandage clean and dry  Do not shower or bathe the operative extremity  Do not remove the bandage (unless otherwise directed)  Do not attempt to put anything between the cast or dressing and your skin, some itching is normal.    Ice and Elevation:  Elevate operative extremity as much as possible to reduce swelling and discomfort. Elevate with 2 pillows at or above the level of the heart for the first 72 hours. Ice:  [facility] dispensed insulated ice bag over the bandage 20 minutes of every hour while awake for the first 72 hours. You may behind the knee as well. Special Instructions: Call your doctor immediately if you develop any of the following. Fever over 100 degrees by mouth - take your temperature daily until your first follow up visit.   Pain not relieved by medication ordered  Swelling, increased redness, warmth, or hardness around operative area. Numb, tingling or cold toes. Toe(s) become white or bluish  Bandage becomes wet, soiled, or blood soaked (small amount of bleeding may be normal)  Increased or progressive drainage from surgical area. Follow up instructions: Your first post operative appointment is scheduled for Tuesday    Call Dr. Alba Nova office if you have any questions or concerns. DISCHARGE SUMMARY from your Nurse      PATIENT INSTRUCTIONS    After general anesthesia or intravenous sedation, for 24 hours or while taking prescription Narcotics:  · Limit your activities  · Do not drive and operate hazardous machinery  · Do not make important personal or business decisions  · Do  not drink alcoholic beverages  · If you have not urinated within 8 hours after discharge, please contact your surgeon on call. Report the following to your surgeon:  · Excessive pain, swelling, redness or odor of or around the surgical area  · Temperature over 100.5  · Nausea and vomiting lasting longer than 4 hours or if unable to take medications  · Any signs of decreased circulation or nerve impairment to extremity: change in color, persistent  numbness, tingling, coldness or increase pain  · Any questions      GOOD HELP TO FIGHT AN INFECTION  Here are a few tip to help reduce the chance of getting an infection after surgery:   Wash Your Hands   Good handwashing is the most important thing you and your caregiver can do.  Wash before and after caring for any wounds. Dry your hand with a clean towel.  Wash with soap and water for at least 20 seconds. A TIP: sing the \"Happy Birthday\" song through one time while washing to help with the timing.  Use a hand  in between washings.  Shower   When your surgeon says it is OK to take a shower, use a new bar of antibacterial soap (if that is what you use, and keep that bar of soap ONLY for your use), or antibacterial body wash.    Use a clean wash cloth or sponge when you bathe.   Dry off with a clean towel  after every bath - be careful around any wounds, skin staples, sutures or surgical glue over/on wounds.  Do not enter swimming pools, hot tubs, lakes, rivers and/or ocean until wounds are healed and your doctor/surgeon says it is OK.  Use Clean Sheets   Sleep on freshly laundered sheets after your surgery.  Keep the surgery site covered with a clean, dry bandage (if instructed to do so). If the bandage becomes soiled, reapply a new, dry, clean bandage.  Do not allow pets to sleep with you while your wound is healing.  Lifestyle Modification and Controlling Your Blood Sugar   Smoking slows wound healing. Stop smoking and limit exposure to second-hand smoke.  High blood sugar slows wound healing. Eat a well-balanced diet to provide proper nutrition while healing   Monitor your blood sugar (if you are a diabetic) and take your medications as you are suppose to so you can control you blood sugar after surgery. COUGH AND DEEP BREATHE    Breathing deeply and coughing are very important exercises to do after surgery. Deep breathing and coughing open the little air tubes and air sacks in your lungs. You take deep breaths every day. You may not even notice - it is just something you do when you sigh or yawn. It is a natural exercise you do to keep these air passages open. After surgery, take deep breaths and cough, on purpose. DIRECTIONS:  · Take 10 to 15 slow deep breaths every hour while awake. · Breathe in deeply, and hold it for 2 seconds. · Exhale slowly through puckered lips, like blowing up a balloon. · After every 4th or 5th deep breath, hug your pillow to your chest or belly and give a hard, deep cough. Yes, it will probably hurt. But doing this exercise is a very important part of healing after surgery. Take your pain medicine to help you do this exercise without too much pain.     Coughing and deep breathing help prevent bronchitis and pneumonia after surgery. If you had chest or belly surgery, use a pillow as a \"hug juan\" and hold it tightly to your chest or belly when you cough. ANKLE PUMPS    Ankle pumps increase the circulation of oxygenated blood to your lower extremities and decrease your risk for circulation problems such as blood clots. They also stretch the muscles, tendons and ligaments in your foot and ankle, and prevent joint contracture in the ankle and foot, especially after surgeries on the legs. It is important to do ankle pump exercises regularly after surgery because immobility increases your risk for developing a blood clot. Your doctor may also have you take an Aspirin for the next few days as well. If your doctor did not ask you to take an Aspirin, consult with him before starting Aspirin therapy on your own. The exercise is quite simple. · Slowly point your foot forward, feeling the muscles on the top of your lower leg stretch, and hold this position for 5 seconds. · Next, pull your foot back toward you as far as possible, stretching the calf muscles, and hold that position for 5 seconds. · Repeat with the other foot. · Perform 10 repetitions every hour while awake for both ankles if possible (down and then up with the foot once is one repetition). You should feel gentle stretching of the muscles in your lower leg when doing this exercise. If you feel pain, or your range of motion is limited, don't push too hard. Only go the limit your joint and muscles will let you go. If you have increasing pain, progressively worsening leg warmth or swelling, STOP the exercise and call your doctor. MEDICATION AND   SIDE EFFECT GUIDE    The [employer] MEDICATION AND SIDE EFFECT GUIDE was provided to the PATIENT AND CARE PROVIDER.   Information provided includes instruction about drug purpose and common side effects for the following medications: · ***        These are general instructions for a healthy lifestyle:    *   Please give a list of your current medications to your Primary Care Provider. *   Please update this list whenever your medications are discontinued, doses are changed, or new medications (including over-the-counter products) are added. *   Please carry medication information at all times in case of emergency situations. About Smoking  No smoking / No tobacco products  Avoid exposure to second hand smoke     Surgeon General's Warning:  Quitting smoking now greatly reduces serious risk to your health. Obesity, smoking, and sedentary lifestyle greatly increases your risk for illness and disease. A healthy diet, regular physical exercise & weight monitoring are important for maintaining a healthy lifestyle. Congestive Heart Failure  You may be retaining fluid if you have a history of heart failure or if you experience any of the following symptoms:  Weight gain of 3 pounds or more overnight or 5 pounds in a week, increased swelling in your hands or feet or shortness of breath while lying flat in bed. Please call your doctor as soon as you notice any of these symptoms; do not wait until your next office visit. Recognize signs and symptoms of STROKE:  F -  Face looks uneven  A -  Arms unable to move or move evenly  S -  Speech slurred or non-existent  T -  Time-call 911 as soon as signs and symptoms begin-DO NOT go          back to bed or wait to see if you get better-TIME IS BRAIN. Warning Signs of HEART ATTACK   Call 911 if you have these symptoms:     Chest discomfort. Most heart attacks involve discomfort in the center of the chest that lasts more than a few minutes, or that goes away and comes back. It can feel like uncomfortable pressure, squeezing, fullness, or pain.  Discomfort in other areas of the upper body.  Symptoms can include pain or discomfort in one or both arms, the back, neck, jaw, or stomach.  Shortness of breath with or without chest discomfort.  Other signs may include breaking out in a cold sweat, nausea, or lightheadedness. Don't wait more than five minutes to call 911 - MINUTES MATTER! Fast action can save your life. Calling 911 is almost always the fastest way to get lifesaving treatment. Emergency Medical Services staff can begin treatment when they arrive -- up to an hour sooner than if someone gets to the hospital by car. Learning About Coronavirus [phone])  Coronavirus [phone]): Overview  What is coronavirus (COVID-19)? The coronavirus disease (COVID-19) is caused by a virus. It is an illness that was first found in [place], [place], in December 2019. It has since spread worldwide. The virus can cause fever, cough, and trouble breathing. In severe cases, it can cause pneumonia and make it hard to breathe without help. It can cause death. Coronaviruses are a large group of viruses. They cause the common cold. They also cause more serious illnesses like Middle East respiratory syndrome (MERS) and severe acute respiratory syndrome (SARS). COVID-19 is caused by a novel coronavirus. That means it's a new type that has not been seen in people before. This virus spreads person-to-person through droplets from coughing and sneezing. It can also spread when you are close to someone who is infected. And it can spread when you touch something that has the virus on it, such as a doorknob or a tabletop. What can you do to protect yourself from coronavirus (COVID-19)? The best way to protect yourself from getting sick is to:  · Avoid areas where there is an outbreak. · Avoid contact with people who may be infected. · Wash your hands often with soap or alcohol-based hand sanitizers. · Avoid crowds and try to stay at least 6 feet away from other people. · Wash your hands often, especially after you cough or sneeze. Use soap and water, and scrub for at least 20 seconds.  If soap and water aren't available, use an alcohol-based hand . · Avoid touching your mouth, nose, and eyes. What can you do to avoid spreading the virus to others? To help avoid spreading the virus to others:  · Cover your mouth with a tissue when you cough or sneeze. Then throw the tissue in the trash. · Use a disinfectant to clean things that you touch often. · Stay home if you are sick or have been exposed to the virus. Don't go to school, work, or public areas. And don't use public transportation. · If you are sick:  ? Leave your home only if you need to get medical care. But call the doctor's office first so they know you're coming. And wear a face mask, if you have one.  ? If you have a face mask, wear it whenever you're around other people. It can help stop the spread of the virus when you cough or sneeze. ? Clean and disinfect your home every day. Use household  and disinfectant wipes or sprays. Take special care to clean things that you grab with your hands. These include doorknobs, remote controls, phones, and handles on your refrigerator and microwave. And don't forget countertops, tabletops, bathrooms, and computer keyboards. When to call for help  Call 911 anytime you think you may need emergency care. For example, call if:  · You have severe trouble breathing. (You can't talk at all.)  · You have constant chest pain or pressure. · You are severely dizzy or lightheaded. · You are confused or can't think clearly. · Your face and lips have a blue color. · You pass out (lose consciousness) or are very hard to wake up. Call your doctor now if you develop symptoms such as:  · Shortness of breath. · Fever. · Cough. If you need to get care, call ahead to the doctor's office for instructions before you go. Make sure you wear a face mask, if you have one, to prevent exposing other people to the virus. Where can you get the latest information?   The following health organizations are tracking and studying this virus. Their websites contain the most up-to-date information. Cristiane Winslow also learn what to do if you think you may have been exposed to the virus. · U.S. Centers for Disease Control and Prevention (CDC): The CDC provides updated news about the disease and travel advice. The website also tells you how to prevent the spread of infection. www.cdc.gov  · World Health Organization [facility]): WHO offers information about the virus outbreaks. WHO also has travel advice. www.who.int  Current as of: April 1, 2020               Content Version: 12.4  © 2006-2020 Healthwise, [employer]. Care instructions adapted under license by your healthcare professional. If you have questions about a medical condition or this instruction, always ask your healthcare professional. [address] any warranty or liability for your use of this information. The discharge information has been reviewed with the {PATIENT PARENT GUARDIAN:29005}. Any questions and concerns from the {PATIENT PARENT GUARDIAN:37969} have been addressed. The {PATIENT PARENT GUARDIAN:97310} verbalized understanding. Other information in your discharge envelope:  []     PRESCRIPTIONS  []     PHYSICAL THERAPY PRESCRIPTION  []     APPOINTMENT CARDS  []     Regional Anesthesia Pamphlet for block or block with On-Q Catheter from   Anesthesia Service  []     Medical device information sheets/pamphlets from their    []     School/work excuse note. []     /parent work excuse note. The following personal items collected during your admission are returned to you:   Dental Appliance: Dental Appliances: None  Vision:    Hearing Aid: Hearing Aid: Bilateral, Sent home  Jewelry: Jewelry: None  Clothing: Clothing: (Street clothes to PACU)  Other Valuables:  Other Valuables: None  Valuables sent to safe:

## 2020-06-12 NOTE — ANESTHESIA POSTPROCEDURE EVALUATION
Procedure(s):  FUSION FIRST TARSOMETATARSAL JOINT/ BUNIONECTOMY WITH WENDY OSTEOTOMY/ TAILORS BUNIONECTOMY/GASTROCNEMIUS RESECTION RIGHT FOOT (MAC/POP/SAPHENOUS WITH CATHERTER). MAC, regional    Anesthesia Post Evaluation      Multimodal analgesia: multimodal analgesia not used between 6 hours prior to anesthesia start to PACU discharge  Patient location during evaluation: PACU  Patient participation: complete - patient participated  Level of consciousness: awake  Pain management: adequate  Airway patency: patent  Anesthetic complications: no  Cardiovascular status: acceptable, blood pressure returned to baseline and hemodynamically stable  Respiratory status: acceptable  Hydration status: acceptable  Post anesthesia nausea and vomiting:  controlled      INITIAL Post-op Vital signs:   Vitals Value Taken Time   /67 6/12/2020  4:45 PM   Temp 36.4 °C (97.5 °F) 6/12/2020  4:36 PM   Pulse 60 6/12/2020  4:47 PM   Resp 11 6/12/2020  4:47 PM   SpO2 100 % 6/12/2020  4:47 PM   Vitals shown include unvalidated device data.

## 2020-06-12 NOTE — ANESTHESIA PREPROCEDURE EVALUATION
Relevant Problems   No relevant active problems       Anesthetic History   No history of anesthetic complications            Review of Systems / Medical History  Patient summary reviewed and pertinent labs reviewed    Pulmonary  Within defined limits                 Neuro/Psych         Psychiatric history     Cardiovascular                  Exercise tolerance: >4 METS     GI/Hepatic/Renal               Comments: [facility]/Other      Hypothyroidism       Other Findings              Physical Exam    Airway  Mallampati: II  TM Distance: 4 - 6 cm  Neck ROM: normal range of motion   Mouth opening: Normal     Cardiovascular  Regular rate and rhythm,  S1 and S2 normal,  no murmur, click, rub, or gallop  Rhythm: regular  Rate: normal         Dental  No notable dental hx       Pulmonary  Breath sounds clear to auscultation               Abdominal  GI exam deferred       Other Findings            Anesthetic Plan    ASA: 2  Anesthesia type: MAC and regional - popliteal fossa block and saphenous block      Post-op pain plan if not by surgeon: peripheral nerve block single    Induction: Intravenous  Anesthetic plan and risks discussed with: Patient

## 2020-06-12 NOTE — INTERVAL H&P NOTE
Update History & Physical 
 
The Patient's History and Physical of June 8, 2020 was reviewed with the patient and I examined the patient. There was no change. The surgical site was confirmed by the patient and me. The patient was counseled at length about the risks of nora Covid-19 during their perioperative period and any recovery window from their procedure. The patient was made aware that nora Covid-19  may worsen their prognosis for recovering from their procedure and lend to a higher morbidity and/or mortality risk. All material risks, benefits, and reasonable alternatives including postponing the procedure were discussed. The patient does  wish to proceed with the procedure at this time. Plan:  The risk, benefits, expected outcome, and alternative to the recommended procedure have been discussed with the patient. Patient understands and wants to proceed with the procedure.  
 
Electronically signed by Alexandra Ponce DPM on 6/12/2020 at 2:07 PM

## 2020-07-21 ENCOUNTER — HOSPITAL ENCOUNTER (OUTPATIENT)
Dept: PHYSICAL THERAPY | Age: 65
Discharge: HOME | End: 2020-07-21
Payer: MEDICARE

## 2020-07-21 PROCEDURE — 97162 PT EVAL MOD COMPLEX 30 MIN: CPT

## 2020-07-21 NOTE — PROGRESS NOTES
[facility]. [place] [address]  Phone: [phone]  Fax: [phone]    Plan of Care/Statement of Necessity for Physical Therapy Services  2-15    Patient name: Ban Tomas  : 1955  Provider#: [id number]  Referral source: Taras Guido DPM      Medical/Treatment Diagnosis: Right foot pain [M79.671]     Prior Hospitalization: see medical history     Comorbidities: depression, thyroid problems  Prior Level of Function: no AD  Medications: Verified on Patient Summary List  Start of Care: 2020      Onset Date: 2020   The Plan of Care and following information is based on the information from the initial evaluation. Assessment/ key information: Patient is s/p R foot surgery: fusion first tarsometatarsal joint, bunionectomy, tailors bunionectomy, and gastrocnemius resection. Patient presents with the following deficits: decreased R ankle/foot ROM and strength, decreased R hip strength, increased R ankle/foot swelling. Patient to benefit from skilled physical therapy in order to address deficits and maximize functional mobility. Evaluation Complexity History MEDIUM  Complexity : 1-2 comorbidities / personal factors will impact the outcome/ POC ; Examination HIGH Complexity : 4+ Standardized tests and measures addressing body structure, function, activity limitation and / or participation in recreation  ;Presentation MEDIUM Complexity : Evolving with changing characteristics  ; Clinical Decision Making MEDIUM Complexity : FOTO score of 26-74  Overall Complexity Rating: MEDIUM    Problem List: pain affecting function, decrease ROM, decrease strength, edema affecting function, impaired gait/ balance, decrease ADL/ functional abilitiies, decrease activity tolerance and decrease flexibility/ joint mobility   Treatment Plan may include any combination of the following: Therapeutic exercise, Therapeutic activities, Neuromuscular re-education, Physical agent/modality, Gait/balance training, Manual therapy, Patient education, Functional mobility training, Home safety training and Stair training  Patient / Family readiness to learn indicated by: asking questions, trying to perform skills and interest  Persons(s) to be included in education: patient (P)  Barriers to Learning/Limitations: None  Patient Goal (s): get back to normal, use of R foot  Patient Self Reported Health Status: excellent  Rehabilitation Potential: excellent    Short Term Goals: To be accomplished in 12 treatments:  Pt will be independent in HEP in order to maintain gains made throughout PT episode. Pt will demonstrate full R ankle/foot AROM without symptoms in prep for functional activities. Long Term Goals: To be accomplished in 24 treatments:  Pt will demonstrate ability to ambulate with normal gait mechanics without increased symptoms for at least 1 mile in order to allow for community activities. Pt will demonstrate R ankle strength 5/5 strength all planes in order to decrease risk of further injury. Pt will report no difficulty with heavy activity around home in order to allow for ADLs. Frequency / Duration: Patient to be seen 1-2 times per week for 24 treatments. Patient/ Caregiver education and instruction: activity modification and brace/ splint application    [x]  Plan of care has been reviewed with PTA        Certification Period: 90 days  Cecilia Atkins , PT, DPT, OCS, CMTPT 7/21/2020     ________________________________________________________________________    I certify that the above Therapy Services are being furnished while the patient is under my care. I agree with the treatment plan and certify that this therapy is necessary.     [de-identified] Signature:____________________  Date:____________Time: _________

## 2020-07-21 NOTE — PROGRESS NOTES
PT INITIAL EVALUATION NOTE - [facility] 2-15    Patient Name: Raffi Vo  Date:2020  : 1955  [x]  Patient  Verified  Payor: Thalia Qiu / Plan: [facility] HU[employer] MEDICARE CHOICE PPO/PFFS / Product Type: Managed Care Medicare /    In time:11:45 am  Out time:12:35 pm  Total Treatment Time (min): 50  Total Timed Codes (min): 0  1:1 Treatment Time ( [address] only): 50   Visit #: 1     Treatment Area: Right foot pain [M79.671]    SUBJECTIVE  Pain Level (0-10 scale): 0/10  Any medication changes, allergies to medications, adverse drug reactions, diagnosis change, or new procedure performed?: [] No    [x] Yes (see summary sheet for update)  Subjective:    R foot surgery: removed bunion, put plate in, fixed tailors bunion lateral foot, lengthened Achilles. DOS: 2020. Pt states recovery is going really well. Last f/u with MD 2020. Was on scooter x2 weeks, then boot. WBAT. Uses walker at night when she is not wearing boot. SPC as needed for long distances. Sits in shower. C/o numness/tingling medial > lateral foot. PLOF: no AD.     Mechanism of Injury: s/p R foot surgery  Previous Treatment/Compliance: previous PT for L shoulder and L ankle fracture, went well  PMHx/Surgical Hx: depression, thyroid problems  Work Hx: not currently working  Living Situation: lives with , 15 stairs in home   Pt Goals: get foot back to normal, full use  Barriers: none  Motivation: excellent  Substance use: none  FABQ Score: n/a  Cognition: A & O x 3        OBJECTIVE/EXAMINATION  Posture: R ankle inversion    Gait and Functional Mobility: ambulates with walking boot   Palpation: ttp, thickened R achilles   Swelling: fig 8 at ankle: R 48 cm L 45 cm  Joint Mobility: NT    Lower Extremity AROM:        R  L       Ankle DF  5  Ankle PF                     55  Ankle INV  20  Ankle EVR   5     LOWER QUARTER   MUSCLE STRENGTH  KEY       R  L  0 - No Contraction  L1, L2 Psoas  B LE's grossly 5/5 except:    1 - Trace   L3 Quads      2 - Poor   L4 Tib Ant  4/5    3 - Fair    L5 EHL  <3/5  -    4 - Good   S1 FHL  <3/5    5 - Normal   S2 Hams              MMT: See above  Hip abduction R 3+/5 L 4/5  Gluts 3+/5 B  HS 5/5 B    Ankle DF  R 4/5  Ankle PF  R 4/5  Ankle INV  R 4/5  Ankle EVR R 4/5  Neurological: Sensation : decreased L4 on the R   Special Tests: NT    Other Objective/Functional Measures: discussed getting out of boot while NWB as much as possible and performing ankle/foot AROM in order to promote increased ROM/strength, pt demonstrates understanding     Pain Level (0-10 scale) post treatment: 0/10    ASSESSMENT/Changes in Function:     [x]  See Plan of Care      Luiz Mclain , PT, DPT, OCS, CMTPT 7/21/2020

## 2020-07-27 ENCOUNTER — HOSPITAL ENCOUNTER (OUTPATIENT)
Dept: PHYSICAL THERAPY | Age: 65
Discharge: HOME | End: 2020-07-27
Payer: MEDICARE

## 2020-07-27 PROCEDURE — 97110 THERAPEUTIC EXERCISES: CPT

## 2020-07-27 PROCEDURE — 97140 MANUAL THERAPY 1/> REGIONS: CPT

## 2020-07-27 PROCEDURE — 97016 VASOPNEUMATIC DEVICE THERAPY: CPT

## 2020-07-27 NOTE — PROGRESS NOTES
PT DAILY TREATMENT NOTE - [facility] 2-15    Patient Name: Harpreet Hernandez  Date:2020  : 1955  [x]  Patient  Verified  Payor: Bassem Agudelo / Plan: [facility] MEDICARE CHOICE PPO/PFFS / Product Type: Managed Care Medicare /    In time:1:30 pm  Out time:2:25 pm  Total Treatment Time (min): 55  Total Timed Codes (min): 40  1:1 Treatment Time ( only): 40   Visit #:  2    Treatment Area: Right foot pain [M79.671]    SUBJECTIVE  Pain Level (0-10 scale): 0/10  Any medication changes, allergies to medications, adverse drug reactions, diagnosis change, or new procedure performed?: [x] No    [] Yes (see summary sheet for update)  Subjective functional status/changes:   [] No changes reported  Saw MD on Thursday, appointment went well. Pt is to slowly wean out of walking boot. MD would like us to focus on Achilles and regaining plantarflexion ROM, per pt.      OBJECTIVE    Modality rationale: decrease edema to improve the patients ability to ambulate and perform ADL's   Min Type Additional Details       [] Estim: []Att   []Unatt    []TENS instruct                  []IFC  []Premod   []NMES                     []Other:  []w/US   []w/ice   []w/heat  Position:  Location:       []  Traction: [] Cervical       []Lumbar                       [] Prone          []Supine                       []Intermittent   []Continuous Lbs:  [] before manual  [] after manual  []w/heat    []  Ultrasound: []Continuous   [] Pulsed                       at: []1MHz   []3MHz Location:  W/cm2:    [] Paraffin         Location:   []w/heat    []  Ice     []  Heat  []  Ice massage Position:  Location:    []  Laser  []  Other: Position:  Location:     15 [x]  Vasopneumatic Device Pressure:       [x] lo [] med [] hi   Temperature: 34     [x] Skin assessment post-treatment:  [x]intact []redness- no adverse reaction    []redness  adverse reaction:     30 min Therapeutic Exercise:  [x] See flow sheet :   Rationale: increase ROM and increase strength to improve the patients ability to ambulate and perform ADL's    10 min Manual Therapy: STM/MFR R gastroc/soleus, PROM R ankle all planes    Rationale: increase ROM, increase tissue extensibility and decrease trigger points to improve the patients ability to ambulate and perform ADL's          With   [x] TE   [] TA   [] neuro   [] other: Patient Education: [x] Review HEP    [] Progressed/Changed HEP based on:   [] positioning   [] body mechanics   [] transfers   [] heat/ice application    [] other:      Other Objective/Functional Measures: Moderate deficit great toe extension, p! Pain Level (0-10 scale) post treatment: 0/10    ASSESSMENT/Changes in Function:   Tolerated treatment session well without c/o increased discomfort. Discussed proper gait mechanics with weaning out of boot, pt demonstrates understanding. Patient will continue to benefit from skilled PT services to modify and progress therapeutic interventions, address functional mobility deficits, address ROM deficits, address strength deficits, analyze and address soft tissue restrictions, analyze and cue movement patterns and analyze and modify body mechanics/ergonomics to attain remaining goals. []  See Plan of Care  []  See progress note/recertification  []  See Discharge Summary         Progress towards goals / Updated goals:  Pt demonstrates knowledge of HEP.     PLAN  [x]  Upgrade activities as tolerated     [x]  Continue plan of care  [x]  Update interventions per flow sheet       []  Discharge due to:_  []  Other:_      Lencho Steel , PT, DPT, OCS, CMTPT 7/27/2020

## 2020-07-31 ENCOUNTER — HOSPITAL ENCOUNTER (OUTPATIENT)
Dept: PHYSICAL THERAPY | Age: 65
Discharge: HOME | End: 2020-07-31
Payer: MEDICARE

## 2020-07-31 PROCEDURE — 97140 MANUAL THERAPY 1/> REGIONS: CPT

## 2020-07-31 PROCEDURE — 97016 VASOPNEUMATIC DEVICE THERAPY: CPT

## 2020-07-31 PROCEDURE — 97110 THERAPEUTIC EXERCISES: CPT

## 2020-07-31 NOTE — PROGRESS NOTES
PT DAILY TREATMENT NOTE - [facility] 2-15    Patient Name: Bryan Post  Date:2020  : 1955  [x]  Patient  Verified  Payor: Lisette Buerger / Plan: [facility] HU[employer] MEDICARE CHOICE PPO/PFFS / Product Type: Managed Care Medicare /    In time:12:35 pm  Out time:1:35 pm  Total Treatment Time (min): 60  Total Timed Codes (min): 50  1:1 Treatment Time ([facility] only): 50   Visit #:  3    Treatment Area: Right foot pain [M79.671]    SUBJECTIVE  Pain Level (0-10 scale): 0/10  Any medication changes, allergies to medications, adverse drug reactions, diagnosis change, or new procedure performed?: [x] No    [] Yes (see summary sheet for update)  Subjective functional status/changes:   [] No changes reported  Reports compliance with HEP without difficulty.      OBJECTIVE    Modality rationale: decrease edema to improve the patients ability to ambulate and perform ADL's   Min Type Additional Details       [] Estim: []Att   []Unatt    []TENS instruct                  []IFC  []Premod   []NMES                     []Other:  []w/US   []w/ice   []w/heat  Position:  Location:       []  Traction: [] Cervical       []Lumbar                       [] Prone          []Supine                       []Intermittent   []Continuous Lbs:  [] before manual  [] after manual  []w/heat    []  Ultrasound: []Continuous   [] Pulsed                       at: []1MHz   []3MHz Location:  W/cm2:    [] Paraffin         Location:   []w/heat    []  Ice     []  Heat  []  Ice massage Position:  Location:    []  Laser  []  Other: Position:  Location:     10 [x]  Vasopneumatic Device Pressure:       [x] lo [] med [] hi   Temperature: 34     [x] Skin assessment post-treatment:  [x]intact []redness- no adverse reaction    []redness  adverse reaction:     40 min Therapeutic Exercise:  [x] See flow sheet :   Rationale: increase ROM and increase strength to improve the patients ability to ambulate and perform ADL's    10 min Manual Therapy: STM/MFR R gastroc/soleus, Post TC mobs, grade ll/lll PROM R ankle all planes    Rationale: increase ROM, increase tissue extensibility and decrease trigger points to improve the patients ability to ambulate and perform ADL's          With   [x] TE   [] TA   [] neuro   [] other: Patient Education: [x] Review HEP    [] Progressed/Changed HEP based on:   [] positioning   [] body mechanics   [] transfers   [] heat/ice application    [] other:      Other Objective/Functional Measures: Hypomobility with posterior TC mobs  SL balance: R unable  L 30\"     Pain Level (0-10 scale) post treatment: 0/10    ASSESSMENT/Changes in Function:   Tolerated treatment session well without c/o increased discomfort. Patient will continue to benefit from skilled PT services to modify and progress therapeutic interventions, address functional mobility deficits, address ROM deficits, address strength deficits, analyze and address soft tissue restrictions, analyze and cue movement patterns and analyze and modify body mechanics/ergonomics to attain remaining goals. []  See Plan of Care  []  See progress note/recertification  []  See Discharge Summary         Progress towards goals / Updated goals:  Gradually progressing with therex.     PLAN  [x]  Upgrade activities as tolerated     [x]  Continue plan of care  [x]  Update interventions per flow sheet       []  Discharge due to:_  []  Other:_      Caio Cardoza , PT, DPT, OCS, CMTPT 7/31/2020

## 2020-08-03 ENCOUNTER — HOSPITAL ENCOUNTER (OUTPATIENT)
Dept: PHYSICAL THERAPY | Age: 65
Discharge: HOME | End: 2020-08-03
Payer: MEDICARE

## 2020-08-03 PROCEDURE — 97016 VASOPNEUMATIC DEVICE THERAPY: CPT

## 2020-08-03 PROCEDURE — 97140 MANUAL THERAPY 1/> REGIONS: CPT

## 2020-08-03 PROCEDURE — 97110 THERAPEUTIC EXERCISES: CPT

## 2020-08-03 NOTE — PROGRESS NOTES
PT DAILY TREATMENT NOTE - [facility] 2-15    Patient Name: Lydia Masters  Date:8/3/2020  : 1955  [x]  Patient  Verified  Payor: Matthew Hearing / Plan: [facility] HU[employer] MEDICARE CHOICE PPO/PFFS / Product Type: Managed Care Medicare /    In time:1:30 pm  Out time: 2:30 pm  Total Treatment Time (min): 60  Total Timed Codes (min): 45  1:1 Treatment Time ( [address] only): 39   Visit #:  4    Treatment Area: Right foot pain [M79.671]    SUBJECTIVE  Pain Level (0-10 scale): 1/10  Any medication changes, allergies to medications, adverse drug reactions, diagnosis change, or new procedure performed?: [x] No    [] Yes (see summary sheet for update)  Subjective functional status/changes:   [] No changes reported  Overall feeling good, but sometimes feels \"twinges\" in area of bunion, and front of ankle.      OBJECTIVE    Modality rationale: decrease edema to improve the patients ability to ambulate and perform ADL's   Min Type Additional Details       [] Estim: []Att   []Unatt    []TENS instruct                  []IFC  []Premod   []NMES                     []Other:  []w/US   []w/ice   []w/heat  Position:  Location:       []  Traction: [] Cervical       []Lumbar                       [] Prone          []Supine                       []Intermittent   []Continuous Lbs:  [] before manual  [] after manual  []w/heat    []  Ultrasound: []Continuous   [] Pulsed                       at: []1MHz   []3MHz Location:  W/cm2:    [] Paraffin         Location:   []w/heat    []  Ice     []  Heat  []  Ice massage Position:  Location:    []  Laser  []  Other: Position:  Location:     15 [x]  Vasopneumatic Device Pressure:       [x] lo [] med [] hi   Temperature: 34     [x] Skin assessment post-treatment:  [x]intact []redness- no adverse reaction    []redness  adverse reaction:     30 min Therapeutic Exercise:  [x] See flow sheet :   Rationale: increase ROM and increase strength to improve the patients ability to ambulate and perform ADL's    15 min Manual Therapy: STM/MFR R gastroc/soleus, Post TC mobs, grade ll/lll PROM R ankle all planes. PROM great toe flexion, extension, ABD. Rationale: increase ROM, increase tissue extensibility and decrease trigger points to improve the patients ability to ambulate and perform ADL's          With   [x] TE   [] TA   [] neuro   [] other: Patient Education: [x] Review HEP    [] Progressed/Changed HEP based on:   [] positioning   [] body mechanics   [] transfers   [] heat/ice application    [] other:      Other Objective/Functional Measures: Hypomobility/p! with posterior TC mobs      Pain Level (0-10 scale) post treatment: 0/10    ASSESSMENT/Changes in Function:   Requires cues with ankle t-band to avoid hip rotation. Tolerated treatment session well without c/o increased discomfort. Patient will continue to benefit from skilled PT services to modify and progress therapeutic interventions, address functional mobility deficits, address ROM deficits, address strength deficits, analyze and address soft tissue restrictions, analyze and cue movement patterns and analyze and modify body mechanics/ergonomics to attain remaining goals. []  See Plan of Care  []  See progress note/recertification  []  See Discharge Summary         Progress towards goals / Updated goals:  Gradually progressing towards goals.     PLAN  [x]  Upgrade activities as tolerated     [x]  Continue plan of care  [x]  Update interventions per flow sheet       []  Discharge due to:_  []  Other:_      Miya Power , PT, DPT, OCS, CMTPT 8/3/2020

## 2020-08-07 ENCOUNTER — HOSPITAL ENCOUNTER (OUTPATIENT)
Dept: PHYSICAL THERAPY | Age: 65
Discharge: HOME | End: 2020-08-07
Payer: MEDICARE

## 2020-08-07 PROCEDURE — 97016 VASOPNEUMATIC DEVICE THERAPY: CPT

## 2020-08-07 PROCEDURE — 97140 MANUAL THERAPY 1/> REGIONS: CPT

## 2020-08-07 PROCEDURE — 97110 THERAPEUTIC EXERCISES: CPT

## 2020-08-07 NOTE — PROGRESS NOTES
PT DAILY TREATMENT NOTE - [facility] 2-15    Patient Name: Karma Machuca  Date:2020  : 1955  [x]  Patient  Verified  Payor: Marshal Beauchamp / Plan: [facility] HU[employer]NA MEDICARE CHOICE PPO/PFFS / Product Type: Managed Care Medicare /    In time:11:30a  Out time:12:30p  Total Treatment Time (min): 60  Total Timed Codes (min): 45  1:1 Treatment Time ( [address] only): 39   Visit #:  5    Treatment Area: Right foot pain [M79.671]    SUBJECTIVE  Pain Level (0-10 scale): 0/10  Any medication changes, allergies to medications, adverse drug reactions, diagnosis change, or new procedure performed?: [x] No    [] Yes (see summary sheet for update)  Subjective functional status/changes:   [] No changes reported  Patient reports she has been feeling good with no pain since last visit.      OBJECTIVE    Modality rationale: decrease edema to improve the patients ability to ambulate and perform ADL's   Min Type Additional Details       [] Estim: []Att   []Unatt    []TENS instruct                  []IFC  []Premod   []NMES                     []Other:  []w/US   []w/ice   []w/heat  Position:  Location:       []  Traction: [] Cervical       []Lumbar                       [] Prone          []Supine                       []Intermittent   []Continuous Lbs:  [] before manual  [] after manual  []w/heat    []  Ultrasound: []Continuous   [] Pulsed                       at: []1MHz   []3MHz Location:  W/cm2:    [] Paraffin         Location:   []w/heat    []  Ice     []  Heat  []  Ice massage Position:  Location:    []  Laser  []  Other: Position:  Location:     15 [x]  Vasopneumatic Device Pressure:       [x] lo [] med [] hi   Temperature: 34     [x] Skin assessment post-treatment:  [x]intact []redness- no adverse reaction    []redness  adverse reaction:     30 min Therapeutic Exercise:  [x] See flow sheet :   Rationale: increase ROM and increase strength to improve the patients ability to ambulate and perform ADL's    15 min Manual Therapy: STM/MFR R gastroc/soleus, Post TC mobs, grade ll/lll PROM R ankle all planes. PROM great toe flexion, extension, ABD. Rationale: increase ROM, increase tissue extensibility and decrease trigger points to improve the patients ability to ambulate and perform ADL's          With   [x] TE   [] TA   [] neuro   [] other: Patient Education: [x] Review HEP    [] Progressed/Changed HEP based on:   [] positioning   [] body mechanics   [] transfers   [] heat/ice application    [] other:      Other Objective/Functional Measures: no pain with manual today      Pain Level (0-10 scale) post treatment: 0/10    ASSESSMENT/Changes in Function:   Requires cues with ankle t-band to avoid hip rotation. Tolerated treatment session well without c/o increased discomfort. Patient will continue to benefit from skilled PT services to modify and progress therapeutic interventions, address functional mobility deficits, address ROM deficits, address strength deficits, analyze and address soft tissue restrictions, analyze and cue movement patterns and analyze and modify body mechanics/ergonomics to attain remaining goals. []  See Plan of Care  []  See progress note/recertification  []  See Discharge Summary         Progress towards goals / Updated goals:  Patient tolerated all standing exercises well with no increased pain throughout. Patient making steady progress towards goals.      PLAN  [x]  Upgrade activities as tolerated     [x]  Continue plan of care  [x]  Update interventions per flow sheet       []  Discharge due to:_  []  Other:_      Cate Aden , PTA 8/7/2020

## 2020-08-10 ENCOUNTER — HOSPITAL ENCOUNTER (OUTPATIENT)
Dept: PHYSICAL THERAPY | Age: 65
Discharge: HOME | End: 2020-08-10
Payer: MEDICARE

## 2020-08-10 PROCEDURE — 97140 MANUAL THERAPY 1/> REGIONS: CPT

## 2020-08-10 PROCEDURE — 97014 ELECTRIC STIMULATION THERAPY: CPT

## 2020-08-10 PROCEDURE — 97110 THERAPEUTIC EXERCISES: CPT

## 2020-08-10 NOTE — PROGRESS NOTES
PT DAILY TREATMENT NOTE - [facility] 2-15    Patient Name: Keri Rojas  Date:8/10/2020  : 1955  [x]  Patient  Verified  Payor: Cedric England / Plan: [facility] MEDICARE CHOICE PPO/PFFS / Product Type: Managed Care Medicare /    In time:10:45a  Out time:11:45a  Total Treatment Time (min): 60  Total Timed Codes (min): 45  1:1 Treatment Time ( [address] only): 39   Visit #:  6    Treatment Area: Right foot pain [M79.671]    SUBJECTIVE  Pain Level (0-10 scale): 0/10  Any medication changes, allergies to medications, adverse drug reactions, diagnosis change, or new procedure performed?: [x] No    [] Yes (see summary sheet for update)  Subjective functional status/changes:   [] No changes reported  Patient reports no issues after last visit and is doing really well.     OBJECTIVE    Modality rationale: decrease edema to improve the patients ability to ambulate and perform ADL's   Min Type Additional Details       [] Estim: []Att   []Unatt    []TENS instruct                  []IFC  []Premod   []NMES                     []Other:  []w/US   []w/ice   []w/heat  Position:  Location:       []  Traction: [] Cervical       []Lumbar                       [] Prone          []Supine                       []Intermittent   []Continuous Lbs:  [] before manual  [] after manual  []w/heat    []  Ultrasound: []Continuous   [] Pulsed                       at: []1MHz   []3MHz Location:  W/cm2:    [] Paraffin         Location:   []w/heat    []  Ice     []  Heat  []  Ice massage Position:  Location:    []  Laser  []  Other: Position:  Location:     15 [x]  Vasopneumatic Device Pressure:       [] lo [x] med [] hi   Temperature: 34     [x] Skin assessment post-treatment:  [x]intact []redness- no adverse reaction    []redness  adverse reaction:     35 min Therapeutic Exercise:  [x] See flow sheet :   Rationale: increase ROM and increase strength to improve the patients ability to ambulate and perform ADL's    10 min Manual Therapy: STM/MFR R gastroc/soleus, Post TC mobs, grade ll/lll PROM R ankle all planes. PROM great toe flexion, extension, ABD. Rationale: increase ROM, increase tissue extensibility and decrease trigger points to improve the patients ability to ambulate and perform ADL's          With   [x] TE   [] TA   [] neuro   [] other: Patient Education: [x] Review HEP    [] Progressed/Changed HEP based on:   [] positioning   [] body mechanics   [] transfers   [] heat/ice application    [] other:      Other Objective/Functional Measures: decreased edemea noted today, no pain with advanced exercises,    SLS: R 2-3 LOB, increased fatigue      Pain Level (0-10 scale) post treatment: 0/10    ASSESSMENT/Changes in Function:       Patient will continue to benefit from skilled PT services to modify and progress therapeutic interventions, address functional mobility deficits, address ROM deficits, address strength deficits, analyze and address soft tissue restrictions, analyze and cue movement patterns and analyze and modify body mechanics/ergonomics to attain remaining goals. []  See Plan of Care  []  See progress note/recertification  []  See Discharge Summary         Progress towards goals / Updated goals:  Patient tolerated all standing exercises well with no increased pain throughout. Patient making steady progress towards goals.      PLAN  [x]  Upgrade activities as tolerated     [x]  Continue plan of care  [x]  Update interventions per flow sheet       []  Discharge due to:_  []  Other:_      Claudine Gibbs , PTA 8/10/2020

## 2020-08-14 ENCOUNTER — HOSPITAL ENCOUNTER (OUTPATIENT)
Dept: PHYSICAL THERAPY | Age: 65
Discharge: HOME | End: 2020-08-14
Payer: MEDICARE

## 2020-08-14 PROCEDURE — 97110 THERAPEUTIC EXERCISES: CPT

## 2020-08-14 PROCEDURE — 97016 VASOPNEUMATIC DEVICE THERAPY: CPT

## 2020-08-14 PROCEDURE — 97140 MANUAL THERAPY 1/> REGIONS: CPT

## 2020-08-14 NOTE — PROGRESS NOTES
PT DAILY TREATMENT NOTE - [facility] 2-15    Patient Name: Bea Adan  Date:2020  : 1955  [x]  Patient  Verified  Payor: [facility] / Plan: [facility] [employer] MEDICARE CHOICE PPO/PFFS / Product Type: Managed Care Medicare /    In time:11:45a  Out time:12:45a  Total Treatment Time (min): 60  Total Timed Codes (min): 45  1:1 Treatment Time ( [address] only): 39   Visit #:  7    Treatment Area: Right foot pain [M79.671]    SUBJECTIVE  Pain Level (0-10 scale): 0/10  Any medication changes, allergies to medications, adverse drug reactions, diagnosis change, or new procedure performed?: [x] No    [] Yes (see summary sheet for update)  Subjective functional status/changes:   [] No changes reported  Patient reports no issues after last visit and is doing really well. Pain in R foot the day after last treatment session. Thinks she may have been tying shoe too tight. Feels better now. Also c/o feeling tightness across front of ankle.     OBJECTIVE    Modality rationale: decrease edema to improve the patients ability to ambulate and perform ADL's   Min Type Additional Details       [] Estim: []Att   []Unatt    []TENS instruct                  []IFC  []Premod   []NMES                     []Other:  []w/US   []w/ice   []w/heat  Position:  Location:       []  Traction: [] Cervical       []Lumbar                       [] Prone          []Supine                       []Intermittent   []Continuous Lbs:  [] before manual  [] after manual  []w/heat    []  Ultrasound: []Continuous   [] Pulsed                       at: []1MHz   []3MHz Location:  W/cm2:    [] Paraffin         Location:   []w/heat    []  Ice     []  Heat  []  Ice massage Position:  Location:    []  Laser  []  Other: Position:  Location:     15 [x]  Vasopneumatic Device Pressure:       [x] lo [] med [] hi   Temperature: 34     [x] Skin assessment post-treatment:  [x]intact []redness- no adverse reaction    []redness  adverse reaction:     35 min Therapeutic Exercise:  [x] See flow sheet :   Rationale: increase ROM and increase strength to improve the patients ability to ambulate and perform ADL's    10 min Manual Therapy:  Post TC mobs, grade ll/lll PROM R ankle all planes. Rationale: increase ROM, increase tissue extensibility and decrease trigger points to improve the patients ability to ambulate and perform ADL's          With   [x] TE   [] TA   [] neuro   [] other: Patient Education: [x] Review HEP    [] Progressed/Changed HEP based on:   [] positioning   [] body mechanics   [] transfers   [] heat/ice application    [] other:      Other Objective/Functional Measures:hypomobility/tenderness with posterior TC mobs    Pain Level (0-10 scale) post treatment: 0/10    ASSESSMENT/Changes in Function:   Anterior ankle symptoms likely secondary to joint hypomobility. Patient will continue to benefit from skilled PT services to modify and progress therapeutic interventions, address functional mobility deficits, address ROM deficits, address strength deficits, analyze and address soft tissue restrictions, analyze and cue movement patterns and analyze and modify body mechanics/ergonomics to attain remaining goals. []  See Plan of Care  []  See progress note/recertification  []  See Discharge Summary         Progress towards goals / Updated goals:  Gradually progressing with therex.       PLAN  [x]  Upgrade activities as tolerated     [x]  Continue plan of care  [x]  Update interventions per flow sheet       []  Discharge due to:_  []  Other:_      Mortimer Doll , PT, DPT, OCS, CMTPT 8/14/2020

## 2020-08-17 ENCOUNTER — HOSPITAL ENCOUNTER (OUTPATIENT)
Dept: PHYSICAL THERAPY | Age: 65
Discharge: HOME | End: 2020-08-17
Payer: MEDICARE

## 2020-08-17 PROCEDURE — 97016 VASOPNEUMATIC DEVICE THERAPY: CPT

## 2020-08-17 PROCEDURE — 97140 MANUAL THERAPY 1/> REGIONS: CPT

## 2020-08-17 PROCEDURE — 97110 THERAPEUTIC EXERCISES: CPT

## 2020-08-21 ENCOUNTER — HOSPITAL ENCOUNTER (OUTPATIENT)
Dept: PHYSICAL THERAPY | Age: 65
Discharge: HOME | End: 2020-08-21
Payer: MEDICARE

## 2020-08-21 PROCEDURE — 97110 THERAPEUTIC EXERCISES: CPT

## 2020-08-21 PROCEDURE — 97140 MANUAL THERAPY 1/> REGIONS: CPT

## 2020-08-21 PROCEDURE — 97016 VASOPNEUMATIC DEVICE THERAPY: CPT

## 2020-08-21 NOTE — PROGRESS NOTES
[facility]  [place]  [facility] [address], [address]  Phone: [phone]      Fax:  [phone]    Progress Note    Name: Anita Opitz   : 1955   MD: Jan Bonner DPM       Treatment Diagnosis: Right foot pain [M79.671]  Start of Care: 2020    Visits from Start of Care: 9  Missed Visits: 0    Summary of Care:Patient is s/p R foot surgery: fusion first tarsometatarsal joint, bunionectomy, tailors bunionectomy, and gastrocnemius resection. Overall patient is gradually progressing towards goals. Increased R ankle ROM, increased overall RLE strength, and decreased swelling at ankle measured today. Pain rated 0/10.     Swelling: fig 8 at ankle: R 46 cm L 45 cm     Lower Extremity AROM:                                                                    R                      L                                                            Ankle DF                    10   15  Ankle PF                     55       60  Ankle INV                    35                     40  Ankle EVR                  10            12    Great Toe   Extension  60  80  Flexion   -10  70      LOWER QUARTER                            MUSCLE STRENGTH  KEY                                                                             R                      L  0 - No Contraction                   L1, L2 Psoas               B LE's grossly 5/5 except:                    1 - Trace                                  L3 Quads                                              2 - Poor                                   L4 Tib Ant                    4+/5                     3 - Fair                                     L5 EHL                        3+/5                 -                         4 - Good                                  S1 FHL                        3+/5                   5 - Normal                               S2 Hams    MMT: See above  Hip abduction R 3+/5 L 4/5  Gluts 3+/5 B  HS 5/5 B     Ankle DF  R 4+/5  Ankle PF  R 4+/5  Ankle INV  R 4+/5 p! Ankle EVR R 4+/5    Assessment / Recommendations:     Short Term Goals: To be accomplished in 12 treatments:  Pt will be independent in HEP in order to maintain gains made throughout PT episode. MET  Pt will demonstrate full R ankle/foot AROM without symptoms in prep for functional activities. Making progress towards     Long Term Goals: To be accomplished in 24 treatments:  Pt will demonstrate ability to ambulate with normal gait mechanics without increased symptoms for at least 1 mile in order to allow for community activities. Making progress towards  Pt will demonstrate R ankle strength 5/5 strength all planes in order to decrease risk of further injury. Making progress towarards  Pt will report no difficulty with heavy activity around home in order to allow for ADLs. Making progress towards     Other: Continue PT 2x/week        Yvon Flair , PT, DPT, OCS, CMTPT 8/21/2020     ________________________________________________________________________  NOTE TO PHYSICIAN:  Please complete the following and fax to: [facility]: Fax: [phone]. Kimmie Davi Retain this original for your records. If you are unable to process this request in 24 hours, please contact our office.        ____ I have read the above report and request that my patient continue therapy with the following changes/special instructions:  ____ I have read the above report and request that my patient be discharged from therapy    Physician's Signature:_________________ Date:___________Time:__________

## 2020-08-21 NOTE — PROGRESS NOTES
PT DAILY TREATMENT NOTE - [facility] 2-15    Patient Name: Alyssa Null   Date:2020  : 1955  [x]  Patient  Verified  Payor: Kasey Westbrook / Plan: [facility] HUMANA MEDICARE CHOICE PPO/PFFS / Product Type: Managed Care Medicare /    In time:11:45 am  Out time:12:45  Total Treatment Time (min): 60  Total Timed Codes (min): 45  1:1 Treatment Time ([facility] only): 39   Visit #:  9    Treatment Area: Right foot pain [M79.671]    SUBJECTIVE  Pain Level (0-10 scale): 0/10  Any medication changes, allergies to medications, adverse drug reactions, diagnosis change, or new procedure performed?: [x] No    [] Yes (see summary sheet for update)  Subjective functional status/changes:   [] No changes reported  Had shoe off for most of the day yesterday, felt great. Thinks shoes are causing increased discomfort.      OBJECTIVE    Modality rationale: decrease inflammation and decrease pain to improve the patients ability to ambulate and perform ADL's including heavy housework   Min Type Additional Details       [] Estim: []Att   []Unatt    []TENS instruct                  []IFC  []Premod   []NMES                     []Other:  []w/US   []w/ice   []w/heat  Position:  Location:       []  Traction: [] Cervical       []Lumbar                       [] Prone          []Supine                       []Intermittent   []Continuous Lbs:  [] before manual  [] after manual  []w/heat    []  Ultrasound: []Continuous   [] Pulsed                       at: []1MHz   []3MHz Location:  W/cm2:    [] Paraffin         Location:   []w/heat    []  Ice     []  Heat  []  Ice massage Position:  Location:    []  Laser  []  Other: Position:  Location:   15   [x]  Vasopneumatic Device Pressure:       [x] lo [] med [] hi   Temperature: 34     [x] Skin assessment post-treatment:  [x]intact []redness- no adverse reaction    []redness  adverse reaction:     30 min Therapeutic Exercise:  [x] See flow sheet :   Rationale: increase ROM, increase strength, improve coordination, improve balance and increase proprioception to improve the patients ability to ambulate and perform ADL's including heavy housework    15 min Manual Therapy: first MTP distraction with PROM extension, flexion, ABD    Rationale: decrease pain, increase ROM and increase tissue extensibility to improve the patients ability to ambulate and perform ADL's including heavy housework            With   [x] TE   [] TA   [] neuro   [] other: Patient Education: [x] Review HEP    [] Progressed/Changed HEP based on:   [] positioning   [] body mechanics   [] transfers   [x] heat/ice application    [] other:      Other Objective/Functional Measures:     Swelling: fig 8 at ankle: R 46 cm L 45 cm     Lower Extremity AROM:                                                                    R                      L                                                            Ankle DF                    10   15  Ankle PF                     55       60  Ankle INV                    35                     40  Ankle EVR                  10            12    Great Toe   Extension  60  80  Flexion   -10  70      LOWER QUARTER                            MUSCLE STRENGTH  KEY                                                                             R                      L  0 - No Contraction                   L1, L2 Psoas               B LE's grossly 5/5 except:                    1 - Trace                                  L3 Quads                                              2 - Poor                                   L4 Tib Ant                    4+/5                     3 - Fair                                     L5 EHL                        3+/5                 -                         4 - Good                                  S1 FHL                        3+/5                   5 - Normal                               S2 Hams    MMT: See above  Hip abduction R 3+/5 L 4/5  Gluts 3+/5 B  HS 5/5 B     Ankle DF  R 4+/5  Ankle PF  R 4+/5  Ankle INV  R 4+/5 p! Ankle EVR R 4+/5    Pain Level (0-10 scale) post treatment: 0/10    ASSESSMENT/Changes in Function:     Patient will continue to benefit from skilled PT services to modify and progress therapeutic interventions, address functional mobility deficits, address ROM deficits, address strength deficits, analyze and address soft tissue restrictions, analyze and cue movement patterns and analyze and modify body mechanics/ergonomics to attain remaining goals. []  See Plan of Care  [x]  See progress note/recertification  []  See Discharge Summary         Progress towards goals / Updated goals:  Short Term Goals: To be accomplished in 12 treatments:  Pt will be independent in HEP in order to maintain gains made throughout PT episode. MET  Pt will demonstrate full R ankle/foot AROM without symptoms in prep for functional activities. Making progress towards     Long Term Goals: To be accomplished in 24 treatments:  Pt will demonstrate ability to ambulate with normal gait mechanics without increased symptoms for at least 1 mile in order to allow for community activities. Making progress towards  Pt will demonstrate R ankle strength 5/5 strength all planes in order to decrease risk of further injury. Making progress towarards  Pt will report no difficulty with heavy activity around home in order to allow for ADLs.  Making progress towards     PLAN  [x]  Upgrade activities as tolerated     [x]  Continue plan of care  [x]  Update interventions per flow sheet       []  Discharge due to:_  []  Other:_      Lencho Steel , PT, DPT, OCS, CMTPT 8/21/2020

## 2020-08-24 ENCOUNTER — HOSPITAL ENCOUNTER (OUTPATIENT)
Dept: PHYSICAL THERAPY | Age: 65
Discharge: HOME | End: 2020-08-24
Payer: MEDICARE

## 2020-08-24 PROCEDURE — 97140 MANUAL THERAPY 1/> REGIONS: CPT

## 2020-08-24 PROCEDURE — 97016 VASOPNEUMATIC DEVICE THERAPY: CPT

## 2020-08-24 PROCEDURE — 97110 THERAPEUTIC EXERCISES: CPT

## 2020-08-24 NOTE — PROGRESS NOTES
PT DAILY TREATMENT NOTE - [facility] 2-15    Patient Name: Anita Opitz   Date:2020  : 1955  [x]  Patient  Verified  Payor: Heaven White / Plan: [facility] HUMANA MEDICARE CHOICE PPO/PFFS / Product Type: Managed Care Medicare /    In time:11:45 am  Out time:12:45  Total Treatment Time (min): 60  Total Timed Codes (min): 45  1:1 Treatment Time ( [address] only): 39   Visit #:  10    Treatment Area: Right foot pain [M79.671]    SUBJECTIVE  Pain Level (0-10 scale): 0/10  Any medication changes, allergies to medications, adverse drug reactions, diagnosis change, or new procedure performed?: [x] No    [] Yes (see summary sheet for update)  Subjective functional status/changes:   [] No changes reported  Felt good after last treatment session. A little uncomfortable with shoe on today, but not painful. Ordered another night splint to help with toe ROM.     OBJECTIVE    Modality rationale: decrease inflammation and decrease pain to improve the patients ability to ambulate and perform ADL's including heavy housework   Min Type Additional Details       [] Estim: []Att   []Unatt    []TENS instruct                  []IFC  []Premod   []NMES                     []Other:  []w/US   []w/ice   []w/heat  Position:  Location:       []  Traction: [] Cervical       []Lumbar                       [] Prone          []Supine                       []Intermittent   []Continuous Lbs:  [] before manual  [] after manual  []w/heat    []  Ultrasound: []Continuous   [] Pulsed                       at: []1MHz   []3MHz Location:  W/cm2:    [] Paraffin         Location:   []w/heat    []  Ice     []  Heat  []  Ice massage Position:  Location:    []  Laser  []  Other: Position:  Location:   15   [x]  Vasopneumatic Device Pressure:       [x] lo [] med [] hi   Temperature: 34     [x] Skin assessment post-treatment:  [x]intact []redness- no adverse reaction    []redness  adverse reaction:     30 min Therapeutic Exercise:  [x] See flow sheet : Rationale: increase ROM, increase strength, improve coordination, improve balance and increase proprioception to improve the patients ability to ambulate and perform ADL's including heavy housework    15 min Manual Therapy: first MTP distraction with PROM extension, flexion, ABD. Post TC mobs with PROM DF. TC distraction with PROM PF. Rationale: decrease pain, increase ROM and increase tissue extensibility to improve the patients ability to ambulate and perform ADL's including heavy housework            With   [x] TE   [] TA   [] neuro   [] other: Patient Education: [x] Review HEP    [] Progressed/Changed HEP based on:   [] positioning   [] body mechanics   [] transfers   [x] heat/ice application    [] other:      Other Objective/Functional Measures: Requires cues with squat to decrease genu valgus on the R     Pain Level (0-10 scale) post treatment: 0/10    ASSESSMENT/Changes in Function:     Patient will continue to benefit from skilled PT services to modify and progress therapeutic interventions, address functional mobility deficits, address ROM deficits, address strength deficits, analyze and address soft tissue restrictions, analyze and cue movement patterns and analyze and modify body mechanics/ergonomics to attain remaining goals. []  See Plan of Care  [x]  See progress note/recertification  []  See Discharge Summary         Progress towards goals / Updated goals:  Short Term Goals: To be accomplished in 12 treatments:  Pt will be independent in HEP in order to maintain gains made throughout PT episode. MET  Pt will demonstrate full R ankle/foot AROM without symptoms in prep for functional activities. Making progress towards     Long Term Goals: To be accomplished in 24 treatments:  Pt will demonstrate ability to ambulate with normal gait mechanics without increased symptoms for at least 1 mile in order to allow for community activities.  Making progress towards  Pt will demonstrate R ankle strength 5/5 strength all planes in order to decrease risk of further injury. Making progress jean  Pt will report no difficulty with heavy activity around home in order to allow for ADLs.  Making progress towards     PLAN  [x]  Upgrade activities as tolerated     [x]  Continue plan of care  [x]  Update interventions per flow sheet       []  Discharge due to:_  []  Other:_pt to see MD 9/2/2020      Malaika Vo PT, DPT, OCS, CMTPT 8/24/2020

## 2020-08-27 ENCOUNTER — HOSPITAL ENCOUNTER (OUTPATIENT)
Dept: PHYSICAL THERAPY | Age: 65
Discharge: HOME | End: 2020-08-27
Payer: MEDICARE

## 2020-08-27 PROCEDURE — 97140 MANUAL THERAPY 1/> REGIONS: CPT

## 2020-08-27 PROCEDURE — 97016 VASOPNEUMATIC DEVICE THERAPY: CPT

## 2020-08-27 PROCEDURE — 97110 THERAPEUTIC EXERCISES: CPT

## 2020-08-27 NOTE — PROGRESS NOTES
PT DAILY TREATMENT NOTE - [facility] 2-15    Patient Name: Stephanie Delgado   Date:2020  : 1955  [x]  Patient  Verified  Payor: Carlie Coats / Plan: [facility] HUMANA MEDICARE CHOICE PPO/PFFS / Product Type: Managed Care Medicare /    In time:4:10p  Out time: 5:10p  Total Treatment Time (min): 60  Total Timed Codes (min): 45  1:1 Treatment Time ([facility] [place] only): 39   Visit #:  11    Treatment Area: Right foot pain [M79.671]    SUBJECTIVE  Pain Level (0-10 scale): 0/10  Any medication changes, allergies to medications, adverse drug reactions, diagnosis change, or new procedure performed?: [x] No    [] Yes (see summary sheet for update)  Subjective functional status/changes:   [] No changes reported  Patient reports she got a new splint yesterday. Patient reports her shoes are feeling good today.      OBJECTIVE    Modality rationale: decrease inflammation and decrease pain to improve the patients ability to ambulate and perform ADL's including heavy housework   Min Type Additional Details       [] Estim: []Att   []Unatt    []TENS instruct                  []IFC  []Premod   []NMES                     []Other:  []w/US   []w/ice   []w/heat  Position:  Location:       []  Traction: [] Cervical       []Lumbar                       [] Prone          []Supine                       []Intermittent   []Continuous Lbs:  [] before manual  [] after manual  []w/heat    []  Ultrasound: []Continuous   [] Pulsed                       at: []1MHz   []3MHz Location:  W/cm2:    [] Paraffin         Location:   []w/heat    []  Ice     []  Heat  []  Ice massage Position:  Location:    []  Laser  []  Other: Position:  Location:   15   [x]  Vasopneumatic Device Pressure:       [x] lo [] med [] hi   Temperature: 34     [x] Skin assessment post-treatment:  [x]intact []redness- no adverse reaction    []redness  adverse reaction:     30 min Therapeutic Exercise:  [x] See flow sheet :   Rationale: increase ROM, increase strength, improve coordination, improve balance and increase proprioception to improve the patients ability to ambulate and perform ADL's including heavy housework    15 min Manual Therapy: first MTP distraction with PROM extension, flexion, ABD. Post TC mobs with PROM DF. TC distraction with PROM PF. Rationale: decrease pain, increase ROM and increase tissue extensibility to improve the patients ability to ambulate and perform ADL's including heavy housework            With   [x] TE   [] TA   [] neuro   [] other: Patient Education: [x] Review HEP    [] Progressed/Changed HEP based on:   [] positioning   [] body mechanics   [] transfers   [x] heat/ice application    [] other:      Other Objective/Functional Measures: Requires cues with squat to decrease genu valgus on the R     Pain Level (0-10 scale) post treatment: 0/10    ASSESSMENT/Changes in Function:     Patient will continue to benefit from skilled PT services to modify and progress therapeutic interventions, address functional mobility deficits, address ROM deficits, address strength deficits, analyze and address soft tissue restrictions, analyze and cue movement patterns and analyze and modify body mechanics/ergonomics to attain remaining goals. []  See Plan of Care  []  See progress note/recertification  []  See Discharge Summary         Progress towards goals / Updated goals: Patient making steady progress towards goals with improved standing and balance tolerance note. Short Term Goals: To be accomplished in 12 treatments:  Pt will be independent in HEP in order to maintain gains made throughout PT episode. MET  Pt will demonstrate full R ankle/foot AROM without symptoms in prep for functional activities. Making progress towards     Long Term Goals: To be accomplished in 24 treatments:  Pt will demonstrate ability to ambulate with normal gait mechanics without increased symptoms for at least 1 mile in order to allow for community activities.  Making progress towards  Pt will demonstrate R ankle strength 5/5 strength all planes in order to decrease risk of further injury. Making progress jean  Pt will report no difficulty with heavy activity around home in order to allow for ADLs.  Making progress towards     PLAN  [x]  Upgrade activities as tolerated     [x]  Continue plan of care  [x]  Update interventions per flow sheet       []  Discharge due to:_  []  Other:_pt to see MD 9/2/2020      Kris Álvarez , PTA 8/27/2020

## 2020-08-28 ENCOUNTER — APPOINTMENT (OUTPATIENT)
Dept: PHYSICAL THERAPY | Age: 65
End: 2020-08-28
Payer: MEDICARE

## 2020-08-31 ENCOUNTER — HOSPITAL ENCOUNTER (OUTPATIENT)
Dept: PHYSICAL THERAPY | Age: 65
Discharge: HOME | End: 2020-08-31
Payer: MEDICARE

## 2020-08-31 PROCEDURE — 97140 MANUAL THERAPY 1/> REGIONS: CPT

## 2020-08-31 PROCEDURE — 97016 VASOPNEUMATIC DEVICE THERAPY: CPT

## 2020-08-31 PROCEDURE — 97110 THERAPEUTIC EXERCISES: CPT

## 2020-08-31 NOTE — PROGRESS NOTES
PT DAILY TREATMENT NOTE - [facility] 2-15    Patient Name: Yandel Hoskins   Date:2020  : 1955  [x]  Patient  Verified  Payor: Cindy Fuller / Plan: [facility] MEDICARE CHOICE PPO/PFFS / Product Type: Managed Care Medicare /    In time:11:30a  Out time: 12:25a  Total Treatment Time (min): 55  Total Timed Codes (min): 40  1:1 Treatment Time ( only): 40   Visit #:  12    Treatment Area: Right foot pain [M79.857]    SUBJECTIVE  Pain Level (0-10 scale): 0/10  Any medication changes, allergies to medications, adverse drug reactions, diagnosis change, or new procedure performed?: [x] No    [] Yes (see summary sheet for update)  Subjective functional status/changes:   [] No changes reported  Patient reports she has been walking and standing more all weekend and has not had any issues. Patient states she sees her MD on Wednesday.     OBJECTIVE    Modality rationale: decrease inflammation and decrease pain to improve the patients ability to ambulate and perform ADL's including heavy housework   Min Type Additional Details       [] Estim: []Att   []Unatt    []TENS instruct                  []IFC  []Premod   []NMES                     []Other:  []w/US   []w/ice   []w/heat  Position:  Location:       []  Traction: [] Cervical       []Lumbar                       [] Prone          []Supine                       []Intermittent   []Continuous Lbs:  [] before manual  [] after manual  []w/heat    []  Ultrasound: []Continuous   [] Pulsed                       at: []1MHz   []3MHz Location:  W/cm2:    [] Paraffin         Location:   []w/heat    []  Ice     []  Heat  []  Ice massage Position:  Location:    []  Laser  []  Other: Position:  Location:   15   [x]  Vasopneumatic Device Pressure:       [x] lo [] med [] hi   Temperature: 34     [x] Skin assessment post-treatment:  [x]intact []redness- no adverse reaction    []redness  adverse reaction:     30 min Therapeutic Exercise:  [x] See flow sheet :   Rationale: increase ROM, increase strength, improve coordination, improve balance and increase proprioception to improve the patients ability to ambulate and perform ADL's including heavy housework    10 min Manual Therapy: first MTP distraction with PROM extension, flexion, ABD. Post TC mobs with PROM DF. TC distraction with PROM PF. Rationale: decrease pain, increase ROM and increase tissue extensibility to improve the patients ability to ambulate and perform ADL's including heavy housework            With   [x] TE   [] TA   [] neuro   [] other: Patient Education: [x] Review HEP    [] Progressed/Changed HEP based on:   [] positioning   [] body mechanics   [] transfers   [x] heat/ice application    [] other:      Other Objective/Functional Measures: improved form with squats    Pain Level (0-10 scale) post treatment: 0/10    ASSESSMENT/Changes in Function:     Patient will continue to benefit from skilled PT services to modify and progress therapeutic interventions, address functional mobility deficits, address ROM deficits, address strength deficits, analyze and address soft tissue restrictions, analyze and cue movement patterns and analyze and modify body mechanics/ergonomics to attain remaining goals. []  See Plan of Care  []  See progress note/recertification  []  See Discharge Summary         Progress towards goals / Updated goals: Patient has been able to increased her standing tolerance at home with no issues. Patient making great progress towards goals. Short Term Goals: To be accomplished in 12 treatments:  Pt will be independent in HEP in order to maintain gains made throughout PT episode. MET  Pt will demonstrate full R ankle/foot AROM without symptoms in prep for functional activities. Making progress towards     Long Term Goals:  To be accomplished in 24 treatments:  Pt will demonstrate ability to ambulate with normal gait mechanics without increased symptoms for at least 1 mile in order to allow for community activities. Met  Pt will demonstrate R ankle strength 5/5 strength all planes in order to decrease risk of further injury. Making progress jean  Pt will report no difficulty with heavy activity around home in order to allow for ADLs.  Met    PLAN  [x]  Upgrade activities as tolerated     [x]  Continue plan of care  [x]  Update interventions per flow sheet       []  Discharge due to:_  []  Other:_pt to see MD 9/2/2020      Varsha Morales PTA 8/31/2020

## 2020-09-04 ENCOUNTER — HOSPITAL ENCOUNTER (OUTPATIENT)
Dept: PHYSICAL THERAPY | Age: 65
Discharge: HOME | End: 2020-09-04
Payer: MEDICARE

## 2020-09-04 PROCEDURE — 97110 THERAPEUTIC EXERCISES: CPT

## 2020-09-04 PROCEDURE — 97016 VASOPNEUMATIC DEVICE THERAPY: CPT

## 2020-09-04 NOTE — PROGRESS NOTES
PT DAILY TREATMENT NOTE - [facility] 2-15    Patient Name: Asiya Watts   Date:2020  : 1955  [x]  Patient  Verified  Payor: Radha Wood / Plan: [facility] [employer] MEDICARE CHOICE PPO/PFFS / Product Type: Managed Care Medicare /    In time:11:25a  Out time: 12:20p  Total Treatment Time (min): 55  Total Timed Codes (min): 40  1:1 Treatment Time ( [address] only): 40   Visit #:  13    Treatment Area: Right foot pain [M79.671]    SUBJECTIVE  Pain Level (0-10 scale): 0/10  Any medication changes, allergies to medications, adverse drug reactions, diagnosis change, or new procedure performed?: [x] No    [] Yes (see summary sheet for update)  Subjective functional status/changes:   [] No changes reported  Patient reports she got cleared to not wear compression sock and states she is able to wear other shoes without issues. Patient reports her MD is happy with her progress and has cleared her from therapy if she feels ready.     OBJECTIVE    Modality rationale: decrease inflammation and decrease pain to improve the patients ability to ambulate and perform ADL's including heavy housework   Min Type Additional Details       [] Estim: []Att   []Unatt    []TENS instruct                  []IFC  []Premod   []NMES                     []Other:  []w/US   []w/ice   []w/heat  Position:  Location:       []  Traction: [] Cervical       []Lumbar                       [] Prone          []Supine                       []Intermittent   []Continuous Lbs:  [] before manual  [] after manual  []w/heat    []  Ultrasound: []Continuous   [] Pulsed                       at: []1MHz   []3MHz Location:  W/cm2:    [] Paraffin         Location:   []w/heat    []  Ice     []  Heat  []  Ice massage Position:  Location:    []  Laser  []  Other: Position:  Location:   15   [x]  Vasopneumatic Device Pressure:       [x] lo [] med [] hi   Temperature: 34     [x] Skin assessment post-treatment:  [x]intact []redness- no adverse reaction    []redness  adverse reaction:     40 min Therapeutic Exercise:  [x] See flow sheet :   Rationale: increase ROM, increase strength, improve coordination, improve balance and increase proprioception to improve the patients ability to ambulate and perform ADL's including heavy housework          With   [x] TE   [] TA   [] neuro   [] other: Patient Education: [x] Review HEP    [] Progressed/Changed HEP based on:   [] positioning   [] body mechanics   [] transfers   [x] heat/ice application    [] other:      Other Objective/Functional Measures:  Foto complete    Lower Extremity AROM:                                                                    I                      Q                                                            Ankle DF                    10               15  Ankle PF                     55                 60  Ankle INV                    35                40  Ankle EVR                  12            12     Great Toe   Extension                    60                    80  Flexion                        -10                   70                 LOWER QUARTER                            MUSCLE STRENGTH  KEY                                                                             R                        0 - No Contraction                   L1, L2 Psoas             5                    1 - Trace                                  L3 Quads                  5                        2 - Poor                                   L4 Tib Ant                    4+/5                     3 - Fair                                     L5 EHL                        3+/5                 -                         4 - Good                                  S1 FHL                        3+/5                   5 - Normal                               S2 Hams                      5                                                                              MMT: See above  Hip abduction R 5/5 L 5/5  Gluts 4/5 B       Ankle DF  R 4+/5  Ankle PF  R 4+/5  Ankle INV  R 4+/5   Ankle EVR R 4+/5    Pain Level (0-10 scale) post treatment: 0/10     ASSESSMENT/Changes in Function:     Patient will continue to benefit from skilled PT services to modify and progress therapeutic interventions, address functional mobility deficits, address ROM deficits, address strength deficits, analyze and address soft tissue restrictions, analyze and cue movement patterns and analyze and modify body mechanics/ergonomics to attain remaining goals. []  See Plan of Care  []  See progress note/recertification  []  See Discharge Summary         Progress towards goals / Updated goals: Patient to schedule one visit in two weeks to ensure no issues with HEP and discharge. Short Term Goals: To be accomplished in 12 treatments:  Pt will be independent in HEP in order to maintain gains made throughout PT episode. MET  Pt will demonstrate full R ankle/foot AROM without symptoms in prep for functional activities. Making progress towards     Long Term Goals: To be accomplished in 24 treatments:  Pt will demonstrate ability to ambulate with normal gait mechanics without increased symptoms for at least 1 mile in order to allow for community activities. Met  Pt will demonstrate R ankle strength 5/5 strength all planes in order to decrease risk of further injury. Making progress towarards  Pt will report no difficulty with heavy activity around home in order to allow for ADLs.  Met    PLAN  [x]  Upgrade activities as tolerated     [x]  Continue plan of care  [x]  Update interventions per flow sheet       []  Discharge due to:_  [x]  Other:_Discharge next visit    Nathen Max , PTA 9/4/2020

## 2020-09-18 ENCOUNTER — APPOINTMENT (OUTPATIENT)
Dept: PHYSICAL THERAPY | Age: 65
End: 2020-09-18
Payer: MEDICARE

## 2020-10-15 RX ORDER — ROSUVASTATIN CALCIUM 10 MG/1
TABLET, COATED ORAL
Qty: 90 TAB | Refills: 1 | Status: SHIPPED | OUTPATIENT
Start: 2020-10-15 | End: 2021-02-23

## 2020-10-19 ENCOUNTER — VIRTUAL VISIT (OUTPATIENT)
Dept: INTERNAL MEDICINE CLINIC | Age: 65
End: 2020-10-19
Payer: MEDICARE

## 2020-10-19 DIAGNOSIS — K51.818 OTHER ULCERATIVE COLITIS WITH OTHER COMPLICATION (HCC): Primary | ICD-10-CM

## 2020-10-19 DIAGNOSIS — E78.5 HYPERLIPIDEMIA LDL GOAL <130: ICD-10-CM

## 2020-10-19 DIAGNOSIS — F32.A MILD DEPRESSION: ICD-10-CM

## 2020-10-19 DIAGNOSIS — E03.4 HYPOTHYROIDISM DUE TO ACQUIRED ATROPHY OF THYROID: ICD-10-CM

## 2020-10-19 PROCEDURE — G9717 DOC PT DX DEP/BP F/U NT REQ: HCPCS | Performed by: INTERNAL MEDICINE

## 2020-10-19 PROCEDURE — G9899 SCRN MAM PERF RSLTS DOC: HCPCS | Performed by: INTERNAL MEDICINE

## 2020-10-19 PROCEDURE — 3017F COLORECTAL CA SCREEN DOC REV: CPT | Performed by: INTERNAL MEDICINE

## 2020-10-19 PROCEDURE — 1090F PRES/ABSN URINE INCON ASSESS: CPT | Performed by: INTERNAL MEDICINE

## 2020-10-19 PROCEDURE — 99214 OFFICE O/P EST MOD 30 MIN: CPT | Performed by: INTERNAL MEDICINE

## 2020-10-19 PROCEDURE — G8427 DOCREV CUR MEDS BY ELIG CLIN: HCPCS | Performed by: INTERNAL MEDICINE

## 2020-10-19 PROCEDURE — G8399 PT W/DXA RESULTS DOCUMENT: HCPCS | Performed by: INTERNAL MEDICINE

## 2020-10-19 PROCEDURE — 1101F PT FALLS ASSESS-DOCD LE1/YR: CPT | Performed by: INTERNAL MEDICINE

## 2020-10-19 NOTE — PROGRESS NOTES
HISTORY OF PRESENT ILLNESS  Shanita Peterson is a 75 y.o. female who is present, aware, and consenting for real-time synchronous virtual video visit through [employer]. HPI  Hyperlipidemia:  Cardiovascular risk analysis - 75 y.o. female LDL goal is under 130. ROS: taking medications as instructed, no medication side effects noted, no TIA's, no chest pain on exertion, no dyspnea on exertion, no swelling of ankles. Tolerating meds, no myalgias or other side effects noted. New concerns: Pt's last LDL was 95.8 on 4/20/20. Continues on Crestor. Pt reports that she continues to exercise with [employer]. She states that she does weights and resistance band workouts at home. Mood: Stable, pt continues to comply with Prozac. She notes that she has been able to see her friends and go out so her mood has been very good. UC: Stable with no blood in stool or melena. Denies constipation. hypothyroidism. Lab Results   Component Value Date/Time    TSH 1.79 04/20/2020 08:50 AM     Thyroid ROS: denies fatigue, weight changes, heat/cold intolerance, bowel/skin changes or CVS symptoms. Continues on Synthroid. Health maintenance: Pt is not UTD with mammogram.     Review of Systems   All other systems reviewed and are negative. Physical Exam  Vitals signs reviewed. Constitutional:       General: She is not in acute distress. Appearance: Normal appearance. She is not ill-appearing, toxic-appearing or diaphoretic. HENT:      Right Ear: Hearing normal.      Left Ear: Hearing normal.      Nose: Nose normal.      Mouth/Throat:      Mouth: Mucous membranes are moist.      Pharynx: Oropharynx is clear. Eyes:      Conjunctiva/sclera: Conjunctivae normal.   Neck:      Musculoskeletal: Normal range of motion. Pulmonary:      Effort: No respiratory distress. Breath sounds: Normal air entry. Musculoskeletal: Normal range of motion. Skin:     General: Skin is warm and dry.    Neurological: General: No focal deficit present. Mental Status: She is alert and oriented to person, place, and time. Mental status is at baseline. Psychiatric:         Mood and Affect: Mood normal.         Behavior: Behavior normal.         Thought Content: Thought content normal.         Judgment: Judgment normal.         ASSESSMENT and PLAN  Diagnoses and all orders for this visit:    1. Other ulcerative colitis with other complication ([address]  Stable and well-managed. No change in medications. 2. Mild depression ([address]  Stable and well-managed. No change in medications. 3. Hypothyroidism due to acquired atrophy of thyroid  Thyroid presumed stable. Rechecking labs to confirm. Pt tolerating medication. I do not recommend a change in treatment plan. 4. Hyperlipidemia LDL goal <130  Presumed stable, will recheck labs today. Patient is tolerating medications with no myalgias. I do not recommend any change in treatment plan. Lab results and schedule of future lab studies reviewed with patient. Reviewed diet, exercise and weight control. Written by Jacy Lopez, as dictated by Dane Castaneda MD.     Current diagnosis and concerns discussed with pt at length. Understands risks and benefits or current treatment plan and medications and accepts the treatment and medication with any possible risks. Pt asks appropriate questions which were answered. Pt instructed to call with any concerns or problems.

## 2020-10-22 ENCOUNTER — APPOINTMENT (OUTPATIENT)
Dept: INTERNAL MEDICINE CLINIC | Age: 65
End: 2020-10-22

## 2020-10-22 LAB
ALBUMIN SERPL-MCNC: 4.1 G/DL (ref 3.5–5)
ALBUMIN/GLOB SERPL: 1.5 {RATIO} (ref 1.1–2.2)
ALP SERPL-CCNC: 79 U/L (ref 45–117)
ALT SERPL-CCNC: 21 U/L (ref 12–78)
ANION GAP SERPL CALC-SCNC: 7 MMOL/L (ref 5–15)
AST SERPL-CCNC: 26 U/L (ref 15–37)
BILIRUB SERPL-MCNC: 0.8 MG/DL (ref 0.2–1)
BUN SERPL-MCNC: 17 MG/DL (ref 6–20)
BUN/CREAT SERPL: 25 (ref 12–20)
CALCIUM SERPL-MCNC: 9.8 MG/DL (ref 8.5–10.1)
CHLORIDE SERPL-SCNC: 105 MMOL/L (ref 97–108)
CHOLEST SERPL-MCNC: 183 MG/DL
CO2 SERPL-SCNC: 28 MMOL/L (ref 21–32)
CREAT SERPL-MCNC: 0.67 MG/DL (ref 0.55–1.02)
GLOBULIN SER CALC-MCNC: 2.8 G/DL (ref 2–4)
GLUCOSE SERPL-MCNC: 83 MG/DL (ref 65–100)
HDLC SERPL-MCNC: 81 MG/DL
HDLC SERPL: 2.3 {RATIO} (ref 0–5)
LDLC SERPL CALC-MCNC: 86 MG/DL (ref 0–100)
LIPID PROFILE,FLP: NORMAL
POTASSIUM SERPL-SCNC: 4.6 MMOL/L (ref 3.5–5.1)
PROT SERPL-MCNC: 6.9 G/DL (ref 6.4–8.2)
SODIUM SERPL-SCNC: 140 MMOL/L (ref 136–145)
T4 FREE SERPL-MCNC: 1.3 NG/DL (ref 0.8–1.5)
TRIGL SERPL-MCNC: 80 MG/DL (ref ?–150)
TSH SERPL DL<=0.05 MIU/L-ACNC: 1.33 UIU/ML (ref 0.36–3.74)
VLDLC SERPL CALC-MCNC: 16 MG/DL

## 2020-10-25 RX ORDER — FLUOXETINE 10 MG/1
CAPSULE ORAL
Qty: 90 CAP | Refills: 1 | Status: SHIPPED | OUTPATIENT
Start: 2020-10-25 | End: 2021-02-23

## 2020-10-31 DIAGNOSIS — E03.4 HYPOTHYROIDISM DUE TO ACQUIRED ATROPHY OF THYROID: ICD-10-CM

## 2020-10-31 RX ORDER — LEVOTHYROXINE SODIUM 75 UG/1
TABLET ORAL
Qty: 90 TAB | Refills: 1 | Status: SHIPPED | OUTPATIENT
Start: 2020-10-31 | End: 2021-02-23

## 2020-11-23 ENCOUNTER — OFFICE VISIT (OUTPATIENT)
Dept: INTERNAL MEDICINE CLINIC | Age: 65
End: 2020-11-23
Payer: MEDICARE

## 2020-11-23 VITALS
TEMPERATURE: 98.1 F | OXYGEN SATURATION: 95 % | HEART RATE: 72 BPM | SYSTOLIC BLOOD PRESSURE: 110 MMHG | DIASTOLIC BLOOD PRESSURE: 70 MMHG | BODY MASS INDEX: 28.13 KG/M2 | HEIGHT: 63 IN | WEIGHT: 158.78 LBS | RESPIRATION RATE: 16 BRPM

## 2020-11-23 DIAGNOSIS — T16.1XXA FOREIGN BODY OF RIGHT EAR, INITIAL ENCOUNTER: Primary | ICD-10-CM

## 2020-11-23 PROCEDURE — G8536 NO DOC ELDER MAL SCRN: HCPCS | Performed by: INTERNAL MEDICINE

## 2020-11-23 PROCEDURE — G9899 SCRN MAM PERF RSLTS DOC: HCPCS | Performed by: INTERNAL MEDICINE

## 2020-11-23 PROCEDURE — 1101F PT FALLS ASSESS-DOCD LE1/YR: CPT | Performed by: INTERNAL MEDICINE

## 2020-11-23 PROCEDURE — 3017F COLORECTAL CA SCREEN DOC REV: CPT | Performed by: INTERNAL MEDICINE

## 2020-11-23 PROCEDURE — 1090F PRES/ABSN URINE INCON ASSESS: CPT | Performed by: INTERNAL MEDICINE

## 2020-11-23 PROCEDURE — G9717 DOC PT DX DEP/BP F/U NT REQ: HCPCS | Performed by: INTERNAL MEDICINE

## 2020-11-23 PROCEDURE — G8419 CALC BMI OUT NRM PARAM NOF/U: HCPCS | Performed by: INTERNAL MEDICINE

## 2020-11-23 PROCEDURE — 99213 OFFICE O/P EST LOW 20 MIN: CPT | Performed by: INTERNAL MEDICINE

## 2020-11-23 PROCEDURE — G8427 DOCREV CUR MEDS BY ELIG CLIN: HCPCS | Performed by: INTERNAL MEDICINE

## 2020-11-23 PROCEDURE — G8399 PT W/DXA RESULTS DOCUMENT: HCPCS | Performed by: INTERNAL MEDICINE

## 2020-11-23 NOTE — PROGRESS NOTES
Assessment and Plan   Diagnoses and all orders for this visit:    1. Foreign body of right ear, initial encounter  Reports right outer ear pain since Thursday. Had some trouble putting her hearing aid in. Denies any significant hearing loss, fever, chills, sinus congestion or drainage. On exam, part of her hearing aid was noted in the ear canal.  Removed with tweezers. No infection noted. Mild dermatitis around outer ear. Recommend a few days of steroid cream to help with irritation. Benefits, risks, possible drug interactions, and side effects of all new medications were reviewed with the patient. Pt verbalized understanding. Return to clinic: As needed    Romulo Dubose MD  [facility]  11/23/2020    Future Appointments   Date Time Provider Hiram Berger   12/18/2020  2:15 PM [facility] 1 [facility]   5/5/2021  9:15 AM Christina Larios MD [facility] BS AMB        Subjective   Chief Complaint   Right ear pain    Lucio Ramirez is a 75 y.o. female         Review of Systems   Constitutional: Negative for chills and fever. Respiratory: Negative for shortness of breath. Cardiovascular: Negative for chest pain. Objective   Vitals:       Visit Vitals  /70 (BP 1 Location: Left arm, BP Patient Position: Sitting)   Pulse 72   Temp 98.1 °F (36.7 °C) (Oral)   Resp 16   Ht 5' 3\" (1.6 m)   Wt 158 lb 12.5 oz (72 kg)   LMP 10/01/2009   SpO2 95%   BMI 28.13 kg/m²        Physical Exam  Constitutional:       Appearance: Normal appearance. She is not ill-appearing. HENT:      Ears:      Comments: Right ear plastic foreign body noted and removed. Mild irritation of the right ear canal but right TM normal.  Left TM also normal.  Mild dermatitis of right outer ear noted  Cardiovascular:      Rate and Rhythm: Normal rate. Pulmonary:      Effort: No respiratory distress. Neurological:      Mental Status: She is alert.       Gait: Gait normal. Psychiatric:         Mood and Affect: Mood normal.         Thought Content: Thought content normal.         Judgment: Judgment normal.          Current Outpatient Medications   Medication Sig    levothyroxine (SYNTHROID) 75 mcg tablet TAKE 1 TABLET DAILY BEFORE BREAKFAST    FLUoxetine (PROzac) 10 mg capsule TAKE 1 CAPSULE EVERY DAY    rosuvastatin (CRESTOR) 10 mg tablet TAKE 1 TABLET EVERY DAY    balsalazide (COLAZAL) 750 mg capsule TAKE 3 CAPSULES BY MOUTH 3 TIMES A DAY    betamethasone valerate (VALISONE) 0.1 % topical cream Apply  to affected area two (2) times a day.  ERGOCALCIFEROL (VITAMIN D PO) Take 1,000 Int'l Units by mouth daily. No current facility-administered medications for this visit.

## 2020-11-23 NOTE — PROGRESS NOTES
Identified pt with two pt identifiers(name and ). Reviewed record in preparation for visit and have obtained necessary documentation. All patient medications has been reviewed. Chief Complaint   Patient presents with    Ear Pain     Right ear pain onset  patient stated she troubble placing hearing aid in       3 most recent PHQ Screens 2020   PHQ Not Done Active Diagnosis of Depression or Bipolar Disorder   Little interest or pleasure in doing things -   Feeling down, depressed, irritable, or hopeless -   Total Score PHQ 2 -   Trouble falling or staying asleep, or sleeping too much -   Feeling tired or having little energy -   Poor appetite, weight loss, or overeating -   Feeling bad about yourself - or that you are a failure or have let yourself or your family down -   Moving or speaking so slowly that other people could have noticed; or the opposite being so fidgety that others notice -   Thoughts of being better off dead, or hurting yourself in some way -   How difficult have these problems made it for you to do your work, take care of your home and get along with others -     Abuse Screening Questionnaire 2018   Do you ever feel afraid of your partner? N   Are you in a relationship with someone who physically or mentally threatens you? N   Is it safe for you to go home? Y       There are no preventive care reminders to display for this patient. Health Maintenance Review: Patient reminded of \"due or due soon\" health maintenance. I have asked the patient to contact his/her primary care provider (PCP) for follow-up on his/her health maintenance.     Vitals:    20 1145   BP: 110/70   Pulse: 72   Resp: 16   Temp: 98.1 °F (36.7 °C)   TempSrc: Oral   SpO2: 95%   Weight: 158 lb 12.5 oz (72 kg)   Height: 5' 3\" (1.6 m)   PainSc:   1   PainLoc: Ear   LMP: 10/01/2009       Wt Readings from Last 3 Encounters:   20 158 lb 12.5 oz (72 kg)   20 158 lb (71.7 kg)   20 158 lb (71.7 kg)     Temp Readings from Last 3 Encounters:   11/23/20 98.1 °F (36.7 °C) (Oral)   06/12/20 97.4 °F (36.3 °C)   06/08/20 98.5 °F (36.9 °C)     BP Readings from Last 3 Encounters:   11/23/20 110/70   06/12/20 134/73   06/08/20 116/74     Pulse Readings from Last 3 Encounters:   11/23/20 72   06/12/20 62   06/08/20 69       Coordination of Care Questionnaire:   1) Have you been to an emergency room, urgent care, or hospitalized since your last visit? No    2. Have seen or consulted any other health care provider since your last visit?  {No  .

## 2020-11-24 NOTE — PROGRESS NOTES
Physical Therapy at [facility],   a part of  [facility]  [address] [facility], [address] [place] [address] Drive  Phone: [phone] Fax: [phone]      Discharge Summary 2-15    Patient name: Bismark Lyle  : 1955  Provider#: [id number]  Referral source: Jenn Campuzano DPM      Medical/Treatment Diagnosis: Right foot pain [M79.671]     Prior Hospitalization: see medical history     Comorbidities: See Plan of Care  Prior Level of Function: See Plan of Care  Medications: Verified on Patient Summary List    Start of Care: 20      Onset Date: 20   Visits from Start of Care: 13     Missed Visits: 0  Reporting Period : 20 to 20    Assessment/Summary of care:Patient has made great progress towards goals and has met 1/2 short term goals and 2/3 long term goals. Patient has met maximum therapeutic benefit at this time and will do well with HEP to maintain gains achieved in therapy and continue to improve strength, endurance and ROM. Short Term Goals: To be accomplished in 12 treatments:  Pt will be independent in HEP in order to maintain gains made throughout PT episode. MET  Pt will demonstrate full R ankle/foot AROM without symptoms in prep for functional activities. Making progress towards     Long Term Goals: To be accomplished in 24 treatments:  Pt will demonstrate ability to ambulate with normal gait mechanics without increased symptoms for at least 1 mile in order to allow for community activities. Met  Pt will demonstrate R ankle strength 5/5 strength all planes in order to decrease risk of further injury. Making progress towarards  Pt will report no difficulty with heavy activity around home in order to allow for ADLs.  Met        RECOMMENDATIONS:  [x]Discontinue therapy: [x]Patient has reached or is progressing toward set goals     []Patient is non-compliant or has abdicated     []Due to lack of appreciable progress towards set goals     []Other  Jody Rivera , PT, DPT, OCS, CMTPT 11/24/2020

## 2020-12-08 ENCOUNTER — TRANSCRIBE ORDER (OUTPATIENT)
Dept: MAMMOGRAPHY | Age: 65
End: 2020-12-08

## 2020-12-08 DIAGNOSIS — Z12.31 VISIT FOR SCREENING MAMMOGRAM: Primary | ICD-10-CM

## 2020-12-18 ENCOUNTER — HOSPITAL ENCOUNTER (OUTPATIENT)
Dept: MAMMOGRAPHY | Age: 65
Discharge: HOME | End: 2020-12-18
Attending: INTERNAL MEDICINE
Payer: MEDICARE

## 2020-12-18 DIAGNOSIS — Z12.31 VISIT FOR SCREENING MAMMOGRAM: ICD-10-CM

## 2020-12-18 PROCEDURE — 77067 SCR MAMMO BI INCL CAD: CPT

## 2021-02-22 DIAGNOSIS — E03.4 HYPOTHYROIDISM DUE TO ACQUIRED ATROPHY OF THYROID: ICD-10-CM

## 2021-02-23 RX ORDER — LEVOTHYROXINE SODIUM 75 UG/1
TABLET ORAL
Qty: 90 TAB | Refills: 1 | Status: SHIPPED | OUTPATIENT
Start: 2021-02-23 | End: 2022-01-23

## 2021-02-23 RX ORDER — FLUOXETINE 10 MG/1
CAPSULE ORAL
Qty: 90 CAP | Refills: 1 | Status: SHIPPED | OUTPATIENT
Start: 2021-02-23 | End: 2021-09-19

## 2021-02-23 RX ORDER — ROSUVASTATIN CALCIUM 10 MG/1
TABLET, COATED ORAL
Qty: 90 TAB | Refills: 1 | Status: SHIPPED | OUTPATIENT
Start: 2021-02-23 | End: 2021-09-07

## 2021-05-05 ENCOUNTER — OFFICE VISIT (OUTPATIENT)
Dept: INTERNAL MEDICINE CLINIC | Age: 66
End: 2021-05-05
Payer: MEDICARE

## 2021-05-05 VITALS
RESPIRATION RATE: 16 BRPM | DIASTOLIC BLOOD PRESSURE: 56 MMHG | HEART RATE: 69 BPM | BODY MASS INDEX: 26.29 KG/M2 | WEIGHT: 148.4 LBS | SYSTOLIC BLOOD PRESSURE: 105 MMHG | HEIGHT: 63 IN | TEMPERATURE: 97.9 F | OXYGEN SATURATION: 94 %

## 2021-05-05 DIAGNOSIS — Z00.00 MEDICARE ANNUAL WELLNESS VISIT, SUBSEQUENT: Primary | ICD-10-CM

## 2021-05-05 DIAGNOSIS — F32.A MILD DEPRESSION: ICD-10-CM

## 2021-05-05 DIAGNOSIS — E03.4 HYPOTHYROIDISM DUE TO ACQUIRED ATROPHY OF THYROID: ICD-10-CM

## 2021-05-05 DIAGNOSIS — K51.818 OTHER ULCERATIVE COLITIS WITH OTHER COMPLICATION (HCC): ICD-10-CM

## 2021-05-05 DIAGNOSIS — E78.5 HYPERLIPIDEMIA LDL GOAL <130: ICD-10-CM

## 2021-05-05 DIAGNOSIS — Z78.0 POST-MENOPAUSAL: ICD-10-CM

## 2021-05-05 PROCEDURE — 1101F PT FALLS ASSESS-DOCD LE1/YR: CPT | Performed by: INTERNAL MEDICINE

## 2021-05-05 PROCEDURE — G9717 DOC PT DX DEP/BP F/U NT REQ: HCPCS | Performed by: INTERNAL MEDICINE

## 2021-05-05 PROCEDURE — 3017F COLORECTAL CA SCREEN DOC REV: CPT | Performed by: INTERNAL MEDICINE

## 2021-05-05 PROCEDURE — G9899 SCRN MAM PERF RSLTS DOC: HCPCS | Performed by: INTERNAL MEDICINE

## 2021-05-05 PROCEDURE — 99214 OFFICE O/P EST MOD 30 MIN: CPT | Performed by: INTERNAL MEDICINE

## 2021-05-05 PROCEDURE — G8427 DOCREV CUR MEDS BY ELIG CLIN: HCPCS | Performed by: INTERNAL MEDICINE

## 2021-05-05 PROCEDURE — 1090F PRES/ABSN URINE INCON ASSESS: CPT | Performed by: INTERNAL MEDICINE

## 2021-05-05 PROCEDURE — G0439 PPPS, SUBSEQ VISIT: HCPCS | Performed by: INTERNAL MEDICINE

## 2021-05-05 PROCEDURE — G8536 NO DOC ELDER MAL SCRN: HCPCS | Performed by: INTERNAL MEDICINE

## 2021-05-05 PROCEDURE — G8399 PT W/DXA RESULTS DOCUMENT: HCPCS | Performed by: INTERNAL MEDICINE

## 2021-05-05 PROCEDURE — G8419 CALC BMI OUT NRM PARAM NOF/U: HCPCS | Performed by: INTERNAL MEDICINE

## 2021-05-05 NOTE — PATIENT INSTRUCTIONS

## 2021-05-05 NOTE — PROGRESS NOTES
Irving Landau (: 1955) is a 77 y.o. female, established patient, here for evaluation of the following chief complaint(s): Annual Wellness Visit       ASSESSMENT/PLAN:  Below is the assessment and plan developed based on review of pertinent history, physical exam, labs, studies, and medications. 1. Medicare annual wellness visit, subsequent  2. Post-menopausal  -     DEXA BONE DENSITY STUDY AXIAL; Future  3. Mild depression (HCC)-cont prozac as doing and feeling well  4. Other ulcerative colitis with other complication (HCC)-cont with colazal and feeling well and has a colon coming up  5. Hypothyroidism due to acquired atrophy of thyroid-stable on current dose of synthroid 75 mcg a day and tolerating medicine- presumed stable; going to recheck labs  6. Hyperlipidemia LDL goal <130-cont with crestor 10 mg once a day      No follow-ups on file. SUBJECTIVE/OBJECTIVE:  HPI  SUBJECTIVE: Irving Landau is a 77 y.o. female here for follow up of hypothyroidism. Lab Results   Component Value Date/Time    TSH 1.33 10/22/2020 08:45 AM     Thyroid ROS: denies fatigue, weight changes, heat/cold intolerance, bowel/skin changes or CVS symptoms. Subjective:   Irving Landau is a 77 y.o. female with hyperlipidemia. Cardiovascular risk analysis - 77 y.o. female LDL goal is under 100. ROS: taking medications as instructed, no medication side effects noted, no TIA's, no chest pain on exertion, no dyspnea on exertion, no swelling of ankles. Tolerating meds, no myalgias or other side effects noted  New concerns: stable . She is exercising and doing silver sneakers and doing a lot of live classes ; balance feels good and feels strong     Dysthymia- cont with current dose of medicine and doing well and feeling well    UC -taking the colazal and has been doing well no rectal bleeding and feeling well     Review of Systems   All other systems reviewed and are negative.       Physical Exam  Vitals signs and nursing note reviewed. On this date 05/05/2021 I have spent 30 minutes reviewing previous notes, test results and face to face with the patient discussing the diagnosis and importance of compliance with the treatment plan as well as documenting on the day of the visit. An electronic signature was used to authenticate this note. -- Jewel Lucero MD   This is the Subsequent Medicare Annual Wellness Exam, performed 12 months or more after the Initial AWV or the last Subsequent AWV    I have reviewed the patient's medical history in detail and updated the computerized patient record. Assessment/Plan   Education and counseling provided:  Are appropriate based on today's review and evaluation    1. Medicare annual wellness visit, subsequent  2. Post-menopausal  -     DEXA BONE DENSITY STUDY AXIAL; Future  3. Mild depression ([facility]  4. Other ulcerative colitis with other complication ([facility]  5. Hypothyroidism due to acquired atrophy of thyroid  6.  Hyperlipidemia LDL goal <130       Depression Risk Factor Screening     3 most recent PHQ Screens 4/16/2020   PHQ Not Done Active Diagnosis of Depression or Bipolar Disorder   Little interest or pleasure in doing things -   Feeling down, depressed, irritable, or hopeless -   Total Score PHQ 2 -   Trouble falling or staying asleep, or sleeping too much -   Feeling tired or having little energy -   Poor appetite, weight loss, or overeating -   Feeling bad about yourself - or that you are a failure or have let yourself or your family down -   Moving or speaking so slowly that other people could have noticed; or the opposite being so fidgety that others notice -   Thoughts of being better off dead, or hurting yourself in some way -   How difficult have these problems made it for you to do your work, take care of your home and get along with others -       Alcohol Risk Screen    Do you average more than 1 drink per night or more than 7 drinks a week: No    On any one occasion in the past three months have you have had more than 3 drinks containing alcohol:  Yes        Functional Ability and Level of Safety    Hearing: Hearing is good. Activities of Daily Living: The home contains: no safety equipment. Patient does total self care      Ambulation: with no difficulty     Fall Risk:  Fall Risk Assessment, last 12 mths 11/23/2020   Able to walk? No   Fall in past 12 months?  -      Abuse Screen:  Patient is not abused       Cognitive Screening    Has your family/caregiver stated any concerns about your memory: no     Cognitive Screening: Normal - MMSE (Mini Mental Status Exam)    Health Maintenance Due     Health Maintenance Due   Topic Date Due    Medicare Yearly Exam  04/17/2021       Patient Care Team   Patient Care Team:  Mehrdad Vargas MD as PCP - [facility], Jayleen Lobo [facility]  Lynne Edmonds MD (Obstetrics & Gynecology)    History     Patient Active Problem List   Diagnosis Code    Hypothyroidism E03.9    Hyperlipidemia LDL goal <130 E78.5    Allergic rhinitis, cause unspecified J30.9    Osteopenia M85.80    Ulcerative colitis ([address] N51.21    Lichen sclerosus et atrophicus of the vulva N90.4    Mild depression ([address] F32.0    Foot pain, right M79.671    Hallux valgus (acquired), right foot M20.11    Tailor's bunion of right foot M21.621    Gastrocnemius equinus of right lower extremity M21.6X1    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     Past Medical History:   Diagnosis Date    Actinic keratosis     Hypercholesteremia     Hypothyroidism     Mild depression ([address]     Seasonal allergies     Ulcerative colitis ([address] 1973      Past Surgical History:   Procedure Laterality Date    ENDOSCOPY, COLON, DIAGNOSTIC      1/13  normal- h/o UC     HX APPENDECTOMY      HX HEENT  02/2014    periodontal graft    HX LIPOMA RESECTION Right 2005    HX ORTHOPAEDIC Right 06/2020    Foot surgery Current Outpatient Medications   Medication Sig Dispense Refill    rosuvastatin (CRESTOR) 10 mg tablet TAKE 1 TABLET EVERY DAY 90 Tab 1    FLUoxetine (PROzac) 10 mg capsule TAKE 1 CAPSULE EVERY DAY 90 Cap 1    levothyroxine (SYNTHROID) 75 mcg tablet TAKE 1 TABLET DAILY BEFORE BREAKFAST 90 Tab 1    balsalazide (COLAZAL) 750 mg capsule TAKE 3 CAPSULES BY MOUTH 3 TIMES A DAY      betamethasone valerate (VALISONE) 0.1 % topical cream Apply  to affected area two (2) times a day. 45 g 3    ERGOCALCIFEROL (VITAMIN D PO) Take 1,000 Int'l Units by mouth daily. Allergies   Allergen Reactions    Sulfa (Sulfonamide Antibiotics) Rash and Nausea Only       Family History   Problem Relation Age of Onset    Elevated Lipids Mother     Lung Disease Father         pulmonary fibrosis    Elevated Lipids Maternal Aunt     Elevated Lipids Maternal Uncle     Diabetes Maternal Grandmother     Heart Disease Maternal Grandfather     Deep Vein Thrombosis Neg Hx     Anesth Problems Neg Hx      Social History     Tobacco Use    Smoking status: Former Smoker     Packs/day: 0.25     Years: 2.00     Pack years: 0.50     Types: Cigarettes     Quit date: 1980     Years since quittin.4    Smokeless tobacco: Never Used   Substance Use Topics    Alcohol use:  Yes     Alcohol/week: 4.0 standard drinks     Types: 4 Glasses of wine per week         Jodie Jamil MD

## 2021-05-06 LAB
ALBUMIN SERPL-MCNC: 4.3 G/DL (ref 3.5–5)
ALBUMIN/GLOB SERPL: 1.5 {RATIO} (ref 1.1–2.2)
ALP SERPL-CCNC: 78 U/L (ref 45–117)
ALT SERPL-CCNC: 24 U/L (ref 12–78)
ANION GAP SERPL CALC-SCNC: 8 MMOL/L (ref 5–15)
AST SERPL-CCNC: 31 U/L (ref 15–37)
BILIRUB SERPL-MCNC: 0.9 MG/DL (ref 0.2–1)
BUN SERPL-MCNC: 17 MG/DL (ref 6–20)
BUN/CREAT SERPL: 27 (ref 12–20)
CALCIUM SERPL-MCNC: 9.4 MG/DL (ref 8.5–10.1)
CHLORIDE SERPL-SCNC: 106 MMOL/L (ref 97–108)
CHOLEST SERPL-MCNC: 178 MG/DL
CO2 SERPL-SCNC: 27 MMOL/L (ref 21–32)
CREAT SERPL-MCNC: 0.62 MG/DL (ref 0.55–1.02)
ERYTHROCYTE [DISTWIDTH] IN BLOOD BY AUTOMATED COUNT: 12.7 % (ref 11.5–14.5)
GLOBULIN SER CALC-MCNC: 2.9 G/DL (ref 2–4)
GLUCOSE SERPL-MCNC: 78 MG/DL (ref 65–100)
HCT VFR BLD AUTO: 42.3 % (ref 35–47)
HDLC SERPL-MCNC: 80 MG/DL
HDLC SERPL: 2.2 {RATIO} (ref 0–5)
HGB BLD-MCNC: 13.2 G/DL (ref 11.5–16)
LDLC SERPL CALC-MCNC: 81.6 MG/DL (ref 0–100)
LIPID PROFILE,FLP: NORMAL
MCH RBC QN AUTO: 30.5 PG (ref 26–34)
MCHC RBC AUTO-ENTMCNC: 31.2 G/DL (ref 30–36.5)
MCV RBC AUTO: 97.7 FL (ref 80–99)
NRBC # BLD: 0 K/UL (ref 0–0.01)
NRBC BLD-RTO: 0 PER 100 WBC
PLATELET # BLD AUTO: 230 K/UL (ref 150–400)
PMV BLD AUTO: 11.2 FL (ref 8.9–12.9)
POTASSIUM SERPL-SCNC: 4.6 MMOL/L (ref 3.5–5.1)
PROT SERPL-MCNC: 7.2 G/DL (ref 6.4–8.2)
RBC # BLD AUTO: 4.33 M/UL (ref 3.8–5.2)
SODIUM SERPL-SCNC: 141 MMOL/L (ref 136–145)
T4 FREE SERPL-MCNC: 1.3 NG/DL (ref 0.8–1.5)
TRIGL SERPL-MCNC: 82 MG/DL (ref ?–150)
TSH SERPL DL<=0.05 MIU/L-ACNC: 1.09 UIU/ML (ref 0.36–3.74)
VLDLC SERPL CALC-MCNC: 16.4 MG/DL
WBC # BLD AUTO: 4.6 K/UL (ref 3.6–11)

## 2021-06-18 ENCOUNTER — HOSPITAL ENCOUNTER (OUTPATIENT)
Dept: MAMMOGRAPHY | Age: 66
Discharge: HOME | End: 2021-06-18
Attending: INTERNAL MEDICINE
Payer: MEDICARE

## 2021-06-18 DIAGNOSIS — Z78.0 POST-MENOPAUSAL: ICD-10-CM

## 2021-06-18 PROCEDURE — 77080 DXA BONE DENSITY AXIAL: CPT

## 2021-09-19 RX ORDER — FLUOXETINE 10 MG/1
CAPSULE ORAL
Qty: 90 CAPSULE | Refills: 1 | Status: SHIPPED | OUTPATIENT
Start: 2021-09-19 | End: 2022-03-28

## 2021-11-04 NOTE — PROGRESS NOTES
Jovan Granda (: 1955) is a 77 y.o. female, established patient, here for evaluation of the following chief complaint(s):  Follow-up       ASSESSMENT/PLAN:  Below is the assessment and plan developed based on review of pertinent history, physical exam, labs, studies, and medications. 1. Hypothyroidism due to acquired atrophy of thyroid  -     TSH 3RD GENERATION; Future  -     T4, FREE; Future  Thyroid presumed stable. Rechecking labs to confirm. Pt tolerating medication. I do not recommend a change in treatment plan. Continue with ongoing regimen of Synthroid. 2. Hyperlipidemia LDL goal <648  -     METABOLIC PANEL, COMPREHENSIVE; Future  -     LIPID PANEL; Future  Presumed stable, will recheck labs today. Patient is tolerating medications with no myalgias. I do not recommend any change in treatment plan. Continue with ongoing regimen of Crestor. 3. Mild depression ([facility] [address]  Stable and well-managed. Continue with ongoing regimen of Prozac.  4. Other ulcerative colitis with other complication ([facility] [address]  Stable and well-managed. Continue with ongoing regimen of Colazal.    No follow-ups on file. SUBJECTIVE/OBJECTIVE:  HPI     UC: Pt denies blood or darker colored stool. She expresses satisfaction with her current regimen and plans to schedule a colonoscopy in the near future. Mood: Pt states that her mental health is strong and she did not suffer through the pandemic. She reports that she is very happy and stable. Hyperlipidemia:  Cardiovascular risk analysis - 77 y.o. female LDL goal is under 130. ROS: taking medications as instructed, no medication side effects noted, no TIA's, no chest pain on exertion, no dyspnea on exertion, no swelling of ankles. Tolerating meds, no myalgias or other side effects noted. New concerns: Pt's last LDL was 81.6 on 21. She notes that her diet is healthy, but her portions are on the larger side. hypothyroidism.   Lab Results   Component Value Date/Time TSH 1.09 05/05/2021 10:06 AM   Thyroid ROS: denies fatigue, weight changes, heat/cold intolerance, bowel/skin changes or CVS symptoms. Other: Pt plans to travel to [place] for Thanksgiving. She has received her COVID-19 booster and flu vaccine. Pt participates in [employer] and alternates between resistance, weights, and cardio. She feels as though she gets a good workout at her home and does not plan to return to a gym at this time. Review of Systems   All other systems reviewed and are negative. Physical Exam  Constitutional:       Appearance: Normal appearance. HENT:      Right Ear: Tympanic membrane and external ear normal.      Left Ear: Tympanic membrane and external ear normal.      Mouth/Throat:      Mouth: Mucous membranes are moist.      Pharynx: Oropharynx is clear. Cardiovascular:      Rate and Rhythm: Normal rate and regular rhythm. Pulses: Normal pulses. Heart sounds: Normal heart sounds. Pulmonary:      Effort: Pulmonary effort is normal.      Breath sounds: Normal breath sounds. Musculoskeletal:         General: Normal range of motion. Skin:     General: Skin is warm and dry. Neurological:      General: No focal deficit present. Mental Status: She is alert and oriented to person, place, and time. Psychiatric:         Mood and Affect: Mood normal.         Behavior: Behavior normal.     On this date 11/05/2021 I have spent 30 minutes reviewing previous notes, test results and face to face with the patient discussing the diagnosis and importance of compliance with the treatment plan as well as documenting on the day of the visit. An electronic signature was used to authenticate this note. Written by Zackary Quiroz as dictated by Dr. Gadiel Ruelas.    -- Zackary Quiroz

## 2021-11-05 ENCOUNTER — OFFICE VISIT (OUTPATIENT)
Dept: INTERNAL MEDICINE CLINIC | Age: 66
End: 2021-11-05
Payer: MEDICARE

## 2021-11-05 VITALS
TEMPERATURE: 97.8 F | SYSTOLIC BLOOD PRESSURE: 109 MMHG | WEIGHT: 150 LBS | HEIGHT: 63 IN | BODY MASS INDEX: 26.58 KG/M2 | HEART RATE: 68 BPM | RESPIRATION RATE: 16 BRPM | OXYGEN SATURATION: 96 % | DIASTOLIC BLOOD PRESSURE: 70 MMHG

## 2021-11-05 DIAGNOSIS — K51.818 OTHER ULCERATIVE COLITIS WITH OTHER COMPLICATION (HCC): ICD-10-CM

## 2021-11-05 DIAGNOSIS — E03.4 HYPOTHYROIDISM DUE TO ACQUIRED ATROPHY OF THYROID: Primary | ICD-10-CM

## 2021-11-05 DIAGNOSIS — E78.5 HYPERLIPIDEMIA LDL GOAL <130: ICD-10-CM

## 2021-11-05 DIAGNOSIS — F32.A MILD DEPRESSION: ICD-10-CM

## 2021-11-05 LAB
ALBUMIN SERPL-MCNC: 4 G/DL (ref 3.5–5)
ALBUMIN/GLOB SERPL: 1.4 {RATIO} (ref 1.1–2.2)
ALP SERPL-CCNC: 72 U/L (ref 45–117)
ALT SERPL-CCNC: 26 U/L (ref 12–78)
ANION GAP SERPL CALC-SCNC: 2 MMOL/L (ref 5–15)
AST SERPL-CCNC: 27 U/L (ref 15–37)
BILIRUB SERPL-MCNC: 0.9 MG/DL (ref 0.2–1)
BUN SERPL-MCNC: 18 MG/DL (ref 6–20)
BUN/CREAT SERPL: 29 (ref 12–20)
CALCIUM SERPL-MCNC: 9.5 MG/DL (ref 8.5–10.1)
CHLORIDE SERPL-SCNC: 106 MMOL/L (ref 97–108)
CHOLEST SERPL-MCNC: 185 MG/DL
CO2 SERPL-SCNC: 30 MMOL/L (ref 21–32)
CREAT SERPL-MCNC: 0.63 MG/DL (ref 0.55–1.02)
GLOBULIN SER CALC-MCNC: 2.9 G/DL (ref 2–4)
GLUCOSE SERPL-MCNC: 80 MG/DL (ref 65–100)
HDLC SERPL-MCNC: 80 MG/DL
HDLC SERPL: 2.3 {RATIO} (ref 0–5)
LDLC SERPL CALC-MCNC: 93.4 MG/DL (ref 0–100)
POTASSIUM SERPL-SCNC: 4.6 MMOL/L (ref 3.5–5.1)
PROT SERPL-MCNC: 6.9 G/DL (ref 6.4–8.2)
SODIUM SERPL-SCNC: 138 MMOL/L (ref 136–145)
T4 FREE SERPL-MCNC: 1.2 NG/DL (ref 0.8–1.5)
TRIGL SERPL-MCNC: 58 MG/DL (ref ?–150)
TSH SERPL DL<=0.05 MIU/L-ACNC: 1.06 UIU/ML (ref 0.36–3.74)
VLDLC SERPL CALC-MCNC: 11.6 MG/DL

## 2021-11-05 PROCEDURE — 99214 OFFICE O/P EST MOD 30 MIN: CPT | Performed by: INTERNAL MEDICINE

## 2021-11-05 PROCEDURE — G9899 SCRN MAM PERF RSLTS DOC: HCPCS | Performed by: INTERNAL MEDICINE

## 2021-11-05 PROCEDURE — G9717 DOC PT DX DEP/BP F/U NT REQ: HCPCS | Performed by: INTERNAL MEDICINE

## 2021-11-05 PROCEDURE — G8419 CALC BMI OUT NRM PARAM NOF/U: HCPCS | Performed by: INTERNAL MEDICINE

## 2021-11-05 PROCEDURE — G8427 DOCREV CUR MEDS BY ELIG CLIN: HCPCS | Performed by: INTERNAL MEDICINE

## 2021-11-05 PROCEDURE — G8399 PT W/DXA RESULTS DOCUMENT: HCPCS | Performed by: INTERNAL MEDICINE

## 2021-11-05 PROCEDURE — 1090F PRES/ABSN URINE INCON ASSESS: CPT | Performed by: INTERNAL MEDICINE

## 2021-11-05 PROCEDURE — 3017F COLORECTAL CA SCREEN DOC REV: CPT | Performed by: INTERNAL MEDICINE

## 2021-11-05 PROCEDURE — G8536 NO DOC ELDER MAL SCRN: HCPCS | Performed by: INTERNAL MEDICINE

## 2021-11-05 PROCEDURE — 1101F PT FALLS ASSESS-DOCD LE1/YR: CPT | Performed by: INTERNAL MEDICINE

## 2021-12-06 ENCOUNTER — TRANSCRIBE ORDER (OUTPATIENT)
Dept: SCHEDULING | Age: 66
End: 2021-12-06

## 2021-12-06 DIAGNOSIS — Z12.31 ENCOUNTER FOR SCREENING MAMMOGRAM FOR MALIGNANT NEOPLASM OF BREAST: Primary | ICD-10-CM

## 2022-01-22 DIAGNOSIS — E03.4 HYPOTHYROIDISM DUE TO ACQUIRED ATROPHY OF THYROID: ICD-10-CM

## 2022-01-23 RX ORDER — LEVOTHYROXINE SODIUM 75 UG/1
TABLET ORAL
Qty: 90 TABLET | Refills: 1 | Status: SHIPPED | OUTPATIENT
Start: 2022-01-23 | End: 2022-08-01

## 2022-01-23 RX ORDER — ROSUVASTATIN CALCIUM 10 MG/1
TABLET, COATED ORAL
Qty: 90 TABLET | Refills: 0 | Status: SHIPPED | OUTPATIENT
Start: 2022-01-23 | End: 2022-03-28

## 2022-02-07 ENCOUNTER — HOSPITAL ENCOUNTER (OUTPATIENT)
Dept: MAMMOGRAPHY | Age: 67
Discharge: HOME | End: 2022-02-07
Attending: INTERNAL MEDICINE
Payer: MEDICARE

## 2022-02-07 DIAGNOSIS — Z12.31 ENCOUNTER FOR SCREENING MAMMOGRAM FOR MALIGNANT NEOPLASM OF BREAST: ICD-10-CM

## 2022-02-07 PROCEDURE — 77067 SCR MAMMO BI INCL CAD: CPT

## 2022-03-18 PROBLEM — M62.461 GASTROCNEMIUS EQUINUS OF RIGHT LOWER EXTREMITY: Status: ACTIVE | Noted: 2020-06-12

## 2022-03-18 PROBLEM — M21.621 TAILOR'S BUNION OF RIGHT FOOT: Status: ACTIVE | Noted: 2020-06-12

## 2022-03-18 PROBLEM — M21.861 GASTROCNEMIUS EQUINUS OF RIGHT LOWER EXTREMITY: Status: ACTIVE | Noted: 2020-06-12

## 2022-03-19 PROBLEM — F32.A MILD DEPRESSION: Status: ACTIVE | Noted: 2018-04-05

## 2022-03-19 PROBLEM — N90.4 LICHEN SCLEROSUS ET ATROPHICUS OF THE VULVA: Status: ACTIVE | Noted: 2018-01-26

## 2022-03-19 PROBLEM — M20.11 HALLUX VALGUS (ACQUIRED), RIGHT FOOT: Status: ACTIVE | Noted: 2020-06-12

## 2022-03-19 PROBLEM — Q66.211 METATARSUS PRIMUS VARUS OF RIGHT FOOT: Status: ACTIVE | Noted: 2020-06-12

## 2022-03-20 PROBLEM — M79.671 FOOT PAIN, RIGHT: Status: ACTIVE | Noted: 2020-06-12

## 2022-03-28 RX ORDER — FLUOXETINE 10 MG/1
CAPSULE ORAL
Qty: 90 CAPSULE | Refills: 1 | Status: SHIPPED | OUTPATIENT
Start: 2022-03-28 | End: 2022-10-14

## 2022-03-28 RX ORDER — ROSUVASTATIN CALCIUM 10 MG/1
TABLET, COATED ORAL
Qty: 90 TABLET | Refills: 0 | Status: SHIPPED | OUTPATIENT
Start: 2022-03-28 | End: 2022-08-01

## 2022-05-05 NOTE — PROGRESS NOTES
Renetta Cleaning (: 1955) is a 74 y.o. female, established patient, here for evaluation of the following chief complaint(s): Annual Wellness Visit       ASSESSMENT/PLAN:  Below is the assessment and plan developed based on review of pertinent history, physical exam, labs, studies, and medications. 1. Medicare annual wellness visit, subsequent  2. Hypothyroidism due to acquired atrophy of thyroid-cont current dose of synthroid   -     TSH 3RD GENERATION; Future  -     T4, FREE; Future  3. Major depressive disorder, single episode, mild (HCC)-stable on prozac and tolerating medicine   4. Other ulcerative colitis with other complication (HCC)-cont with colazal but may change with  due to number of pills   -     CBC W/O DIFF; Future  5. Hyperlipidemia LDL goal <130-stable on crestor and exercising and eating right   -     METABOLIC PANEL, COMPREHENSIVE; Future  -     LIPID PANEL; Future        SUBJECTIVE/OBJECTIVE:  HPI    Mood: she is taking prozac and tolerating medicine and her mood is good- she is going to [place] for retreat for a week     Hyperlipidemia:  Cardiovascular risk analysis - 74 y.o. female LDL goal is under 130. ROS: taking medications as instructed, no medication side effects noted, no TIA's, no chest pain on exertion, no dyspnea on exertion, no swelling of ankles. Tolerating meds, no myalgias or other side effects noted. New concerns: Pt's last LDL was 93.4 on 21.     hypothyroidism. Lab Results   Component Value Date/Time    TSH 1.06 2021 09:08 AM   Thyroid ROS: denies fatigue, weight changes, heat/cold intolerance, bowel/skin changes or CVS symptoms. UC: her colonoscopy is the 26th and she is taking the colazal and doing well and  left practice and now seeing      Review of Systems   All other systems reviewed and are negative. Physical Exam  Constitutional:       Appearance: Normal appearance.    HENT:      Right Ear: Tympanic membrane and external ear normal.      Left Ear: Tympanic membrane and external ear normal.      Mouth/Throat:      Mouth: Mucous membranes are moist.      Pharynx: Oropharynx is clear. Cardiovascular:      Rate and Rhythm: Normal rate and regular rhythm. Pulses: Normal pulses. Heart sounds: Normal heart sounds. Pulmonary:      Effort: Pulmonary effort is normal.      Breath sounds: Normal breath sounds. Musculoskeletal:         General: Normal range of motion. Skin:     General: Skin is warm and dry. Neurological:      General: No focal deficit present. Mental Status: She is alert and oriented to person, place, and time. Psychiatric:         Mood and Affect: Mood normal.         Behavior: Behavior normal.       On this date 05/09/2022 I have spent 35 minutes reviewing previous notes, test results and face to face with the patient discussing the diagnosis and importance of compliance with the treatment plan as well as documenting on the day of the visit. An electronic signature was used to authenticate this note. This is the Subsequent Medicare Annual Wellness Exam, performed 12 months or more after the Initial AWV or the last Subsequent AWV    I have reviewed the patient's medical history in detail and updated the computerized patient record. Assessment/Plan   Education and counseling provided:  Are appropriate based on today's review and evaluation    1. Medicare annual wellness visit, subsequent  2. Hypothyroidism due to acquired atrophy of thyroid  -     TSH 3RD GENERATION; Future  -     T4, FREE; Future  3. Major depressive disorder, single episode, mild (HCC)  4. Other ulcerative colitis with other complication (HCC)  -     CBC W/O DIFF; Future  5. Hyperlipidemia LDL goal <919  -     METABOLIC PANEL, COMPREHENSIVE; Future  -     LIPID PANEL;  Future       Depression Risk Factor Screening     3 most recent PHQ Screens 5/9/2022   PHQ Not Done -   Little interest or pleasure in doing things Not at all   Feeling down, depressed, irritable, or hopeless Not at all   Total Score PHQ 2 0   Trouble falling or staying asleep, or sleeping too much -   Feeling tired or having little energy -   Poor appetite, weight loss, or overeating -   Feeling bad about yourself - or that you are a failure or have let yourself or your family down -   Moving or speaking so slowly that other people could have noticed; or the opposite being so fidgety that others notice -   Thoughts of being better off dead, or hurting yourself in some way -   How difficult have these problems made it for you to do your work, take care of your home and get along with others -       Alcohol & Drug Abuse Risk Screen   Do you average more than 1 drink per night or more than 7 drinks a week?: (P) No  On any one occasion in the past three months have you had more than 3 drinks containing alcohol?: (P) Yes            Functional Ability and Level of Safety   Hearing:  Hearing: (P) Patient wears hearing aid      Activities of Daily Living: The home contains: (P) no safety equipment  Functional ADLs: (P) Patient does total self care     Ambulation:  Patient ambulates: (P) with no difficulty     Fall Risk:  Fall Risk Assessment, last 12 mths 5/9/2022   Able to walk? Yes   Fall in past 12 months? 0   Do you feel unsteady?  0   Are you worried about falling 0     Abuse Screen:  Do you ever feel afraid of your partner?: (P) No  Are you in a relationship with someone who physically or mentally threatens you?: (P) No  Is it safe for you to go home?: (P) Yes        Cognitive Screening   Has your family/caregiver stated any concerns about your memory?: (P) No     Cognitive Screening: Normal - MMSE (Mini Mental Status Exam)    Health Maintenance Due     Health Maintenance Due   Topic Date Due    Medicare Yearly Exam  05/06/2022       Patient Care Team   Patient Care Team:  Valencia Cervantes MD as PCP - Adam Barajas MD as PCP - [facility] Empaneled Provider  Farhad Alfaro MD (Obstetrics & Gynecology)    History     Patient Active Problem List   Diagnosis Code    Hypothyroidism E03.9    Hyperlipidemia LDL goal <130 E78.5    Allergic rhinitis, cause unspecified J30.9    Osteopenia M85.80    Ulcerative colitis ([facility] [address] Q88.68    Lichen sclerosus et atrophicus of the vulva N90.4    Mild depression F32. 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    Major depressive disorder, single episode, mild (HCC) F32.0     Past Medical History:   Diagnosis Date    Actinic keratosis     Hypercholesteremia     Hypothyroidism     Mild depression     Seasonal allergies     Ulcerative colitis ([facility] [address] 1973      Past Surgical History:   Procedure Laterality Date    ENDOSCOPY, COLON, DIAGNOSTIC      1/13  normal- h/o UC     HX APPENDECTOMY      HX HEENT  02/2014    periodontal graft    HX LIPOMA RESECTION Right 2005    HX ORTHOPAEDIC Right 06/2020    Foot surgery     Current Outpatient Medications   Medication Sig Dispense Refill    FLUoxetine (PROzac) 10 mg capsule TAKE 1 CAPSULE EVERY DAY 90 Capsule 1    rosuvastatin (CRESTOR) 10 mg tablet TAKE 1 TABLET EVERY DAY 90 Tablet 0    levothyroxine (SYNTHROID) 75 mcg tablet TAKE 1 TABLET DAILY BEFORE BREAKFAST 90 Tablet 1    balsalazide (COLAZAL) 750 mg capsule TAKE 3 CAPSULES BY MOUTH 3 TIMES A DAY      ERGOCALCIFEROL (VITAMIN D PO) Take 1,000 Int'l Units by mouth daily.  betamethasone valerate (VALISONE) 0.1 % topical cream Apply  to affected area two (2) times a day.  45 g 3     Allergies   Allergen Reactions    Sulfa (Sulfonamide Antibiotics) Rash and Nausea Only       Family History   Problem Relation Age of Onset    Elevated Lipids Mother     Lung Disease Father         pulmonary fibrosis    Elevated Lipids Maternal Aunt     Elevated Lipids Maternal Uncle     Diabetes Maternal Grandmother     Heart Disease Maternal Grandfather     Deep Vein Thrombosis Neg Hx     Anesth Problems Neg Hx      Social History     Tobacco Use    Smoking status: Former Smoker     Packs/day: 0.25     Years: 2.00     Pack years: 0.50     Types: Cigarettes     Quit date: 1980     Years since quittin.4    Smokeless tobacco: Never Used   Substance Use Topics    Alcohol use:  Yes     Alcohol/week: 4.0 standard drinks     Types: 4 Glasses of wine per week         Poonam Murillo MD

## 2022-05-09 ENCOUNTER — OFFICE VISIT (OUTPATIENT)
Dept: INTERNAL MEDICINE CLINIC | Age: 67
End: 2022-05-09
Payer: MEDICARE

## 2022-05-09 VITALS
DIASTOLIC BLOOD PRESSURE: 68 MMHG | HEIGHT: 63 IN | BODY MASS INDEX: 25.73 KG/M2 | TEMPERATURE: 98.5 F | SYSTOLIC BLOOD PRESSURE: 102 MMHG | OXYGEN SATURATION: 94 % | RESPIRATION RATE: 16 BRPM | WEIGHT: 145.2 LBS | HEART RATE: 68 BPM

## 2022-05-09 DIAGNOSIS — K51.818 OTHER ULCERATIVE COLITIS WITH OTHER COMPLICATION (HCC): ICD-10-CM

## 2022-05-09 DIAGNOSIS — E78.5 HYPERLIPIDEMIA LDL GOAL <130: ICD-10-CM

## 2022-05-09 DIAGNOSIS — E03.4 HYPOTHYROIDISM DUE TO ACQUIRED ATROPHY OF THYROID: ICD-10-CM

## 2022-05-09 DIAGNOSIS — Z00.00 MEDICARE ANNUAL WELLNESS VISIT, SUBSEQUENT: ICD-10-CM

## 2022-05-09 DIAGNOSIS — F32.0 MAJOR DEPRESSIVE DISORDER, SINGLE EPISODE, MILD (HCC): Primary | ICD-10-CM

## 2022-05-09 PROCEDURE — G8399 PT W/DXA RESULTS DOCUMENT: HCPCS | Performed by: INTERNAL MEDICINE

## 2022-05-09 PROCEDURE — G8419 CALC BMI OUT NRM PARAM NOF/U: HCPCS | Performed by: INTERNAL MEDICINE

## 2022-05-09 PROCEDURE — G0439 PPPS, SUBSEQ VISIT: HCPCS | Performed by: INTERNAL MEDICINE

## 2022-05-09 PROCEDURE — 1090F PRES/ABSN URINE INCON ASSESS: CPT | Performed by: INTERNAL MEDICINE

## 2022-05-09 PROCEDURE — G8536 NO DOC ELDER MAL SCRN: HCPCS | Performed by: INTERNAL MEDICINE

## 2022-05-09 PROCEDURE — 3017F COLORECTAL CA SCREEN DOC REV: CPT | Performed by: INTERNAL MEDICINE

## 2022-05-09 PROCEDURE — G9899 SCRN MAM PERF RSLTS DOC: HCPCS | Performed by: INTERNAL MEDICINE

## 2022-05-09 PROCEDURE — G9717 DOC PT DX DEP/BP F/U NT REQ: HCPCS | Performed by: INTERNAL MEDICINE

## 2022-05-09 PROCEDURE — G8427 DOCREV CUR MEDS BY ELIG CLIN: HCPCS | Performed by: INTERNAL MEDICINE

## 2022-05-09 PROCEDURE — 99214 OFFICE O/P EST MOD 30 MIN: CPT | Performed by: INTERNAL MEDICINE

## 2022-05-09 PROCEDURE — 1101F PT FALLS ASSESS-DOCD LE1/YR: CPT | Performed by: INTERNAL MEDICINE

## 2022-05-09 NOTE — PATIENT INSTRUCTIONS

## 2022-05-10 LAB
ALBUMIN SERPL-MCNC: 4.1 G/DL (ref 3.5–5)
ALBUMIN/GLOB SERPL: 1.4 {RATIO} (ref 1.1–2.2)
ALP SERPL-CCNC: 78 U/L (ref 45–117)
ALT SERPL-CCNC: 23 U/L (ref 12–78)
ANION GAP SERPL CALC-SCNC: 4 MMOL/L (ref 5–15)
AST SERPL-CCNC: 31 U/L (ref 15–37)
BILIRUB SERPL-MCNC: 0.8 MG/DL (ref 0.2–1)
BUN SERPL-MCNC: 18 MG/DL (ref 6–20)
BUN/CREAT SERPL: 25 (ref 12–20)
CALCIUM SERPL-MCNC: 9.3 MG/DL (ref 8.5–10.1)
CHLORIDE SERPL-SCNC: 104 MMOL/L (ref 97–108)
CHOLEST SERPL-MCNC: 171 MG/DL
CO2 SERPL-SCNC: 29 MMOL/L (ref 21–32)
CREAT SERPL-MCNC: 0.71 MG/DL (ref 0.55–1.02)
ERYTHROCYTE [DISTWIDTH] IN BLOOD BY AUTOMATED COUNT: 13 % (ref 11.5–14.5)
GLOBULIN SER CALC-MCNC: 2.9 G/DL (ref 2–4)
GLUCOSE SERPL-MCNC: 75 MG/DL (ref 65–100)
HCT VFR BLD AUTO: 41.8 % (ref 35–47)
HDLC SERPL-MCNC: 74 MG/DL
HDLC SERPL: 2.3 {RATIO} (ref 0–5)
HGB BLD-MCNC: 13.1 G/DL (ref 11.5–16)
LDLC SERPL CALC-MCNC: 83.8 MG/DL (ref 0–100)
MCH RBC QN AUTO: 31 PG (ref 26–34)
MCHC RBC AUTO-ENTMCNC: 31.3 G/DL (ref 30–36.5)
MCV RBC AUTO: 99.1 FL (ref 80–99)
NRBC # BLD: 0 K/UL (ref 0–0.01)
NRBC BLD-RTO: 0 PER 100 WBC
PLATELET # BLD AUTO: 257 K/UL (ref 150–400)
PMV BLD AUTO: 10.6 FL (ref 8.9–12.9)
POTASSIUM SERPL-SCNC: 4.4 MMOL/L (ref 3.5–5.1)
PROT SERPL-MCNC: 7 G/DL (ref 6.4–8.2)
RBC # BLD AUTO: 4.22 M/UL (ref 3.8–5.2)
SODIUM SERPL-SCNC: 137 MMOL/L (ref 136–145)
T4 FREE SERPL-MCNC: 1.3 NG/DL (ref 0.8–1.5)
TRIGL SERPL-MCNC: 66 MG/DL (ref ?–150)
TSH SERPL DL<=0.05 MIU/L-ACNC: 0.69 UIU/ML (ref 0.36–3.74)
VLDLC SERPL CALC-MCNC: 13.2 MG/DL
WBC # BLD AUTO: 4.6 K/UL (ref 3.6–11)

## 2022-07-31 DIAGNOSIS — E03.4 HYPOTHYROIDISM DUE TO ACQUIRED ATROPHY OF THYROID: ICD-10-CM

## 2022-08-01 RX ORDER — ROSUVASTATIN CALCIUM 10 MG/1
TABLET, COATED ORAL
Qty: 90 TABLET | Refills: 0 | Status: SHIPPED | OUTPATIENT
Start: 2022-08-01

## 2022-08-01 RX ORDER — LEVOTHYROXINE SODIUM 75 UG/1
TABLET ORAL
Qty: 90 TABLET | Refills: 1 | Status: SHIPPED | OUTPATIENT
Start: 2022-08-01

## 2022-10-14 RX ORDER — FLUOXETINE 10 MG/1
CAPSULE ORAL
Qty: 90 CAPSULE | Refills: 1 | Status: SHIPPED | OUTPATIENT
Start: 2022-10-14

## 2022-11-07 NOTE — PROGRESS NOTES
Bronwyn Hartman (: 1955) is a 74 y.o. female, established patient, here for evaluation of the following chief complaint(s):  Follow-up (labs)       ASSESSMENT/PLAN:  Below is the assessment and plan developed based on review of pertinent history, physical exam, labs, studies, and medications. 1. Hypothyroidism due to acquired atrophy of thyroid-cont with synthroid and tolerating medicine   -     TSH 3RD GENERATION; Future  -     T4, FREE; Future  2. Major depressive disorder, single episode, mild ([address]- cont with prozac and tolerating medicine and feeling well  3. Other ulcerative colitis with other complication ([address]- no acute changes in bowels or blood but she does have fatigue but want to make sure not anemic   4. Hyperlipidemia LDL goal <130- cont with crestor and tolerating medicine   -     METABOLIC PANEL, COMPREHENSIVE; Future  -     LIPID PANEL; Future  5. Other fatigue- could be normal but will make sure she is not anemic and check thyroid labs to make sure not cause, but sleeping well and not depressed   -     CBC WITH AUTOMATED DIFF; Future      SUBJECTIVE/OBJECTIVE:  HPI  Mood: she is taking prozac and tolerating medicine and her mood is good- she is going to [place] for retreat for a week      Hyperlipidemia:  Cardiovascular risk analysis - 74 y.o. female LDL goal is under 130. ROS: taking medications as instructed, no medication side effects noted, no TIA's, no chest pain on exertion, no dyspnea on exertion, no swelling of ankles. Tolerating meds, no myalgias or other side effects noted. New concerns: Pt's last LDL was 93.4 on 21. SUBJECTIVE: Bronwyn Hartman is a 74 y.o. female here for follow up of hypothyroidism. Lab Results   Component Value Date/Time    TSH 0.69 2022 10:20 AM     Thyroid ROS: , weight changes, heat/cold intolerance, bowel/skin changes or CVS symptoms.   She has a lot of fatigue and she is getting sleep      UC: her colonoscopy is the  and she is taking the colazal and seeing    Subjective:   Tianna Sheffield is a 74 y.o. female with hyperlipidemia. Cardiovascular risk analysis - 74 y.o. female LDL goal is under 130. ROS: taking medications as instructed, no medication side effects noted, no TIA's, no chest pain on exertion, no dyspnea on exertion, no swelling of ankles. Tolerating meds, no myalgias or other side effects noted  New concerns: tolerating emdicine . Lab Results   Component Value Date/Time    Cholesterol, total 171 05/09/2022 10:20 AM    HDL Cholesterol 74 05/09/2022 10:20 AM    LDL, calculated 83.8 05/09/2022 10:20 AM    VLDL, calculated 13.2 05/09/2022 10:20 AM    Triglyceride 66 05/09/2022 10:20 AM    CHOL/HDL Ratio 2.3 05/09/2022 10:20 AM        Review of Systems   All other systems reviewed and are negative. Physical Exam  Vitals and nursing note reviewed. Constitutional:       Appearance: She is well-developed. HENT:      Head: Normocephalic and atraumatic. Right Ear: External ear normal.      Left Ear: External ear normal.      Nose: Nose normal.   Neck:      Thyroid: No thyroid mass or thyromegaly. Vascular: No carotid bruit or JVD. Cardiovascular:      Rate and Rhythm: Normal rate and regular rhythm. Pulses: Normal pulses. Heart sounds: Normal heart sounds, S1 normal and S2 normal. No murmur heard. No friction rub. No gallop. Pulmonary:      Effort: Pulmonary effort is normal.      Breath sounds: Normal breath sounds. Abdominal:      General: Bowel sounds are normal.      Palpations: Abdomen is soft. Musculoskeletal:         General: Normal range of motion. Cervical back: Normal range of motion and neck supple. Skin:     General: Skin is warm and dry. Neurological:      Mental Status: She is alert and oriented to person, place, and time. Psychiatric:         Behavior: Behavior normal.         Thought Content:  Thought content normal.         Judgment: Judgment normal.       On this date 11/09/2022 I have spent 30 minutes reviewing previous notes, test results and face to face with the patient discussing the diagnosis and importance of compliance with the treatment plan as well as documenting on the day of the visit. An electronic signature was used to authenticate this note.   -- Roderick Linder MD

## 2022-11-09 ENCOUNTER — OFFICE VISIT (OUTPATIENT)
Dept: INTERNAL MEDICINE CLINIC | Age: 67
End: 2022-11-09
Payer: MEDICARE

## 2022-11-09 VITALS
RESPIRATION RATE: 14 BRPM | SYSTOLIC BLOOD PRESSURE: 114 MMHG | HEIGHT: 63 IN | OXYGEN SATURATION: 95 % | BODY MASS INDEX: 26.22 KG/M2 | HEART RATE: 75 BPM | WEIGHT: 148 LBS | TEMPERATURE: 98 F | DIASTOLIC BLOOD PRESSURE: 77 MMHG

## 2022-11-09 DIAGNOSIS — E03.4 HYPOTHYROIDISM DUE TO ACQUIRED ATROPHY OF THYROID: Primary | ICD-10-CM

## 2022-11-09 DIAGNOSIS — E78.5 HYPERLIPIDEMIA LDL GOAL <130: ICD-10-CM

## 2022-11-09 DIAGNOSIS — K51.818 OTHER ULCERATIVE COLITIS WITH OTHER COMPLICATION (HCC): ICD-10-CM

## 2022-11-09 DIAGNOSIS — R53.83 OTHER FATIGUE: ICD-10-CM

## 2022-11-09 DIAGNOSIS — F32.0 MAJOR DEPRESSIVE DISORDER, SINGLE EPISODE, MILD (HCC): ICD-10-CM

## 2022-11-09 PROCEDURE — 99214 OFFICE O/P EST MOD 30 MIN: CPT | Performed by: INTERNAL MEDICINE

## 2022-11-09 PROCEDURE — G8427 DOCREV CUR MEDS BY ELIG CLIN: HCPCS | Performed by: INTERNAL MEDICINE

## 2022-11-09 PROCEDURE — G8417 CALC BMI ABV UP PARAM F/U: HCPCS | Performed by: INTERNAL MEDICINE

## 2022-11-09 PROCEDURE — 1101F PT FALLS ASSESS-DOCD LE1/YR: CPT | Performed by: INTERNAL MEDICINE

## 2022-11-09 PROCEDURE — G8399 PT W/DXA RESULTS DOCUMENT: HCPCS | Performed by: INTERNAL MEDICINE

## 2022-11-09 PROCEDURE — G8536 NO DOC ELDER MAL SCRN: HCPCS | Performed by: INTERNAL MEDICINE

## 2022-11-09 PROCEDURE — G9899 SCRN MAM PERF RSLTS DOC: HCPCS | Performed by: INTERNAL MEDICINE

## 2022-11-09 PROCEDURE — G9717 DOC PT DX DEP/BP F/U NT REQ: HCPCS | Performed by: INTERNAL MEDICINE

## 2022-11-09 PROCEDURE — 1090F PRES/ABSN URINE INCON ASSESS: CPT | Performed by: INTERNAL MEDICINE

## 2022-11-09 PROCEDURE — 3017F COLORECTAL CA SCREEN DOC REV: CPT | Performed by: INTERNAL MEDICINE

## 2022-11-09 RX ORDER — MESALAMINE 0.38 G/1
CAPSULE, EXTENDED RELEASE ORAL
COMMUNITY
Start: 2022-09-03

## 2022-11-10 LAB
ALBUMIN SERPL-MCNC: 4.2 G/DL (ref 3.5–5)
ALBUMIN/GLOB SERPL: 1.6 {RATIO} (ref 1.1–2.2)
ALP SERPL-CCNC: 82 U/L (ref 45–117)
ALT SERPL-CCNC: 37 U/L (ref 12–78)
ANION GAP SERPL CALC-SCNC: 4 MMOL/L (ref 5–15)
AST SERPL-CCNC: 30 U/L (ref 15–37)
BASOPHILS # BLD: 0 K/UL (ref 0–0.1)
BASOPHILS NFR BLD: 1 % (ref 0–1)
BILIRUB SERPL-MCNC: 0.8 MG/DL (ref 0.2–1)
BUN SERPL-MCNC: 20 MG/DL (ref 6–20)
BUN/CREAT SERPL: 28 (ref 12–20)
CALCIUM SERPL-MCNC: 9.2 MG/DL (ref 8.5–10.1)
CHLORIDE SERPL-SCNC: 107 MMOL/L (ref 97–108)
CHOLEST SERPL-MCNC: 180 MG/DL
CO2 SERPL-SCNC: 30 MMOL/L (ref 21–32)
CREAT SERPL-MCNC: 0.71 MG/DL (ref 0.55–1.02)
DIFFERENTIAL METHOD BLD: NORMAL
EOSINOPHIL # BLD: 0.1 K/UL (ref 0–0.4)
EOSINOPHIL NFR BLD: 2 % (ref 0–7)
ERYTHROCYTE [DISTWIDTH] IN BLOOD BY AUTOMATED COUNT: 12.6 % (ref 11.5–14.5)
GLOBULIN SER CALC-MCNC: 2.7 G/DL (ref 2–4)
GLUCOSE SERPL-MCNC: 80 MG/DL (ref 65–100)
HCT VFR BLD AUTO: 42.9 % (ref 35–47)
HDLC SERPL-MCNC: 74 MG/DL
HDLC SERPL: 2.4 {RATIO} (ref 0–5)
HGB BLD-MCNC: 13.5 G/DL (ref 11.5–16)
IMM GRANULOCYTES # BLD AUTO: 0 K/UL (ref 0–0.04)
IMM GRANULOCYTES NFR BLD AUTO: 0 % (ref 0–0.5)
LDLC SERPL CALC-MCNC: 91.6 MG/DL (ref 0–100)
LYMPHOCYTES # BLD: 1.4 K/UL (ref 0.8–3.5)
LYMPHOCYTES NFR BLD: 31 % (ref 12–49)
MCH RBC QN AUTO: 29.9 PG (ref 26–34)
MCHC RBC AUTO-ENTMCNC: 31.5 G/DL (ref 30–36.5)
MCV RBC AUTO: 94.9 FL (ref 80–99)
MONOCYTES # BLD: 0.4 K/UL (ref 0–1)
MONOCYTES NFR BLD: 8 % (ref 5–13)
NEUTS SEG # BLD: 2.7 K/UL (ref 1.8–8)
NEUTS SEG NFR BLD: 58 % (ref 32–75)
NRBC # BLD: 0 K/UL (ref 0–0.01)
NRBC BLD-RTO: 0 PER 100 WBC
PLATELET # BLD AUTO: 223 K/UL (ref 150–400)
PMV BLD AUTO: 10.9 FL (ref 8.9–12.9)
POTASSIUM SERPL-SCNC: 4.4 MMOL/L (ref 3.5–5.1)
PROT SERPL-MCNC: 6.9 G/DL (ref 6.4–8.2)
RBC # BLD AUTO: 4.52 M/UL (ref 3.8–5.2)
SODIUM SERPL-SCNC: 141 MMOL/L (ref 136–145)
T4 FREE SERPL-MCNC: 1.3 NG/DL (ref 0.8–1.5)
TRIGL SERPL-MCNC: 72 MG/DL (ref ?–150)
TSH SERPL DL<=0.05 MIU/L-ACNC: 1.03 UIU/ML (ref 0.36–3.74)
VLDLC SERPL CALC-MCNC: 14.4 MG/DL
WBC # BLD AUTO: 4.6 K/UL (ref 3.6–11)

## 2023-01-09 ENCOUNTER — TRANSCRIBE ORDER (OUTPATIENT)
Dept: SCHEDULING | Age: 68
End: 2023-01-09

## 2023-01-09 DIAGNOSIS — Z12.31 SCREENING MAMMOGRAM FOR HIGH-RISK PATIENT: Primary | ICD-10-CM

## 2023-02-17 ENCOUNTER — HOSPITAL ENCOUNTER (OUTPATIENT)
Dept: MAMMOGRAPHY | Age: 68
End: 2023-02-17
Attending: INTERNAL MEDICINE
Payer: MEDICARE

## 2023-02-17 DIAGNOSIS — Z12.31 SCREENING MAMMOGRAM FOR HIGH-RISK PATIENT: ICD-10-CM

## 2023-02-17 PROCEDURE — 77067 SCR MAMMO BI INCL CAD: CPT

## 2023-02-18 DIAGNOSIS — E03.4 HYPOTHYROIDISM DUE TO ACQUIRED ATROPHY OF THYROID: ICD-10-CM

## 2023-02-19 RX ORDER — ROSUVASTATIN CALCIUM 10 MG/1
TABLET, COATED ORAL
Qty: 90 TABLET | Refills: 0 | Status: SHIPPED | OUTPATIENT
Start: 2023-02-19

## 2023-02-19 RX ORDER — LEVOTHYROXINE SODIUM 75 UG/1
TABLET ORAL
Qty: 90 TABLET | Refills: 1 | Status: SHIPPED | OUTPATIENT
Start: 2023-02-19

## 2023-08-05 RX ORDER — ROSUVASTATIN CALCIUM 10 MG/1
TABLET, COATED ORAL
Qty: 90 TABLET | Refills: 0 | Status: SHIPPED | OUTPATIENT
Start: 2023-08-05

## 2023-08-21 NOTE — PROGRESS NOTES
General: Normal range of motion. Cervical back: Normal range of motion. Skin:     General: Skin is warm. Neurological:      General: No focal deficit present. Mental Status: She is alert and oriented to person, place, and time. Mental status is at baseline. Psychiatric:         Mood and Affect: Mood normal.         Behavior: Behavior normal.         Thought Content: Thought content normal.         Judgment: Judgment normal.          On this date 8/29/2023 I have spent 30 minutes reviewing previous notes, test results and face to face with the patient discussing the diagnosis and importance of compliance with the treatment plan as well as documenting on the day of the visit. An electronic signature was used to authenticate this note. --Andrew Lemus MD Medicare Annual Wellness Visit    Mya Denson is here for Medicare AWV (Pt here for annual wellness exam, pt is fasting for labs. Pt has no complaints. )    Assessment & Plan   Medicare annual wellness visit, subsequent  Atrophy of thyroid (acquired)  -     TSH; Future  -     T4, Free; Future  Major depressive disorder, single episode, mild (HCC)  Other ulcerative colitis with other complication (HCC)  -     CBC; Future  Hyperlipidemia LDL goal <100  -     Lipid Panel; Future  -     Comprehensive Metabolic Panel; Future    Recommendations for Preventive Services Due: see orders and patient instructions/AVS.  Recommended screening schedule for the next 5-10 years is provided to the patient in written form: see Patient Instructions/AVS.     Return for Medicare Annual Wellness Visit in 1 year. Subjective       Patient's complete Health Risk Assessment and screening values have been reviewed and are found in Flowsheets. The following problems were reviewed today and where indicated follow up appointments were made and/or referrals ordered. No Positive Risk Factors identified today.

## 2023-08-26 SDOH — ECONOMIC STABILITY: TRANSPORTATION INSECURITY
IN THE PAST 12 MONTHS, HAS LACK OF TRANSPORTATION KEPT YOU FROM MEETINGS, WORK, OR FROM GETTING THINGS NEEDED FOR DAILY LIVING?: NO

## 2023-08-26 SDOH — ECONOMIC STABILITY: HOUSING INSECURITY
IN THE LAST 12 MONTHS, WAS THERE A TIME WHEN YOU DID NOT HAVE A STEADY PLACE TO SLEEP OR SLEPT IN A SHELTER (INCLUDING NOW)?: NO

## 2023-08-26 SDOH — ECONOMIC STABILITY: FOOD INSECURITY: WITHIN THE PAST 12 MONTHS, YOU WORRIED THAT YOUR FOOD WOULD RUN OUT BEFORE YOU GOT MONEY TO BUY MORE.: NEVER TRUE

## 2023-08-26 SDOH — HEALTH STABILITY: PHYSICAL HEALTH: ON AVERAGE, HOW MANY MINUTES DO YOU ENGAGE IN EXERCISE AT THIS LEVEL?: 50 MIN

## 2023-08-26 SDOH — HEALTH STABILITY: PHYSICAL HEALTH: ON AVERAGE, HOW MANY DAYS PER WEEK DO YOU ENGAGE IN MODERATE TO STRENUOUS EXERCISE (LIKE A BRISK WALK)?: 2 DAYS

## 2023-08-26 SDOH — ECONOMIC STABILITY: FOOD INSECURITY: WITHIN THE PAST 12 MONTHS, THE FOOD YOU BOUGHT JUST DIDN'T LAST AND YOU DIDN'T HAVE MONEY TO GET MORE.: NEVER TRUE

## 2023-08-26 SDOH — ECONOMIC STABILITY: INCOME INSECURITY: HOW HARD IS IT FOR YOU TO PAY FOR THE VERY BASICS LIKE FOOD, HOUSING, MEDICAL CARE, AND HEATING?: NOT HARD AT ALL

## 2023-08-26 ASSESSMENT — LIFESTYLE VARIABLES
HOW OFTEN DURING THE LAST YEAR HAVE YOU NEEDED AN ALCOHOLIC DRINK FIRST THING IN THE MORNING TO GET YOURSELF GOING AFTER A NIGHT OF HEAVY DRINKING: NEVER
HOW MANY STANDARD DRINKS CONTAINING ALCOHOL DO YOU HAVE ON A TYPICAL DAY: 1
HOW OFTEN DURING THE LAST YEAR HAVE YOU BEEN UNABLE TO REMEMBER WHAT HAPPENED THE NIGHT BEFORE BECAUSE YOU HAD BEEN DRINKING: NEVER
HOW OFTEN DURING THE LAST YEAR HAVE YOU FAILED TO DO WHAT WAS NORMALLY EXPECTED FROM YOU BECAUSE OF DRINKING: NEVER
HOW OFTEN DURING THE LAST YEAR HAVE YOU BEEN UNABLE TO REMEMBER WHAT HAPPENED THE NIGHT BEFORE BECAUSE YOU HAD BEEN DRINKING: 0
HAS A RELATIVE, FRIEND, DOCTOR, OR ANOTHER HEALTH PROFESSIONAL EXPRESSED CONCERN ABOUT YOUR DRINKING OR SUGGESTED YOU CUT DOWN: NO
HAVE YOU OR SOMEONE ELSE BEEN INJURED AS A RESULT OF YOUR DRINKING: 0
HOW OFTEN DO YOU HAVE SIX OR MORE DRINKS ON ONE OCCASION: 1
HOW OFTEN DO YOU HAVE A DRINK CONTAINING ALCOHOL: 5
HOW OFTEN DURING THE LAST YEAR HAVE YOU FAILED TO DO WHAT WAS NORMALLY EXPECTED FROM YOU BECAUSE OF DRINKING: 0
HAS A RELATIVE, FRIEND, DOCTOR, OR ANOTHER HEALTH PROFESSIONAL EXPRESSED CONCERN ABOUT YOUR DRINKING OR SUGGESTED YOU CUT DOWN: 0
HOW OFTEN DURING THE LAST YEAR HAVE YOU FOUND THAT YOU WERE NOT ABLE TO STOP DRINKING ONCE YOU HAD STARTED: NEVER
HOW OFTEN DURING THE LAST YEAR HAVE YOU FOUND THAT YOU WERE NOT ABLE TO STOP DRINKING ONCE YOU HAD STARTED: 0
HAVE YOU OR SOMEONE ELSE BEEN INJURED AS A RESULT OF YOUR DRINKING: NO
HOW OFTEN DURING THE LAST YEAR HAVE YOU HAD A FEELING OF GUILT OR REMORSE AFTER DRINKING: 0
HOW OFTEN DURING THE LAST YEAR HAVE YOU HAD A FEELING OF GUILT OR REMORSE AFTER DRINKING: NEVER
HOW MANY STANDARD DRINKS CONTAINING ALCOHOL DO YOU HAVE ON A TYPICAL DAY: 1 OR 2
HOW OFTEN DO YOU HAVE A DRINK CONTAINING ALCOHOL: 4 OR MORE TIMES A WEEK
HOW OFTEN DURING THE LAST YEAR HAVE YOU NEEDED AN ALCOHOLIC DRINK FIRST THING IN THE MORNING TO GET YOURSELF GOING AFTER A NIGHT OF HEAVY DRINKING: 0

## 2023-08-26 ASSESSMENT — PATIENT HEALTH QUESTIONNAIRE - PHQ9
1. LITTLE INTEREST OR PLEASURE IN DOING THINGS: 0
SUM OF ALL RESPONSES TO PHQ QUESTIONS 1-9: 0
SUM OF ALL RESPONSES TO PHQ9 QUESTIONS 1 & 2: 0
SUM OF ALL RESPONSES TO PHQ QUESTIONS 1-9: 0
2. FEELING DOWN, DEPRESSED OR HOPELESS: 0

## 2023-08-29 ENCOUNTER — OFFICE VISIT (OUTPATIENT)
Age: 68
End: 2023-08-29
Payer: COMMERCIAL

## 2023-08-29 VITALS
WEIGHT: 152 LBS | RESPIRATION RATE: 16 BRPM | HEART RATE: 84 BPM | BODY MASS INDEX: 27.97 KG/M2 | OXYGEN SATURATION: 94 % | DIASTOLIC BLOOD PRESSURE: 73 MMHG | HEIGHT: 62 IN | TEMPERATURE: 98.2 F | SYSTOLIC BLOOD PRESSURE: 112 MMHG

## 2023-08-29 DIAGNOSIS — F32.0 MAJOR DEPRESSIVE DISORDER, SINGLE EPISODE, MILD (HCC): ICD-10-CM

## 2023-08-29 DIAGNOSIS — E78.5 HYPERLIPIDEMIA LDL GOAL <100: ICD-10-CM

## 2023-08-29 DIAGNOSIS — K51.818 OTHER ULCERATIVE COLITIS WITH OTHER COMPLICATION (HCC): ICD-10-CM

## 2023-08-29 DIAGNOSIS — Z00.00 MEDICARE ANNUAL WELLNESS VISIT, SUBSEQUENT: Primary | ICD-10-CM

## 2023-08-29 DIAGNOSIS — E03.4 ATROPHY OF THYROID (ACQUIRED): ICD-10-CM

## 2023-08-29 LAB
ALBUMIN SERPL-MCNC: 4.1 G/DL (ref 3.5–5)
ALBUMIN/GLOB SERPL: 1.4 (ref 1.1–2.2)
ALP SERPL-CCNC: 82 U/L (ref 45–117)
ALT SERPL-CCNC: 42 U/L (ref 12–78)
ANION GAP SERPL CALC-SCNC: 3 MMOL/L (ref 5–15)
AST SERPL-CCNC: 27 U/L (ref 15–37)
BILIRUB SERPL-MCNC: 0.9 MG/DL (ref 0.2–1)
BUN SERPL-MCNC: 18 MG/DL (ref 6–20)
BUN/CREAT SERPL: 25 (ref 12–20)
CALCIUM SERPL-MCNC: 9.2 MG/DL (ref 8.5–10.1)
CHLORIDE SERPL-SCNC: 108 MMOL/L (ref 97–108)
CHOLEST SERPL-MCNC: 177 MG/DL
CO2 SERPL-SCNC: 30 MMOL/L (ref 21–32)
CREAT SERPL-MCNC: 0.72 MG/DL (ref 0.55–1.02)
ERYTHROCYTE [DISTWIDTH] IN BLOOD BY AUTOMATED COUNT: 12.4 % (ref 11.5–14.5)
GLOBULIN SER CALC-MCNC: 2.9 G/DL (ref 2–4)
GLUCOSE SERPL-MCNC: 85 MG/DL (ref 65–100)
HCT VFR BLD AUTO: 42.2 % (ref 35–47)
HDLC SERPL-MCNC: 73 MG/DL
HDLC SERPL: 2.4 (ref 0–5)
HGB BLD-MCNC: 13.5 G/DL (ref 11.5–16)
LDLC SERPL CALC-MCNC: 89.4 MG/DL (ref 0–100)
MCH RBC QN AUTO: 30.3 PG (ref 26–34)
MCHC RBC AUTO-ENTMCNC: 32 G/DL (ref 30–36.5)
MCV RBC AUTO: 94.6 FL (ref 80–99)
NRBC # BLD: 0 K/UL (ref 0–0.01)
NRBC BLD-RTO: 0 PER 100 WBC
PLATELET # BLD AUTO: 208 K/UL (ref 150–400)
PMV BLD AUTO: 10.5 FL (ref 8.9–12.9)
POTASSIUM SERPL-SCNC: 4.5 MMOL/L (ref 3.5–5.1)
PROT SERPL-MCNC: 7 G/DL (ref 6.4–8.2)
RBC # BLD AUTO: 4.46 M/UL (ref 3.8–5.2)
SODIUM SERPL-SCNC: 141 MMOL/L (ref 136–145)
T4 FREE SERPL-MCNC: 1.2 NG/DL (ref 0.8–1.5)
TRIGL SERPL-MCNC: 73 MG/DL
TSH SERPL DL<=0.05 MIU/L-ACNC: 0.75 UIU/ML (ref 0.36–3.74)
VLDLC SERPL CALC-MCNC: 14.6 MG/DL
WBC # BLD AUTO: 5.2 K/UL (ref 3.6–11)

## 2023-08-29 PROCEDURE — G0439 PPPS, SUBSEQ VISIT: HCPCS | Performed by: INTERNAL MEDICINE

## 2023-08-29 PROCEDURE — 99213 OFFICE O/P EST LOW 20 MIN: CPT | Performed by: INTERNAL MEDICINE

## 2023-08-29 PROCEDURE — 1123F ACP DISCUSS/DSCN MKR DOCD: CPT | Performed by: INTERNAL MEDICINE

## 2023-09-10 RX ORDER — FLUOXETINE 10 MG/1
CAPSULE ORAL
Qty: 90 CAPSULE | Refills: 1 | Status: SHIPPED | OUTPATIENT
Start: 2023-09-10

## 2023-12-04 ENCOUNTER — OFFICE VISIT (OUTPATIENT)
Age: 68
End: 2023-12-04

## 2023-12-04 VITALS
BODY MASS INDEX: 28.53 KG/M2 | TEMPERATURE: 99.5 F | HEIGHT: 63 IN | WEIGHT: 161 LBS | DIASTOLIC BLOOD PRESSURE: 79 MMHG | HEART RATE: 81 BPM | SYSTOLIC BLOOD PRESSURE: 128 MMHG | OXYGEN SATURATION: 95 % | RESPIRATION RATE: 18 BRPM

## 2023-12-04 DIAGNOSIS — U07.1 COVID-19: Primary | ICD-10-CM

## 2023-12-04 LAB
Lab: ABNORMAL
PERFORMING INSTRUMENT: ABNORMAL
QC PASS/FAIL: ABNORMAL
QUICKVUE INFLUENZA TEST: NEGATIVE
SARS-COV-2, POC: DETECTED
VALID INTERNAL CONTROL, POC: YES

## 2023-12-04 RX ORDER — DEXTROMETHORPHAN HYDROBROMIDE AND PROMETHAZINE HYDROCHLORIDE 15; 6.25 MG/5ML; MG/5ML
5 SYRUP ORAL 4 TIMES DAILY PRN
Qty: 118 ML | Refills: 0 | Status: SHIPPED | OUTPATIENT
Start: 2023-12-04 | End: 2023-12-11

## 2023-12-04 RX ORDER — ALBUTEROL SULFATE 90 UG/1
2 AEROSOL, METERED RESPIRATORY (INHALATION) EVERY 6 HOURS PRN
Qty: 18 G | Refills: 0 | Status: SHIPPED | OUTPATIENT
Start: 2023-12-04

## 2023-12-04 RX ORDER — METHYLPREDNISOLONE 4 MG/1
4 TABLET ORAL SEE ADMIN INSTRUCTIONS
Qty: 1 KIT | Refills: 0 | Status: SHIPPED | OUTPATIENT
Start: 2023-12-04

## 2023-12-04 RX ORDER — BENZONATATE 100 MG/1
100 CAPSULE ORAL 3 TIMES DAILY PRN
Qty: 30 CAPSULE | Refills: 0 | Status: SHIPPED | OUTPATIENT
Start: 2023-12-04 | End: 2023-12-14

## 2023-12-04 ASSESSMENT — ENCOUNTER SYMPTOMS
COUGH: 1
EYES NEGATIVE: 1
SINUS PRESSURE: 1
ALLERGIC/IMMUNOLOGIC NEGATIVE: 1
GASTROINTESTINAL NEGATIVE: 1

## 2023-12-04 NOTE — PROGRESS NOTES
2023   Hayden Rubio (: 1955) is a 69 y.o. female, New patient, here for evaluation of the following chief complaint(s):  Congestion (C/O cough and head congestion, possible sinus infection, coughing was worse after going to bed )     ASSESSMENT/PLAN:  Below is the assessment and plan developed based on review of pertinent history, physical exam, labs, studies, and medications. 1. COVID-19  -     POCT COVID-19, Antigen  -     AMB POC RAPID INFLUENZA TEST  -     nirmatrelvir/ritonavir 300/100 (PAXLOVID) 20 x 150 MG & 10 x 100MG TBPK; Take 3 tablets (two 150 mg nirmatrelvir and one 100 mg ritonavir tablets) by mouth every 12 hours for 5 days. , Disp-30 tablet, R-0Normal  -     methylPREDNISolone (MEDROL, YOLANDE,) 4 MG tablet; Take 1 tablet by mouth See Admin Instructions Take by mouth., Disp-1 kit, R-0Normal  -     albuterol sulfate HFA (PROVENTIL;VENTOLIN;PROAIR) 108 (90 Base) MCG/ACT inhaler; Inhale 2 puffs into the lungs every 6 hours as needed for Wheezing, Disp-18 g, R-0Normal  -     promethazine-dextromethorphan (PROMETHAZINE-DM) 6.25-15 MG/5ML syrup; Take 5 mLs by mouth 4 times daily as needed for Cough, Disp-118 mL, R-0Normal  -     benzonatate (TESSALON) 100 MG capsule; Take 1 capsule by mouth 3 times daily as needed for Cough, Disp-30 capsule, R-0Normal         Handout given with care instructions  2. OTC for symptom management. Increase fluid intake, ensure adequate nutritional intake. 3. Follow up with PCP as needed. 4. Go to ED with development of any acute symptoms. Follow up:  Return if symptoms worsen or fail to improve. Follow up immediately for any new, worsening or changes or if symptoms are not improving over the next 5-7 days.      SUBJECTIVE/OBJECTIVE:  HPI     Diagnoses and all orders for this visit:  COVID-19  Congestion (C/O cough and head congestion, possible sinus infection, coughing was worse after going to bed )  Hayden Rubio is a 69 y.o. female who complains of

## 2024-01-08 RX ORDER — ROSUVASTATIN CALCIUM 10 MG/1
TABLET, COATED ORAL
Qty: 90 TABLET | Refills: 1 | Status: SHIPPED | OUTPATIENT
Start: 2024-01-08

## 2024-02-19 ENCOUNTER — HOSPITAL ENCOUNTER (OUTPATIENT)
Facility: HOSPITAL | Age: 69
Discharge: HOME | End: 2024-02-22
Attending: INTERNAL MEDICINE
Payer: COMMERCIAL

## 2024-02-19 VITALS — WEIGHT: 161 LBS | BODY MASS INDEX: 28.52 KG/M2

## 2024-02-19 DIAGNOSIS — Z12.31 VISIT FOR SCREENING MAMMOGRAM: ICD-10-CM

## 2024-02-19 PROCEDURE — 77067 SCR MAMMO BI INCL CAD: CPT

## 2024-02-25 NOTE — PROGRESS NOTES
Rosie Ruvalcaba (:  1955) is a 68 y.o. female,Established patient, here for evaluation of the following chief complaint(s):  Medication Check (Pt is fasting) and Hyperlipidemia         ASSESSMENT/PLAN:  1. Atrophy of thyroid (acquired)-cont with levothyroxine and tolerating medicine   -     TSH; Future  -     T4, Free; Future  2. Major depressive disorder, single episode, mild (HCC)-cont with prozac and tolerating medicine   3. Other ulcerative colitis with other complication (HCC)-cont with mesalamine and no rectal bleeding   4. Hyperlipidemia LDL goal <100-cont current dose of crestor and doing stable   -     Comprehensive Metabolic Panel; Future  -     Lipid Panel; Future      No follow-ups on file.     Levothyroxine   Prozac  Mesalamine   Crestor    Subjective   SUBJECTIVE/OBJECTIVE:  HPI  Silver sneakers and doing  circuit ; doing weight watchers and helping      Mood: she is taking prozac and tolerating medicine and her mood is good-      Hyperlipidemia:  Cardiovascular risk analysis - 66 y.o. female LDL goal is under 130.   ROS: taking medications as instructed, no medication side effects noted, no TIA's, no chest pain on exertion, no dyspnea on exertion, no swelling of ankles. Tolerating meds, no myalgias or other side effects noted. New concerns: tolerating medicine   Lab Results   Component Value Date    CHOL 177 2023     Lab Results   Component Value Date    TRIG 73 2023     Lab Results   Component Value Date    HDL 73 2023     Lab Results   Component Value Date    LDLCALC 89.4 2023     No results found for: \"VLDL\"  Lab Results   Component Value Date    CHOLHDLRATIO 2.4 2023                   SUBJECTIVE: Rosie Ruvalcaba is a 68 y.o. female here for follow up of hypothyroidism.    Lab Results   Component Value Date/Time    TSH 1.03 2022 09:40 AM     Thyroid ROS: denies fatigue, weight changes, heat/cold intolerance, bowel/skin changes or CVS symptoms.

## 2024-02-28 ENCOUNTER — OFFICE VISIT (OUTPATIENT)
Age: 69
End: 2024-02-28
Payer: COMMERCIAL

## 2024-02-28 VITALS
DIASTOLIC BLOOD PRESSURE: 74 MMHG | TEMPERATURE: 98.1 F | HEART RATE: 66 BPM | HEIGHT: 62 IN | RESPIRATION RATE: 15 BRPM | BODY MASS INDEX: 28.23 KG/M2 | OXYGEN SATURATION: 95 % | SYSTOLIC BLOOD PRESSURE: 112 MMHG | WEIGHT: 153.4 LBS

## 2024-02-28 DIAGNOSIS — E03.4 ATROPHY OF THYROID (ACQUIRED): ICD-10-CM

## 2024-02-28 DIAGNOSIS — E03.4 ATROPHY OF THYROID (ACQUIRED): Primary | ICD-10-CM

## 2024-02-28 DIAGNOSIS — E78.5 HYPERLIPIDEMIA LDL GOAL <100: ICD-10-CM

## 2024-02-28 DIAGNOSIS — K51.818 OTHER ULCERATIVE COLITIS WITH OTHER COMPLICATION (HCC): ICD-10-CM

## 2024-02-28 DIAGNOSIS — F32.0 MAJOR DEPRESSIVE DISORDER, SINGLE EPISODE, MILD (HCC): ICD-10-CM

## 2024-02-28 PROCEDURE — 1123F ACP DISCUSS/DSCN MKR DOCD: CPT | Performed by: INTERNAL MEDICINE

## 2024-02-28 PROCEDURE — 99214 OFFICE O/P EST MOD 30 MIN: CPT | Performed by: INTERNAL MEDICINE

## 2024-02-28 ASSESSMENT — PATIENT HEALTH QUESTIONNAIRE - PHQ9
2. FEELING DOWN, DEPRESSED OR HOPELESS: 0
SUM OF ALL RESPONSES TO PHQ QUESTIONS 1-9: 0
SUM OF ALL RESPONSES TO PHQ QUESTIONS 1-9: 0
1. LITTLE INTEREST OR PLEASURE IN DOING THINGS: 0
SUM OF ALL RESPONSES TO PHQ QUESTIONS 1-9: 0
SUM OF ALL RESPONSES TO PHQ9 QUESTIONS 1 & 2: 0
SUM OF ALL RESPONSES TO PHQ QUESTIONS 1-9: 0

## 2024-02-29 LAB
ALBUMIN SERPL-MCNC: 4.1 G/DL (ref 3.5–5)
ALBUMIN/GLOB SERPL: 1.5 (ref 1.1–2.2)
ALP SERPL-CCNC: 82 U/L (ref 45–117)
ALT SERPL-CCNC: 33 U/L (ref 12–78)
ANION GAP SERPL CALC-SCNC: 1 MMOL/L (ref 5–15)
AST SERPL-CCNC: 27 U/L (ref 15–37)
BILIRUB SERPL-MCNC: 0.9 MG/DL (ref 0.2–1)
BUN SERPL-MCNC: 22 MG/DL (ref 6–20)
BUN/CREAT SERPL: 32 (ref 12–20)
CALCIUM SERPL-MCNC: 9.5 MG/DL (ref 8.5–10.1)
CHLORIDE SERPL-SCNC: 109 MMOL/L (ref 97–108)
CHOLEST SERPL-MCNC: 164 MG/DL
CO2 SERPL-SCNC: 30 MMOL/L (ref 21–32)
CREAT SERPL-MCNC: 0.68 MG/DL (ref 0.55–1.02)
GLOBULIN SER CALC-MCNC: 2.8 G/DL (ref 2–4)
GLUCOSE SERPL-MCNC: 87 MG/DL (ref 65–100)
HDLC SERPL-MCNC: 71 MG/DL
HDLC SERPL: 2.3 (ref 0–5)
LDLC SERPL CALC-MCNC: 79.8 MG/DL (ref 0–100)
POTASSIUM SERPL-SCNC: 4.8 MMOL/L (ref 3.5–5.1)
PROT SERPL-MCNC: 6.9 G/DL (ref 6.4–8.2)
SODIUM SERPL-SCNC: 140 MMOL/L (ref 136–145)
T4 FREE SERPL-MCNC: 1.3 NG/DL (ref 0.8–1.5)
TRIGL SERPL-MCNC: 66 MG/DL
TSH SERPL DL<=0.05 MIU/L-ACNC: 1.35 UIU/ML (ref 0.36–3.74)
VLDLC SERPL CALC-MCNC: 13.2 MG/DL

## 2024-05-03 RX ORDER — LEVOTHYROXINE SODIUM 0.07 MG/1
TABLET ORAL
Qty: 90 TABLET | Refills: 3 | Status: SHIPPED | OUTPATIENT
Start: 2024-05-03

## 2024-05-03 RX ORDER — FLUOXETINE 10 MG/1
CAPSULE ORAL
Qty: 90 CAPSULE | Refills: 3 | Status: SHIPPED | OUTPATIENT
Start: 2024-05-03

## 2024-07-03 RX ORDER — ROSUVASTATIN CALCIUM 10 MG/1
TABLET, COATED ORAL
Qty: 90 TABLET | Refills: 3 | Status: SHIPPED | OUTPATIENT
Start: 2024-07-03

## 2024-08-31 SDOH — ECONOMIC STABILITY: INCOME INSECURITY: HOW HARD IS IT FOR YOU TO PAY FOR THE VERY BASICS LIKE FOOD, HOUSING, MEDICAL CARE, AND HEATING?: NOT HARD AT ALL

## 2024-08-31 SDOH — HEALTH STABILITY: PHYSICAL HEALTH: ON AVERAGE, HOW MANY MINUTES DO YOU ENGAGE IN EXERCISE AT THIS LEVEL?: 40 MIN

## 2024-08-31 SDOH — HEALTH STABILITY: PHYSICAL HEALTH: ON AVERAGE, HOW MANY DAYS PER WEEK DO YOU ENGAGE IN MODERATE TO STRENUOUS EXERCISE (LIKE A BRISK WALK)?: 3 DAYS

## 2024-08-31 SDOH — ECONOMIC STABILITY: FOOD INSECURITY: WITHIN THE PAST 12 MONTHS, THE FOOD YOU BOUGHT JUST DIDN'T LAST AND YOU DIDN'T HAVE MONEY TO GET MORE.: NEVER TRUE

## 2024-08-31 SDOH — ECONOMIC STABILITY: FOOD INSECURITY: WITHIN THE PAST 12 MONTHS, YOU WORRIED THAT YOUR FOOD WOULD RUN OUT BEFORE YOU GOT MONEY TO BUY MORE.: NEVER TRUE

## 2024-08-31 ASSESSMENT — LIFESTYLE VARIABLES
HOW MANY STANDARD DRINKS CONTAINING ALCOHOL DO YOU HAVE ON A TYPICAL DAY: 1
HOW OFTEN DO YOU HAVE A DRINK CONTAINING ALCOHOL: 2-3 TIMES A WEEK
HOW MANY STANDARD DRINKS CONTAINING ALCOHOL DO YOU HAVE ON A TYPICAL DAY: 1 OR 2
HOW OFTEN DO YOU HAVE A DRINK CONTAINING ALCOHOL: 4
HOW OFTEN DO YOU HAVE SIX OR MORE DRINKS ON ONE OCCASION: 1

## 2024-08-31 ASSESSMENT — PATIENT HEALTH QUESTIONNAIRE - PHQ9
SUM OF ALL RESPONSES TO PHQ QUESTIONS 1-9: 0
SUM OF ALL RESPONSES TO PHQ9 QUESTIONS 1 & 2: 0
SUM OF ALL RESPONSES TO PHQ QUESTIONS 1-9: 0
2. FEELING DOWN, DEPRESSED OR HOPELESS: NOT AT ALL
1. LITTLE INTEREST OR PLEASURE IN DOING THINGS: NOT AT ALL
SUM OF ALL RESPONSES TO PHQ QUESTIONS 1-9: 0
SUM OF ALL RESPONSES TO PHQ QUESTIONS 1-9: 0

## 2024-09-03 ENCOUNTER — OFFICE VISIT (OUTPATIENT)
Age: 69
End: 2024-09-03
Payer: COMMERCIAL

## 2024-09-03 VITALS
DIASTOLIC BLOOD PRESSURE: 71 MMHG | RESPIRATION RATE: 16 BRPM | SYSTOLIC BLOOD PRESSURE: 110 MMHG | HEART RATE: 67 BPM | HEIGHT: 62 IN | WEIGHT: 146.8 LBS | TEMPERATURE: 97.7 F | BODY MASS INDEX: 27.02 KG/M2 | OXYGEN SATURATION: 98 %

## 2024-09-03 DIAGNOSIS — E78.5 HYPERLIPIDEMIA LDL GOAL <100: ICD-10-CM

## 2024-09-03 DIAGNOSIS — E03.4 ATROPHY OF THYROID (ACQUIRED): ICD-10-CM

## 2024-09-03 DIAGNOSIS — K51.818 OTHER ULCERATIVE COLITIS WITH OTHER COMPLICATION (HCC): ICD-10-CM

## 2024-09-03 DIAGNOSIS — Z00.00 MEDICARE ANNUAL WELLNESS VISIT, SUBSEQUENT: Primary | ICD-10-CM

## 2024-09-03 DIAGNOSIS — F32.0 MAJOR DEPRESSIVE DISORDER, SINGLE EPISODE, MILD (HCC): ICD-10-CM

## 2024-09-03 LAB
ALBUMIN SERPL-MCNC: 4.3 G/DL (ref 3.5–5)
ALBUMIN/GLOB SERPL: 1.7 (ref 1.1–2.2)
ALP SERPL-CCNC: 90 U/L (ref 45–117)
ALT SERPL-CCNC: 28 U/L (ref 12–78)
ANION GAP SERPL CALC-SCNC: 2 MMOL/L (ref 5–15)
AST SERPL-CCNC: 29 U/L (ref 15–37)
BILIRUB SERPL-MCNC: 1 MG/DL (ref 0.2–1)
BUN SERPL-MCNC: 17 MG/DL (ref 6–20)
BUN/CREAT SERPL: 24 (ref 12–20)
CALCIUM SERPL-MCNC: 9.5 MG/DL (ref 8.5–10.1)
CHLORIDE SERPL-SCNC: 106 MMOL/L (ref 97–108)
CHOLEST SERPL-MCNC: 180 MG/DL
CO2 SERPL-SCNC: 30 MMOL/L (ref 21–32)
CREAT SERPL-MCNC: 0.72 MG/DL (ref 0.55–1.02)
ERYTHROCYTE [DISTWIDTH] IN BLOOD BY AUTOMATED COUNT: 12.5 % (ref 11.5–14.5)
GLOBULIN SER CALC-MCNC: 2.5 G/DL (ref 2–4)
GLUCOSE SERPL-MCNC: 83 MG/DL (ref 65–100)
HCT VFR BLD AUTO: 42.8 % (ref 35–47)
HDLC SERPL-MCNC: 80 MG/DL
HDLC SERPL: 2.3 (ref 0–5)
HGB BLD-MCNC: 13.8 G/DL (ref 11.5–16)
LDLC SERPL CALC-MCNC: 89.6 MG/DL (ref 0–100)
MCH RBC QN AUTO: 30.3 PG (ref 26–34)
MCHC RBC AUTO-ENTMCNC: 32.2 G/DL (ref 30–36.5)
MCV RBC AUTO: 93.9 FL (ref 80–99)
NRBC # BLD: 0 K/UL (ref 0–0.01)
NRBC BLD-RTO: 0 PER 100 WBC
PLATELET # BLD AUTO: 228 K/UL (ref 150–400)
PMV BLD AUTO: 10.5 FL (ref 8.9–12.9)
POTASSIUM SERPL-SCNC: 4.2 MMOL/L (ref 3.5–5.1)
PROT SERPL-MCNC: 6.8 G/DL (ref 6.4–8.2)
RBC # BLD AUTO: 4.56 M/UL (ref 3.8–5.2)
SODIUM SERPL-SCNC: 138 MMOL/L (ref 136–145)
T4 FREE SERPL-MCNC: 1.3 NG/DL (ref 0.8–1.5)
TRIGL SERPL-MCNC: 52 MG/DL
TSH SERPL DL<=0.05 MIU/L-ACNC: 0.94 UIU/ML (ref 0.36–3.74)
VLDLC SERPL CALC-MCNC: 10.4 MG/DL
WBC # BLD AUTO: 5.9 K/UL (ref 3.6–11)

## 2024-09-03 PROCEDURE — G0439 PPPS, SUBSEQ VISIT: HCPCS | Performed by: INTERNAL MEDICINE

## 2024-09-03 PROCEDURE — 1123F ACP DISCUSS/DSCN MKR DOCD: CPT | Performed by: INTERNAL MEDICINE

## 2024-09-03 PROCEDURE — 99214 OFFICE O/P EST MOD 30 MIN: CPT | Performed by: INTERNAL MEDICINE

## 2024-09-03 ASSESSMENT — PATIENT HEALTH QUESTIONNAIRE - PHQ9
8. MOVING OR SPEAKING SO SLOWLY THAT OTHER PEOPLE COULD HAVE NOTICED. OR THE OPPOSITE, BEING SO FIGETY OR RESTLESS THAT YOU HAVE BEEN MOVING AROUND A LOT MORE THAN USUAL: NOT AT ALL
5. POOR APPETITE OR OVEREATING: NOT AT ALL
SUM OF ALL RESPONSES TO PHQ QUESTIONS 1-9: 0
SUM OF ALL RESPONSES TO PHQ QUESTIONS 1-9: 0
6. FEELING BAD ABOUT YOURSELF - OR THAT YOU ARE A FAILURE OR HAVE LET YOURSELF OR YOUR FAMILY DOWN: NOT AT ALL
9. THOUGHTS THAT YOU WOULD BE BETTER OFF DEAD, OR OF HURTING YOURSELF: NOT AT ALL
SUM OF ALL RESPONSES TO PHQ QUESTIONS 1-9: 0
SUM OF ALL RESPONSES TO PHQ9 QUESTIONS 1 & 2: 0
1. LITTLE INTEREST OR PLEASURE IN DOING THINGS: NOT AT ALL
3. TROUBLE FALLING OR STAYING ASLEEP: NOT AT ALL
7. TROUBLE CONCENTRATING ON THINGS, SUCH AS READING THE NEWSPAPER OR WATCHING TELEVISION: NOT AT ALL
4. FEELING TIRED OR HAVING LITTLE ENERGY: NOT AT ALL
SUM OF ALL RESPONSES TO PHQ QUESTIONS 1-9: 0
2. FEELING DOWN, DEPRESSED OR HOPELESS: NOT AT ALL

## 2024-09-03 NOTE — PATIENT INSTRUCTIONS
years to screen for glaucoma; cataracts, macular degeneration, and other eye disorders.  A preventive dental visit is recommended every 6 months.  Try to get at least 150 minutes of exercise per week or 10,000 steps per day on a pedometer .  Order or download the FREE \"Exercise & Physical Activity: Your Everyday Guide\" from [employer]. Call 1-634.371.9192 or search [employer] online.  You need 6863-4319 mg of calcium and 7520-8989 IU of vitamin D per day. It is possible to meet your calcium requirement with diet alone, but a vitamin D supplement is usually necessary to meet this goal.  When exposed to the sun, use a sunscreen that protects against both UVA and UVB radiation with an SPF of 30 or greater. Reapply every 2 to 3 hours or after sweating, drying off with a towel, or swimming.  Always wear a seat belt when traveling in a car. Always wear a helmet when riding a bicycle or motorcycle.

## 2024-09-03 NOTE — PROGRESS NOTES
\"Have you been to the ER, urgent care clinic since your last visit?  Hospitalized since your last visit?\"    NO    “Have you seen or consulted any other health care providers outside of [facility] since your last visit?”    NO            Click Here for Release of Records Request   
bands. Her balance is satisfactory.    She is on a regimen of thyroid medication from Monday to Saturday, with a break on Sunday, and reports no adverse effects. She does not experience palpitations, chest pain, or shortness of breath. Her skin condition, bowel movements, and sleep patterns are all normal.    She is responding well to Prozac.    For her ulcerative colitis, she is gradually reducing her mesalamine dosage to one pill daily and has not experienced any breakthrough bleeding. She plans to maintain this dosage until her upcoming colonoscopy.    She is also taking rosuvastatin for high cholesterol and reports no associated muscle pain or chest discomfort.    She has an appointment scheduled for her COVID-19 vaccine on Friday and plans to receive her influenza vaccine in late September 2024 or early October 2024. She received her RSV vaccine last year. She is up-to-date with her pneumonia vaccines and regularly visits her dermatologist.    She does not require any medication refills at this time.     Lab Results   Component Value Date    TSH 1.35 02/28/2024     Lab Results   Component Value Date    CHOL 164 02/28/2024    TRIG 66 02/28/2024    HDL 71 02/28/2024    LDL 79.8 02/28/2024    VLDL 13.2 02/28/2024    CHOLHDLRATIO 2.3 02/28/2024       Review of Systems   All other systems reviewed and are negative.         Objective   Physical Exam  Vitals and nursing note reviewed.   Constitutional:       Appearance: Normal appearance.   HENT:      Head: Normocephalic and atraumatic.      Right Ear: Tympanic membrane and external ear normal.      Left Ear: Tympanic membrane and external ear normal.      Nose: Nose normal.      Mouth/Throat:      Mouth: Mucous membranes are moist.   Eyes:      Extraocular Movements: Extraocular movements intact.      Conjunctiva/sclera: Conjunctivae normal.   Cardiovascular:      Rate and Rhythm: Normal rate and regular rhythm.   Pulmonary:      Effort: Pulmonary effort is normal.

## 2025-02-28 SDOH — ECONOMIC STABILITY: INCOME INSECURITY: IN THE LAST 12 MONTHS, WAS THERE A TIME WHEN YOU WERE NOT ABLE TO PAY THE MORTGAGE OR RENT ON TIME?: NO

## 2025-02-28 SDOH — HEALTH STABILITY: PHYSICAL HEALTH: ON AVERAGE, HOW MANY MINUTES DO YOU ENGAGE IN EXERCISE AT THIS LEVEL?: 40 MIN

## 2025-02-28 SDOH — ECONOMIC STABILITY: TRANSPORTATION INSECURITY
IN THE PAST 12 MONTHS, HAS THE LACK OF TRANSPORTATION KEPT YOU FROM MEDICAL APPOINTMENTS OR FROM GETTING MEDICATIONS?: NO

## 2025-02-28 SDOH — ECONOMIC STABILITY: FOOD INSECURITY: WITHIN THE PAST 12 MONTHS, THE FOOD YOU BOUGHT JUST DIDN'T LAST AND YOU DIDN'T HAVE MONEY TO GET MORE.: NEVER TRUE

## 2025-02-28 SDOH — ECONOMIC STABILITY: FOOD INSECURITY: WITHIN THE PAST 12 MONTHS, YOU WORRIED THAT YOUR FOOD WOULD RUN OUT BEFORE YOU GOT MONEY TO BUY MORE.: NEVER TRUE

## 2025-02-28 SDOH — HEALTH STABILITY: PHYSICAL HEALTH: ON AVERAGE, HOW MANY DAYS PER WEEK DO YOU ENGAGE IN MODERATE TO STRENUOUS EXERCISE (LIKE A BRISK WALK)?: 2 DAYS

## 2025-02-28 ASSESSMENT — PATIENT HEALTH QUESTIONNAIRE - PHQ9
2. FEELING DOWN, DEPRESSED OR HOPELESS: NOT AT ALL
1. LITTLE INTEREST OR PLEASURE IN DOING THINGS: NOT AT ALL
SUM OF ALL RESPONSES TO PHQ QUESTIONS 1-9: 0

## 2025-02-28 ASSESSMENT — LIFESTYLE VARIABLES
HOW OFTEN DO YOU HAVE A DRINK CONTAINING ALCOHOL: 2-3 TIMES A WEEK
HOW OFTEN DO YOU HAVE SIX OR MORE DRINKS ON ONE OCCASION: 1
HOW MANY STANDARD DRINKS CONTAINING ALCOHOL DO YOU HAVE ON A TYPICAL DAY: 1
HOW MANY STANDARD DRINKS CONTAINING ALCOHOL DO YOU HAVE ON A TYPICAL DAY: 1 OR 2
HOW OFTEN DO YOU HAVE A DRINK CONTAINING ALCOHOL: 4

## 2025-02-28 NOTE — PROGRESS NOTES
Rosie Ruvalcaba (:  1955) is a 69 y.o. female,Established patient, here for evaluation of the following chief complaint(s):  Hypothyroidism (Patient is fasting) and Medicare AWV     Diagnosis Orders   1. Medicare annual wellness visit, subsequent        2. Atrophy of thyroid (acquired)        3. Acquired hypothyroidism        4. Major depressive disorder, single episode, mild        5. Other ulcerative colitis with other complication (HCC)        6. Hyperlipidemia LDL goal <100             Levothyroxine   Prozac  Mesalamine off   Crestor  Assessment & Plan  1. Medicare wellness visit.  Her cognitive function appears to be within normal limits. She is currently up-to-date with all necessary vaccinations. A comprehensive blood work panel will be ordered for further evaluation.    2. Depression.  She reports that her depression is well-managed with Prozac. No changes to her current medication regimen are necessary.    3. Ulcerative colitis.  She has been off mesalamine since  and reports no current symptoms. A colonoscopy is scheduled for May 6 to monitor for any signs of disease activity.    4. Hypercholesterolemia.  She is currently on rosuvastatin and reports occasional calf pain at night, but it is not significant enough to warrant changes in her medication. She is advised to ensure proper hydration and stretching.    5. Hypothyroidism.  She is on levothyroxine and reports no issues. Thyroid function tests will be ordered as her last labs were done in September.     Subjective   HPI  History of Present Illness  The patient is a 69-year-old female who presents for a Medicare wellness visit.    She has completed her Medicare wellness questions online. She reports no difficulty in reading or following through books and continues to participate in book clubs. She maintains an active lifestyle, including regular exercise three times a week and strength training at the gym. She has recently

## 2025-03-03 ENCOUNTER — OFFICE VISIT (OUTPATIENT)
Facility: CLINIC | Age: 70
End: 2025-03-03

## 2025-03-03 ENCOUNTER — HOSPITAL ENCOUNTER (OUTPATIENT)
Facility: HOSPITAL | Age: 70
Discharge: HOME | End: 2025-03-06
Payer: MEDICARE

## 2025-03-03 VITALS
OXYGEN SATURATION: 98 % | TEMPERATURE: 97.5 F | DIASTOLIC BLOOD PRESSURE: 69 MMHG | SYSTOLIC BLOOD PRESSURE: 130 MMHG | RESPIRATION RATE: 16 BRPM | HEART RATE: 78 BPM | BODY MASS INDEX: 26.01 KG/M2 | HEIGHT: 63 IN | WEIGHT: 146.8 LBS

## 2025-03-03 VITALS — HEIGHT: 63 IN | WEIGHT: 146 LBS | BODY MASS INDEX: 25.87 KG/M2

## 2025-03-03 DIAGNOSIS — Z12.31 VISIT FOR SCREENING MAMMOGRAM: ICD-10-CM

## 2025-03-03 DIAGNOSIS — E78.5 HYPERLIPIDEMIA LDL GOAL <100: ICD-10-CM

## 2025-03-03 DIAGNOSIS — F32.0 MAJOR DEPRESSIVE DISORDER, SINGLE EPISODE, MILD: ICD-10-CM

## 2025-03-03 DIAGNOSIS — Z00.00 MEDICARE ANNUAL WELLNESS VISIT, SUBSEQUENT: Primary | ICD-10-CM

## 2025-03-03 DIAGNOSIS — Z78.0 POST-MENOPAUSAL: ICD-10-CM

## 2025-03-03 DIAGNOSIS — E03.4 ATROPHY OF THYROID (ACQUIRED): ICD-10-CM

## 2025-03-03 DIAGNOSIS — K51.818 OTHER ULCERATIVE COLITIS WITH OTHER COMPLICATION (HCC): ICD-10-CM

## 2025-03-03 PROCEDURE — 77063 BREAST TOMOSYNTHESIS BI: CPT

## 2025-03-03 ASSESSMENT — PATIENT HEALTH QUESTIONNAIRE - PHQ9
9. THOUGHTS THAT YOU WOULD BE BETTER OFF DEAD, OR OF HURTING YOURSELF: NOT AT ALL
SUM OF ALL RESPONSES TO PHQ QUESTIONS 1-9: 0
SUM OF ALL RESPONSES TO PHQ QUESTIONS 1-9: 0
1. LITTLE INTEREST OR PLEASURE IN DOING THINGS: NOT AT ALL
10. IF YOU CHECKED OFF ANY PROBLEMS, HOW DIFFICULT HAVE THESE PROBLEMS MADE IT FOR YOU TO DO YOUR WORK, TAKE CARE OF THINGS AT HOME, OR GET ALONG WITH OTHER PEOPLE: NOT DIFFICULT AT ALL
7. TROUBLE CONCENTRATING ON THINGS, SUCH AS READING THE NEWSPAPER OR WATCHING TELEVISION: NOT AT ALL
SUM OF ALL RESPONSES TO PHQ QUESTIONS 1-9: 0
2. FEELING DOWN, DEPRESSED OR HOPELESS: NOT AT ALL
8. MOVING OR SPEAKING SO SLOWLY THAT OTHER PEOPLE COULD HAVE NOTICED. OR THE OPPOSITE, BEING SO FIGETY OR RESTLESS THAT YOU HAVE BEEN MOVING AROUND A LOT MORE THAN USUAL: NOT AT ALL
4. FEELING TIRED OR HAVING LITTLE ENERGY: NOT AT ALL
6. FEELING BAD ABOUT YOURSELF - OR THAT YOU ARE A FAILURE OR HAVE LET YOURSELF OR YOUR FAMILY DOWN: NOT AT ALL
5. POOR APPETITE OR OVEREATING: NOT AT ALL
SUM OF ALL RESPONSES TO PHQ QUESTIONS 1-9: 0
3. TROUBLE FALLING OR STAYING ASLEEP: NOT AT ALL

## 2025-03-03 NOTE — PATIENT INSTRUCTIONS
Learning About Being Active as an Older Adult  Why is being active important as you get older?     Being active is one of the best things you can do for your health. And it's never too late to start. Being active--or getting active, if you aren't already--has definite benefits. It can:  Give you more energy,  Keep your mind sharp.  Improve balance to reduce your risk of falls.  Help you manage chronic illness with fewer medicines.  No matter how old you are, how fit you are, or what health problems you have, there is a form of activity that will work for you. And the more physical activity you can do, the better your overall health will be.  What kinds of activity can help you stay healthy?  Being more active will make your daily activities easier. Physical activity includes planned exercise and things you do in daily life. There are four types of activity:  Aerobic.  Doing aerobic activity makes your heart and lungs strong.  Includes walking, dancing, and gardening.  Aim for at least 2½ hours spread throughout the week.  It improves your energy and can help you sleep better.  Muscle-strengthening.  This type of activity can help maintain muscle and strengthen bones.  Includes climbing stairs, using resistance bands, and lifting or carrying heavy loads.  Aim for at least twice a week.  It can help protect the knees and other joints.  Stretching.  Stretching gives you better range of motion in joints and muscles.  Includes upper arm stretches, calf stretches, and gentle yoga.  Aim for at least twice a week, preferably after your muscles are warmed up from other activities.  It can help you function better in daily life.  Balancing.  This helps you stay coordinated and have good posture.  Includes heel-to-toe walking, filiberto chi, and certain types of yoga.  Aim for at least 3 days a week.  It can reduce your risk of falling.  Even if you have a hard time meeting the recommendations, it's better to be more active

## 2025-03-07 DIAGNOSIS — E03.4 ATROPHY OF THYROID (ACQUIRED): ICD-10-CM

## 2025-03-07 DIAGNOSIS — E78.5 HYPERLIPIDEMIA LDL GOAL <100: ICD-10-CM

## 2025-03-07 LAB
ALBUMIN SERPL-MCNC: 3.9 G/DL (ref 3.5–5)
ALBUMIN/GLOB SERPL: 1.3 (ref 1.1–2.2)
ALP SERPL-CCNC: 83 U/L (ref 45–117)
ALT SERPL-CCNC: 35 U/L (ref 12–78)
ANION GAP SERPL CALC-SCNC: 3 MMOL/L (ref 2–12)
AST SERPL-CCNC: 34 U/L (ref 15–37)
BILIRUB SERPL-MCNC: 1 MG/DL (ref 0.2–1)
BUN SERPL-MCNC: 18 MG/DL (ref 6–20)
BUN/CREAT SERPL: 28 (ref 12–20)
CALCIUM SERPL-MCNC: 10 MG/DL (ref 8.5–10.1)
CHLORIDE SERPL-SCNC: 107 MMOL/L (ref 97–108)
CHOLEST SERPL-MCNC: 157 MG/DL
CO2 SERPL-SCNC: 30 MMOL/L (ref 21–32)
CREAT SERPL-MCNC: 0.64 MG/DL (ref 0.55–1.02)
GLOBULIN SER CALC-MCNC: 2.9 G/DL (ref 2–4)
GLUCOSE SERPL-MCNC: 92 MG/DL (ref 65–100)
HDLC SERPL-MCNC: 76 MG/DL
HDLC SERPL: 2.1 (ref 0–5)
LDLC SERPL CALC-MCNC: 71.2 MG/DL (ref 0–100)
POTASSIUM SERPL-SCNC: 4.8 MMOL/L (ref 3.5–5.1)
PROT SERPL-MCNC: 6.8 G/DL (ref 6.4–8.2)
SODIUM SERPL-SCNC: 140 MMOL/L (ref 136–145)
T4 FREE SERPL-MCNC: 1.3 NG/DL (ref 0.8–1.5)
TRIGL SERPL-MCNC: 49 MG/DL
TSH SERPL DL<=0.05 MIU/L-ACNC: 1.55 UIU/ML (ref 0.36–3.74)
VLDLC SERPL CALC-MCNC: 9.8 MG/DL

## 2025-03-25 NOTE — PRE-PROCEDURE INSTRUCTIONS
3/25/2025  3:30 p.m.  Spoke to patient and confirmed arrival time of 12:45 p.m. for appointment scheduled on 3/28/2025; instructions given.

## 2025-03-28 ENCOUNTER — HOSPITAL ENCOUNTER (OUTPATIENT)
Facility: HOSPITAL | Age: 70
Discharge: HOME | End: 2025-03-31
Attending: INTERNAL MEDICINE
Payer: MEDICARE

## 2025-03-28 DIAGNOSIS — Z78.0 POST-MENOPAUSAL: ICD-10-CM

## 2025-03-28 PROCEDURE — 77080 DXA BONE DENSITY AXIAL: CPT

## 2025-04-07 DIAGNOSIS — E03.4 ATROPHY OF THYROID (ACQUIRED): Primary | ICD-10-CM

## 2025-04-07 RX ORDER — LEVOTHYROXINE SODIUM 75 UG/1
TABLET ORAL
Qty: 90 TABLET | Refills: 3 | Status: SHIPPED | OUTPATIENT
Start: 2025-04-07

## 2025-04-07 NOTE — TELEPHONE ENCOUNTER
Last visit 3/3/25  Follow up 9/3/25    Lab Results   Component Value Date     03/07/2025    K 4.8 03/07/2025     03/07/2025    CO2 30 03/07/2025    BUN 18 03/07/2025    CREATININE 0.64 03/07/2025    GLUCOSE 92 03/07/2025    CALCIUM 10.0 03/07/2025    BILITOT 1.0 03/07/2025    ALKPHOS 83 03/07/2025    AST 34 03/07/2025    ALT 35 03/07/2025    LABGLOM >90 03/07/2025    GFRAA >60 05/09/2022    AGRATIO 1.6 11/09/2022    GLOB 2.9 03/07/2025     Lab Results   Component Value Date    WBC 5.9 09/03/2024    HGB 13.8 09/03/2024    HCT 42.8 09/03/2024    MCV 93.9 09/03/2024     09/03/2024

## 2025-04-08 PROBLEM — M81.0 AGE RELATED OSTEOPOROSIS: Status: ACTIVE | Noted: 2025-04-08

## 2025-04-08 RX ORDER — SODIUM CHLORIDE 9 MG/ML
5-250 INJECTION, SOLUTION INTRAVENOUS PRN
Status: CANCELLED | OUTPATIENT
Start: 2025-04-15

## 2025-04-15 ENCOUNTER — HOSPITAL ENCOUNTER (OUTPATIENT)
Facility: HOSPITAL | Age: 70
Setting detail: INFUSION SERIES
Discharge: HOME | End: 2025-04-15
Payer: MEDICARE

## 2025-04-15 VITALS
RESPIRATION RATE: 18 BRPM | HEART RATE: 67 BPM | WEIGHT: 148.1 LBS | SYSTOLIC BLOOD PRESSURE: 113 MMHG | DIASTOLIC BLOOD PRESSURE: 60 MMHG | OXYGEN SATURATION: 95 % | HEIGHT: 63 IN | TEMPERATURE: 98.1 F | BODY MASS INDEX: 26.24 KG/M2

## 2025-04-15 DIAGNOSIS — M81.0 AGE RELATED OSTEOPOROSIS, UNSPECIFIED PATHOLOGICAL FRACTURE PRESENCE: Primary | ICD-10-CM

## 2025-04-15 LAB
ANION GAP BLD CALC-SCNC: 10.4 MMOL/L (ref 10–20)
CA-I BLD-MCNC: 1.21 MMOL/L (ref 1.15–1.33)
CHLORIDE BLD-SCNC: 106 MMOL/L (ref 98–107)
CO2 BLD-SCNC: 24.6 MMOL/L (ref 21–32)
CREAT BLD-MCNC: 0.71 MG/DL (ref 0.6–1.3)
GLUCOSE BLD-MCNC: 83 MG/DL (ref 74–99)
POTASSIUM BLD-SCNC: 4.2 MMOL/L (ref 3.5–5.1)
SERVICE CMNT-IMP: NORMAL
SODIUM BLD-SCNC: 141 MMOL/L (ref 136–145)

## 2025-04-15 PROCEDURE — 80047 BASIC METABLC PNL IONIZED CA: CPT

## 2025-04-15 PROCEDURE — 6360000002 HC RX W HCPCS: Performed by: INTERNAL MEDICINE

## 2025-04-15 PROCEDURE — 96374 THER/PROPH/DIAG INJ IV PUSH: CPT

## 2025-04-15 RX ORDER — SODIUM CHLORIDE 9 MG/ML
5-250 INJECTION, SOLUTION INTRAVENOUS PRN
OUTPATIENT
Start: 2026-04-12

## 2025-04-15 RX ORDER — ZOLEDRONIC ACID 0.05 MG/ML
5 INJECTION, SOLUTION INTRAVENOUS ONCE
Status: COMPLETED | OUTPATIENT
Start: 2025-04-15 | End: 2025-04-15

## 2025-04-15 RX ORDER — ZOLEDRONIC ACID 0.05 MG/ML
5 INJECTION, SOLUTION INTRAVENOUS ONCE
OUTPATIENT
Start: 2026-04-12 | End: 2026-04-12

## 2025-04-15 RX ADMIN — ZOLEDRONIC ACID 5 MG: 0.05 INJECTION, SOLUTION INTRAVENOUS at 11:43

## 2025-04-15 NOTE — PROGRESS NOTES
[facility] Progress Note    Date: April 15, 2025        1130: Pt arrived ambulatory to [facility] for [facility] in stable condition.  Assessment completed. 24G PIV placed to left arm with positive blood return. Labs drawn and sent for processing.    Labs reviewed. Criteria for treatment was met. Patient denies any dental work or broken bone.     Patient Vitals for the past 12 hrs:   Temp Pulse Resp BP SpO2   04/15/25 1200 -- 67 -- 113/60 --   04/15/25 1115 98.1 °F (36.7 °C) 63 18 119/63 95 %     Recent Results (from the past 12 hours)   POC CHEM 8    Collection Time: 04/15/25 11:30 AM   Result Value Ref Range    POC Ionized Calcium 1.21 1.15 - 1.33 mmol/L    POC Sodium 141 136 - 145 mmol/L    POC Potassium 4.2 3.5 - 5.1 mmol/L    POC Chloride 106 98 - 107 mmol/L    POC TCO2 24.6 21 - 32 mmol/L    Anion Gap, POC 10.4 10 - 20 mmol/L    POC Glucose 83 74 - 99 mg/dL    POC Creatinine 0.71 0.6 - 1.3 mg/dL    eGFR, POC >90 >60 ml/min/1.73m2    UA Comment Comment Not Indicated.           Medications Administered         zoledronic acid (RECLAST) 5 mg/100 mL infusion Admin Date  04/15/2025 Action  New Bag Dose  5 mg Rate  400 mL/hr Route  IntraVENous Documented By  Rachid Padilla, RN               Ms. Ruvalcaba tolerated the infusion, and had no complaints.    PIV flushed and removed. 2x2 and coban placed    [facility]valc[facility]. Patient is aware there are currently no more appointments scheduled with [facility] at this time.         Future Appointments   Date Time Provider Department Center   9/3/2025 10:15 AM [facility]         RACHID PADILLA RN, RN  April 15, 2025

## 2025-05-05 RX ORDER — ROSUVASTATIN CALCIUM 10 MG/1
10 TABLET, COATED ORAL DAILY
Qty: 90 TABLET | Refills: 3 | Status: SHIPPED | OUTPATIENT
Start: 2025-05-05

## 2025-05-05 RX ORDER — FLUOXETINE 10 MG/1
10 CAPSULE ORAL DAILY
Qty: 90 CAPSULE | Refills: 3 | Status: SHIPPED | OUTPATIENT
Start: 2025-05-05

## 2025-09-03 ENCOUNTER — OFFICE VISIT (OUTPATIENT)
Facility: CLINIC | Age: 70
End: 2025-09-03
Payer: MEDICARE

## 2025-09-03 VITALS
WEIGHT: 150.6 LBS | BODY MASS INDEX: 26.68 KG/M2 | SYSTOLIC BLOOD PRESSURE: 118 MMHG | HEART RATE: 70 BPM | DIASTOLIC BLOOD PRESSURE: 78 MMHG

## 2025-09-03 DIAGNOSIS — E03.4 ATROPHY OF THYROID (ACQUIRED): Primary | ICD-10-CM

## 2025-09-03 DIAGNOSIS — K51.818 OTHER ULCERATIVE COLITIS WITH OTHER COMPLICATION (HCC): ICD-10-CM

## 2025-09-03 DIAGNOSIS — E78.5 HYPERLIPIDEMIA LDL GOAL <100: ICD-10-CM

## 2025-09-03 DIAGNOSIS — E03.9 ACQUIRED HYPOTHYROIDISM: ICD-10-CM

## 2025-09-03 DIAGNOSIS — F32.0 MAJOR DEPRESSIVE DISORDER, SINGLE EPISODE, MILD: ICD-10-CM

## 2025-09-03 PROCEDURE — G8428 CUR MEDS NOT DOCUMENT: HCPCS | Performed by: INTERNAL MEDICINE

## 2025-09-03 PROCEDURE — 1090F PRES/ABSN URINE INCON ASSESS: CPT | Performed by: INTERNAL MEDICINE

## 2025-09-03 PROCEDURE — 99214 OFFICE O/P EST MOD 30 MIN: CPT | Performed by: INTERNAL MEDICINE

## 2025-09-03 PROCEDURE — 1123F ACP DISCUSS/DSCN MKR DOCD: CPT | Performed by: INTERNAL MEDICINE

## 2025-09-03 PROCEDURE — 3017F COLORECTAL CA SCREEN DOC REV: CPT | Performed by: INTERNAL MEDICINE

## 2025-09-03 PROCEDURE — G8419 CALC BMI OUT NRM PARAM NOF/U: HCPCS | Performed by: INTERNAL MEDICINE

## 2025-09-03 PROCEDURE — G8399 PT W/DXA RESULTS DOCUMENT: HCPCS | Performed by: INTERNAL MEDICINE

## 2025-09-03 PROCEDURE — 1036F TOBACCO NON-USER: CPT | Performed by: INTERNAL MEDICINE

## (undated) DEVICE — PACK,BASIC,SIRUS,V: Brand: MEDLINE

## (undated) DEVICE — PREP SKN CHLRAPRP APL 26ML STR --

## (undated) DEVICE — SUTURE ETHLN SZ 4-0 L18IN NONABSORBABLE BLK L19MM PS-2 3/8 1667H

## (undated) DEVICE — ZIMMER® STERILE DISPOSABLE TOURNIQUET CUFF WITH PROTECTIVE SLEEVE AND PLC, DUAL PORT, SINGLE BLADDER, 18 IN. (46 CM)

## (undated) DEVICE — STERILE POLYISOPRENE POWDER-FREE SURGICAL GLOVES: Brand: PROTEXIS

## (undated) DEVICE — REM POLYHESIVE ADULT PATIENT RETURN ELECTRODE: Brand: VALLEYLAB

## (undated) DEVICE — DRSG GZ OIL EMUL CURAD 3X3 --

## (undated) DEVICE — ROCKER SWITCH PENCIL BLADE ELECTRODE, HOLSTER: Brand: EDGE

## (undated) DEVICE — CANISTER, RIGID, 3000CC: Brand: MEDLINE INDUSTRIES, INC.

## (undated) DEVICE — BNDG ELAS HK LOOP 4X5YD NS -- MATRIX

## (undated) DEVICE — NEEDLE HYPO 25GA L1.5IN BVL ORIENTED ECLIPSE

## (undated) DEVICE — INTENDED FOR TISSUE SEPARATION, AND OTHER PROCEDURES THAT REQUIRE A SHARP SURGICAL BLADE TO PUNCTURE OR CUT.: Brand: BARD-PARKER ® CARBON RIB-BACK BLADES

## (undated) DEVICE — STRAP,POSITIONING,KNEE/BODY,FOAM,4X60": Brand: MEDLINE

## (undated) DEVICE — DRAPE,U/ SHT,SPLIT,PLAS,STERIL: Brand: MEDLINE

## (undated) DEVICE — DRAPE C ARM W54XL84IN MINI FOR OEC 6800

## (undated) DEVICE — SUTURE VCRL SZ 4-0 L27IN ABSRB UD L26MM SH 1/2 CIR J415H

## (undated) DEVICE — BANDAGE,ELASTIC,ESMARK,STERILE,4"X9',LF: Brand: MEDLINE

## (undated) DEVICE — STERILE POLYISOPRENE POWDER-FREE SURGICAL GLOVES WITH EMOLLIENT COATING: Brand: PROTEXIS

## (undated) DEVICE — GOWN,SIRUS,NONRNF,SETINSLV,2XL,18/CS: Brand: MEDLINE

## (undated) DEVICE — SUTURE VCRL SZ 3-0 L27IN ABSRB UD L26MM SH 1/2 CIR J416H

## (undated) DEVICE — BLADE SAW OSC COARSE 25X9MM --

## (undated) DEVICE — DRAPE SHT 3 QTR PROXIMA 53X77 --

## (undated) DEVICE — SPONGE GZ W4XL4IN COT 12 PLY TYP VII WVN C FLD DSGN

## (undated) DEVICE — BANDAGE COBAN 4 IN COMPR W4INXL5YD FOAM COHESIVE QUIK STK SELF ADH SFT

## (undated) DEVICE — TUBING SUCT 10FR MAL ALUM SHFT FN CAP VENT UNIV CONN W/ OBT

## (undated) DEVICE — SURGICAL PROCEDURE PACK BASIN MAJ SET CUST NO CAUT

## (undated) DEVICE — PREP KIT PEEL PTCH POVIDONE IOD

## (undated) DEVICE — SOL IRRIGATION INJ NACL 0.9% 500ML BTL

## (undated) DEVICE — SYR 10ML LUER LOK 1/5ML GRAD --

## (undated) DEVICE — BLADE OSC SAW 40X11MM -- STRKER

## (undated) DEVICE — Device

## (undated) DEVICE — DRAPE,EXTREMITY,89X128,STERILE: Brand: MEDLINE

## (undated) DEVICE — COVER LT HNDL PLAS RIG 1 PER PK

## (undated) DEVICE — INFECTION CONTROL KIT SYS